# Patient Record
Sex: FEMALE | Race: WHITE | Employment: OTHER | URBAN - METROPOLITAN AREA
[De-identification: names, ages, dates, MRNs, and addresses within clinical notes are randomized per-mention and may not be internally consistent; named-entity substitution may affect disease eponyms.]

---

## 2017-02-13 RX ORDER — GABAPENTIN 600 MG/1
TABLET ORAL
Qty: 360 TABLET | Refills: 2 | Status: SHIPPED | OUTPATIENT
Start: 2017-02-13 | End: 2017-03-27 | Stop reason: SDUPTHER

## 2017-02-15 RX ORDER — CITALOPRAM 40 MG/1
TABLET ORAL
Qty: 90 TABLET | Refills: 0 | Status: SHIPPED | OUTPATIENT
Start: 2017-02-15 | End: 2019-02-20 | Stop reason: SDUPTHER

## 2017-04-05 ENCOUNTER — OFFICE VISIT (OUTPATIENT)
Dept: FAMILY MEDICINE CLINIC | Age: 67
End: 2017-04-05

## 2017-04-05 VITALS
TEMPERATURE: 98.4 F | WEIGHT: 178.4 LBS | BODY MASS INDEX: 28.67 KG/M2 | HEIGHT: 66 IN | DIASTOLIC BLOOD PRESSURE: 54 MMHG | SYSTOLIC BLOOD PRESSURE: 92 MMHG

## 2017-04-05 DIAGNOSIS — I47.1 SVT (SUPRAVENTRICULAR TACHYCARDIA) (HCC): ICD-10-CM

## 2017-04-05 DIAGNOSIS — E78.2 MIXED HYPERLIPIDEMIA: ICD-10-CM

## 2017-04-05 DIAGNOSIS — G89.29 CHRONIC BILATERAL LOW BACK PAIN WITH RIGHT-SIDED SCIATICA: ICD-10-CM

## 2017-04-05 DIAGNOSIS — G44.52 NEW DAILY PERSISTENT HEADACHE: ICD-10-CM

## 2017-04-05 DIAGNOSIS — R55 SYNCOPE AND COLLAPSE: Primary | ICD-10-CM

## 2017-04-05 DIAGNOSIS — F41.1 ANXIETY STATE: ICD-10-CM

## 2017-04-05 DIAGNOSIS — M54.41 CHRONIC BILATERAL LOW BACK PAIN WITH RIGHT-SIDED SCIATICA: ICD-10-CM

## 2017-04-05 DIAGNOSIS — R55 SYNCOPE AND COLLAPSE: ICD-10-CM

## 2017-04-05 PROBLEM — R51.9 HEADACHE, CHRONIC DAILY: Status: ACTIVE | Noted: 2017-04-05

## 2017-04-05 PROCEDURE — 93000 ELECTROCARDIOGRAM COMPLETE: CPT | Performed by: INTERNAL MEDICINE

## 2017-04-05 PROCEDURE — 99215 OFFICE O/P EST HI 40 MIN: CPT | Performed by: INTERNAL MEDICINE

## 2017-04-05 PROCEDURE — 93880 EXTRACRANIAL BILAT STUDY: CPT | Performed by: INTERNAL MEDICINE

## 2017-04-05 ASSESSMENT — ENCOUNTER SYMPTOMS
SINUS PRESSURE: 0
RHINORRHEA: 0
NAUSEA: 0
VOMITING: 0
ABDOMINAL PAIN: 0
CONSTIPATION: 0
COUGH: 0
DIARRHEA: 0
APNEA: 0

## 2017-04-06 LAB
ALT SERPL-CCNC: 14 U/L (ref 10–40)
ANION GAP SERPL CALCULATED.3IONS-SCNC: 14 MMOL/L (ref 3–16)
AST SERPL-CCNC: 12 U/L (ref 15–37)
BANDED NEUTROPHILS RELATIVE PERCENT: 8 % (ref 0–7)
BASOPHILS ABSOLUTE: 0 K/UL (ref 0–0.2)
BASOPHILS RELATIVE PERCENT: 0 %
BUN BLDV-MCNC: 26 MG/DL (ref 7–20)
CALCIUM SERPL-MCNC: 8.7 MG/DL (ref 8.3–10.6)
CHLORIDE BLD-SCNC: 99 MMOL/L (ref 99–110)
CHOLESTEROL, TOTAL: 182 MG/DL (ref 0–199)
CO2: 23 MMOL/L (ref 21–32)
CREAT SERPL-MCNC: 1.3 MG/DL (ref 0.6–1.2)
EOSINOPHILS ABSOLUTE: 0.2 K/UL (ref 0–0.6)
EOSINOPHILS RELATIVE PERCENT: 2 %
GFR AFRICAN AMERICAN: 50
GFR NON-AFRICAN AMERICAN: 41
GLUCOSE BLD-MCNC: 76 MG/DL (ref 70–99)
HCT VFR BLD CALC: 42.5 % (ref 36–48)
HDLC SERPL-MCNC: 51 MG/DL (ref 40–60)
HEMOGLOBIN: 13.8 G/DL (ref 12–16)
LDL CHOLESTEROL CALCULATED: 95 MG/DL
LYMPHOCYTES ABSOLUTE: 2.9 K/UL (ref 1–5.1)
LYMPHOCYTES RELATIVE PERCENT: 27 %
MCH RBC QN AUTO: 30.1 PG (ref 26–34)
MCHC RBC AUTO-ENTMCNC: 32.6 G/DL (ref 31–36)
MCV RBC AUTO: 92.3 FL (ref 80–100)
MONOCYTES ABSOLUTE: 1.2 K/UL (ref 0–1.3)
MONOCYTES RELATIVE PERCENT: 11 %
NEUTROPHILS ABSOLUTE: 6.4 K/UL (ref 1.7–7.7)
NEUTROPHILS RELATIVE PERCENT: 52 %
PDW BLD-RTO: 14.4 % (ref 12.4–15.4)
PLATELET # BLD: 264 K/UL (ref 135–450)
PLATELET SLIDE REVIEW: ADEQUATE
PMV BLD AUTO: 9.6 FL (ref 5–10.5)
POTASSIUM SERPL-SCNC: 4.3 MMOL/L (ref 3.5–5.1)
RBC # BLD: 4.6 M/UL (ref 4–5.2)
SODIUM BLD-SCNC: 136 MMOL/L (ref 136–145)
TRIGL SERPL-MCNC: 178 MG/DL (ref 0–150)
VLDLC SERPL CALC-MCNC: 36 MG/DL
WBC # BLD: 10.7 K/UL (ref 4–11)

## 2017-04-10 ENCOUNTER — TELEPHONE (OUTPATIENT)
Dept: PAIN MANAGEMENT | Age: 67
End: 2017-04-10

## 2017-04-12 ENCOUNTER — HOSPITAL ENCOUNTER (OUTPATIENT)
Dept: VASCULAR LAB | Age: 67
Discharge: HOME | End: 2017-04-12
Attending: INTERNAL MEDICINE | Admitting: INTERNAL MEDICINE

## 2017-04-12 DIAGNOSIS — G44.52 NEW DAILY PERSISTENT HEADACHE (NDPH): ICD-10-CM

## 2017-04-12 DIAGNOSIS — R55 SYNCOPE AND COLLAPSE: ICD-10-CM

## 2017-04-12 DIAGNOSIS — G44.52 NEW DAILY PERSISTENT HEADACHE: ICD-10-CM

## 2017-04-18 ENCOUNTER — TELEPHONE (OUTPATIENT)
Dept: FAMILY MEDICINE CLINIC | Age: 67
End: 2017-04-18

## 2017-04-18 DIAGNOSIS — R55 SYNCOPE AND COLLAPSE: Primary | ICD-10-CM

## 2017-04-28 PROCEDURE — 93228 REMOTE 30 DAY ECG REV/REPORT: CPT | Performed by: INTERNAL MEDICINE

## 2017-05-05 DIAGNOSIS — I47.1 PAROXYSMAL SUPRAVENTRICULAR TACHYCARDIA (HCC): Primary | ICD-10-CM

## 2017-05-10 ENCOUNTER — TELEPHONE (OUTPATIENT)
Dept: FAMILY MEDICINE CLINIC | Age: 67
End: 2017-05-10

## 2017-05-23 ENCOUNTER — OFFICE VISIT (OUTPATIENT)
Dept: CARDIOLOGY CLINIC | Age: 67
End: 2017-05-23

## 2017-05-23 VITALS
HEIGHT: 66 IN | WEIGHT: 176 LBS | HEART RATE: 106 BPM | BODY MASS INDEX: 28.28 KG/M2 | OXYGEN SATURATION: 98 % | SYSTOLIC BLOOD PRESSURE: 130 MMHG | DIASTOLIC BLOOD PRESSURE: 70 MMHG

## 2017-05-23 DIAGNOSIS — I47.1 SVT (SUPRAVENTRICULAR TACHYCARDIA) (HCC): ICD-10-CM

## 2017-05-23 DIAGNOSIS — R55 SYNCOPE AND COLLAPSE: Primary | ICD-10-CM

## 2017-05-23 PROCEDURE — 99205 OFFICE O/P NEW HI 60 MIN: CPT | Performed by: INTERNAL MEDICINE

## 2017-05-23 PROCEDURE — 93000 ELECTROCARDIOGRAM COMPLETE: CPT | Performed by: INTERNAL MEDICINE

## 2017-05-31 ENCOUNTER — TELEPHONE (OUTPATIENT)
Dept: CARDIOLOGY CLINIC | Age: 67
End: 2017-05-31

## 2017-06-20 ENCOUNTER — TELEPHONE (OUTPATIENT)
Dept: FAMILY MEDICINE CLINIC | Age: 67
End: 2017-06-20

## 2017-08-16 ENCOUNTER — TELEPHONE (OUTPATIENT)
Dept: PAIN MANAGEMENT | Age: 67
End: 2017-08-16

## 2017-12-13 ENCOUNTER — HOSPITAL ENCOUNTER (OUTPATIENT)
Dept: ULTRASOUND IMAGING | Age: 67
Discharge: HOME | End: 2017-12-13
Attending: INTERNAL MEDICINE | Admitting: INTERNAL MEDICINE

## 2017-12-13 DIAGNOSIS — R55 SYNCOPE AND COLLAPSE: ICD-10-CM

## 2017-12-13 DIAGNOSIS — N17.9 ACUTE RENAL FAILURE, UNSPECIFIED ACUTE RENAL FAILURE TYPE (HCC): ICD-10-CM

## 2018-07-24 ENCOUNTER — HOSPITAL ENCOUNTER (OUTPATIENT)
Dept: NON INVASIVE DIAGNOSTICS | Age: 68
Discharge: HOME | End: 2018-07-24
Attending: FAMILY MEDICINE | Admitting: FAMILY MEDICINE

## 2018-07-24 DIAGNOSIS — R20.2 PARESTHESIA: ICD-10-CM

## 2018-08-24 ENCOUNTER — HOSPITAL ENCOUNTER (OUTPATIENT)
Dept: NON INVASIVE DIAGNOSTICS | Age: 68
Discharge: HOME | End: 2018-08-24
Attending: NURSE PRACTITIONER | Admitting: NURSE PRACTITIONER

## 2018-08-24 DIAGNOSIS — M79.671 RIGHT FOOT PAIN: ICD-10-CM

## 2018-08-28 ENCOUNTER — HOSPITAL ENCOUNTER (OUTPATIENT)
Dept: NEUROLOGY | Age: 68
Discharge: HOME | End: 2018-08-28
Attending: FAMILY MEDICINE | Admitting: FAMILY MEDICINE

## 2018-08-28 DIAGNOSIS — R20.2 PARESTHESIA OF SKIN: ICD-10-CM

## 2018-08-28 NOTE — PROCEDURES
Electrodiagnostic Report  [facility] [facility], Sandra Lemos DO, Vernia Records Avila Leon MD  Phone: [phone]  Fax: [phone]    08/28/18    Sunni Love  80 y.o.  1950  [id number]  Referring Provider: Shay Lockwood MD    Clinical Problem:  81 y/o with history of left arm paresthesia from elbow distally, onset months ago.  PMH: + CKD, + neck and back pain, no neck or arm surgery PE: reflexes trace, + thumb opposition weakness, + hand intrinsic weakness LUE    EMG SUMMARY TABLE LEFT UPPER     Spontaneous     MUAP   Recruitment    Insertional Activity Fibrillation Potential Positive Sharp Waves   Fasiculation High Frequency Potentials   Amp Duration PPP Pattern   Deltoid C5.6 Ax normal none none none none normal normal normal normal   Biceps C5,6 musc cut normal none none none none normal normal normal normal   Triceps C6,7,8 Radial normal none none none none normal normal normal normal   Pronator Teres C6,7 Median normal none none none none normal normal normal normal   Brachio Rad C5,6 Radial normal none none none none normal normal normal normal   Ext Ind Prop C7,8 Radial normal none none none none normal normal normal normal   Oppones Poll C8-T1 Median normal none none none none normal normal normal normal   1st Dorsal Int C8-T1 Ulnar normal none none none none normal normal normal normal   Cerv Paraspinals C2-T1 PPR       normal none none none none normal normal normal normal     Nerve Conduction Studies     Nerve Sensory Distal Latency (msec) Sensory Distal Latency (msec) Amp uv Amp uv Motor Distal Latency (msec) Motor Distal Latency (msec) Amp kv Amp kv Motor NCV (m/sec) Motor NCV (m/sec)    Right Left Right Left Right Left Right Left Right Left   Median (n<3.6) 4.1 (n<3.6)  22  (n<4.3) 3.6 (n<4.3)  7.7  (n>50) 52 (n>50)   Ulnar (n<3.7) 3.4 (n<3.7)  15  (n<4.2) 3.4 (n<4.2)  6.2   5.6 b.e(n>50)   a.e(>45) 57 b.e(n>50)  40 a.e(>45)   Radial (n<3.5) (n<3.5)             Summary of EMG and Nerve Conduction Findings: The above EMG needle exam was within normal limits. Nerve conduction studies demonstrate mild prolongation of lef  median sensory distal latency. There is slowing of left ulnar motor conduction velocity across elbow Remainder within normal limits. Overall Impression: Left ulnar neuropathy at elbow with underlying left carpal tunnel syndrome, both mild severity. No evidence of an acute radiculopathy or other entrapment neuropathy. Thank you. Electronically signed by:  Prince Bozena DO,8/28/2018,11:31 AM

## 2019-02-20 ENCOUNTER — OFFICE VISIT (OUTPATIENT)
Dept: INTERNAL MEDICINE CLINIC | Age: 69
End: 2019-02-20
Payer: MEDICARE

## 2019-02-20 VITALS
WEIGHT: 183 LBS | HEIGHT: 64 IN | BODY MASS INDEX: 31.24 KG/M2 | DIASTOLIC BLOOD PRESSURE: 62 MMHG | TEMPERATURE: 98.1 F | RESPIRATION RATE: 16 BRPM | HEART RATE: 70 BPM | SYSTOLIC BLOOD PRESSURE: 106 MMHG | OXYGEN SATURATION: 98 %

## 2019-02-20 DIAGNOSIS — N18.30 CHRONIC RENAL FAILURE, STAGE 3 (MODERATE) (HCC): ICD-10-CM

## 2019-02-20 DIAGNOSIS — N18.30 CHRONIC KIDNEY DISEASE, STAGE III (MODERATE) (HCC): ICD-10-CM

## 2019-02-20 DIAGNOSIS — Z13.31 POSITIVE DEPRESSION SCREENING: ICD-10-CM

## 2019-02-20 DIAGNOSIS — G89.4 CHRONIC PAIN SYNDROME: ICD-10-CM

## 2019-02-20 DIAGNOSIS — M51.9 LUMBAR DISC DISEASE: ICD-10-CM

## 2019-02-20 DIAGNOSIS — Z12.39 BREAST CANCER SCREENING: ICD-10-CM

## 2019-02-20 DIAGNOSIS — J31.0 CHRONIC RHINITIS: ICD-10-CM

## 2019-02-20 DIAGNOSIS — E78.00 HYPERCHOLESTEREMIA: ICD-10-CM

## 2019-02-20 DIAGNOSIS — R53.82 CHRONIC FATIGUE: ICD-10-CM

## 2019-02-20 DIAGNOSIS — J06.9 UPPER RESPIRATORY TRACT INFECTION, UNSPECIFIED TYPE: ICD-10-CM

## 2019-02-20 DIAGNOSIS — R10.30 LOWER ABDOMINAL PAIN: Primary | ICD-10-CM

## 2019-02-20 DIAGNOSIS — Z91.81 AT HIGH RISK FOR FALLS: ICD-10-CM

## 2019-02-20 DIAGNOSIS — F33.0 MILD EPISODE OF RECURRENT MAJOR DEPRESSIVE DISORDER (HCC): ICD-10-CM

## 2019-02-20 LAB
A/G RATIO: 1.9 (ref 1.1–2.2)
ALBUMIN SERPL-MCNC: 4 G/DL (ref 3.4–5)
ALP BLD-CCNC: 107 U/L (ref 40–129)
ALT SERPL-CCNC: 12 U/L (ref 10–40)
ANION GAP SERPL CALCULATED.3IONS-SCNC: 13 MMOL/L (ref 3–16)
AST SERPL-CCNC: 15 U/L (ref 15–37)
BASOPHILS ABSOLUTE: 0 K/UL (ref 0–0.2)
BASOPHILS RELATIVE PERCENT: 0.7 %
BILIRUB SERPL-MCNC: <0.2 MG/DL (ref 0–1)
BILIRUBIN, POC: NORMAL
BLOOD URINE, POC: NORMAL
BUN BLDV-MCNC: 21 MG/DL (ref 7–20)
CALCIUM SERPL-MCNC: 9.2 MG/DL (ref 8.3–10.6)
CHLORIDE BLD-SCNC: 101 MMOL/L (ref 99–110)
CHOLESTEROL, TOTAL: 186 MG/DL (ref 0–199)
CLARITY, POC: NORMAL
CO2: 27 MMOL/L (ref 21–32)
COLOR, POC: NORMAL
CREAT SERPL-MCNC: 1.3 MG/DL (ref 0.6–1.2)
EOSINOPHILS ABSOLUTE: 0.3 K/UL (ref 0–0.6)
EOSINOPHILS RELATIVE PERCENT: 4.2 %
GFR AFRICAN AMERICAN: 49
GFR NON-AFRICAN AMERICAN: 41
GLOBULIN: 2.1 G/DL
GLUCOSE BLD-MCNC: 88 MG/DL (ref 70–99)
GLUCOSE URINE, POC: NORMAL
HCT VFR BLD CALC: 43.1 % (ref 36–48)
HDLC SERPL-MCNC: 51 MG/DL (ref 40–60)
HEMOGLOBIN: 13.8 G/DL (ref 12–16)
KETONES, POC: NORMAL
LDL CHOLESTEROL CALCULATED: 105 MG/DL
LEUKOCYTE EST, POC: NORMAL
LYMPHOCYTES ABSOLUTE: 2 K/UL (ref 1–5.1)
LYMPHOCYTES RELATIVE PERCENT: 31.3 %
MCH RBC QN AUTO: 31.3 PG (ref 26–34)
MCHC RBC AUTO-ENTMCNC: 31.9 G/DL (ref 31–36)
MCV RBC AUTO: 98.1 FL (ref 80–100)
MONOCYTES ABSOLUTE: 0.6 K/UL (ref 0–1.3)
MONOCYTES RELATIVE PERCENT: 8.6 %
NEUTROPHILS ABSOLUTE: 3.6 K/UL (ref 1.7–7.7)
NEUTROPHILS RELATIVE PERCENT: 55.2 %
NITRITE, POC: NORMAL
PDW BLD-RTO: 14.6 % (ref 12.4–15.4)
PH, POC: 6
PLATELET # BLD: 236 K/UL (ref 135–450)
PMV BLD AUTO: 8.9 FL (ref 5–10.5)
POTASSIUM SERPL-SCNC: 5.6 MMOL/L (ref 3.5–5.1)
PROTEIN, POC: NORMAL
RBC # BLD: 4.39 M/UL (ref 4–5.2)
SODIUM BLD-SCNC: 141 MMOL/L (ref 136–145)
SPECIFIC GRAVITY, POC: 1.02
TOTAL PROTEIN: 6.1 G/DL (ref 6.4–8.2)
TRIGL SERPL-MCNC: 150 MG/DL (ref 0–150)
TSH SERPL DL<=0.05 MIU/L-ACNC: 2.84 UIU/ML (ref 0.27–4.2)
UROBILINOGEN, POC: NORMAL
VLDLC SERPL CALC-MCNC: 30 MG/DL
WBC # BLD: 6.5 K/UL (ref 4–11)

## 2019-02-20 PROCEDURE — G8431 POS CLIN DEPRES SCRN F/U DOC: HCPCS | Performed by: INTERNAL MEDICINE

## 2019-02-20 PROCEDURE — 3017F COLORECTAL CA SCREEN DOC REV: CPT | Performed by: INTERNAL MEDICINE

## 2019-02-20 PROCEDURE — G8417 CALC BMI ABV UP PARAM F/U: HCPCS | Performed by: INTERNAL MEDICINE

## 2019-02-20 PROCEDURE — G8484 FLU IMMUNIZE NO ADMIN: HCPCS | Performed by: INTERNAL MEDICINE

## 2019-02-20 PROCEDURE — 36415 COLL VENOUS BLD VENIPUNCTURE: CPT | Performed by: INTERNAL MEDICINE

## 2019-02-20 PROCEDURE — G8427 DOCREV CUR MEDS BY ELIG CLIN: HCPCS | Performed by: INTERNAL MEDICINE

## 2019-02-20 PROCEDURE — G8400 PT W/DXA NO RESULTS DOC: HCPCS | Performed by: INTERNAL MEDICINE

## 2019-02-20 PROCEDURE — 4040F PNEUMOC VAC/ADMIN/RCVD: CPT | Performed by: INTERNAL MEDICINE

## 2019-02-20 PROCEDURE — 1101F PT FALLS ASSESS-DOCD LE1/YR: CPT | Performed by: INTERNAL MEDICINE

## 2019-02-20 PROCEDURE — 99204 OFFICE O/P NEW MOD 45 MIN: CPT | Performed by: INTERNAL MEDICINE

## 2019-02-20 PROCEDURE — 1123F ACP DISCUSS/DSCN MKR DOCD: CPT | Performed by: INTERNAL MEDICINE

## 2019-02-20 PROCEDURE — 1090F PRES/ABSN URINE INCON ASSESS: CPT | Performed by: INTERNAL MEDICINE

## 2019-02-20 PROCEDURE — 81002 URINALYSIS NONAUTO W/O SCOPE: CPT | Performed by: INTERNAL MEDICINE

## 2019-02-20 PROCEDURE — 4004F PT TOBACCO SCREEN RCVD TLK: CPT | Performed by: INTERNAL MEDICINE

## 2019-02-20 RX ORDER — TRAMADOL HYDROCHLORIDE 50 MG/1
50 TABLET ORAL EVERY 12 HOURS PRN
Qty: 30 TABLET | Refills: 0 | Status: SHIPPED | OUTPATIENT
Start: 2019-02-20 | End: 2019-03-07

## 2019-02-20 RX ORDER — FLUTICASONE PROPIONATE 50 MCG
1 SPRAY, SUSPENSION (ML) NASAL DAILY
Qty: 1 BOTTLE | Refills: 0 | Status: SHIPPED | OUTPATIENT
Start: 2019-02-20 | End: 2020-09-01 | Stop reason: SDUPTHER

## 2019-02-20 RX ORDER — CIPROFLOXACIN 500 MG/1
500 TABLET, FILM COATED ORAL 2 TIMES DAILY
Qty: 20 TABLET | Refills: 0 | Status: SHIPPED | OUTPATIENT
Start: 2019-02-20 | End: 2019-02-21 | Stop reason: SINTOL

## 2019-02-20 RX ORDER — CITALOPRAM 40 MG/1
TABLET ORAL
Qty: 90 TABLET | Refills: 1 | Status: SHIPPED | OUTPATIENT
Start: 2019-02-20 | End: 2019-12-18

## 2019-02-20 RX ORDER — TRAMADOL HYDROCHLORIDE 50 MG/1
50 TABLET ORAL EVERY 12 HOURS PRN
COMMUNITY
End: 2019-02-20 | Stop reason: SDUPTHER

## 2019-02-20 ASSESSMENT — ENCOUNTER SYMPTOMS
VOMITING: 0
CONSTIPATION: 0
BLOOD IN STOOL: 0
SINUS PAIN: 1
ABDOMINAL PAIN: 1
COUGH: 1
SINUS PRESSURE: 1
WHEEZING: 0
SORE THROAT: 0
RHINORRHEA: 1
BACK PAIN: 1
NAUSEA: 1
SHORTNESS OF BREATH: 1
DIARRHEA: 1

## 2019-02-20 ASSESSMENT — PATIENT HEALTH QUESTIONNAIRE - PHQ9
9. THOUGHTS THAT YOU WOULD BE BETTER OFF DEAD, OR OF HURTING YOURSELF: 0
8. MOVING OR SPEAKING SO SLOWLY THAT OTHER PEOPLE COULD HAVE NOTICED. OR THE OPPOSITE, BEING SO FIGETY OR RESTLESS THAT YOU HAVE BEEN MOVING AROUND A LOT MORE THAN USUAL: 1
3. TROUBLE FALLING OR STAYING ASLEEP: 3
SUM OF ALL RESPONSES TO PHQ QUESTIONS 1-9: 19
5. POOR APPETITE OR OVEREATING: 3
4. FEELING TIRED OR HAVING LITTLE ENERGY: 3
SUM OF ALL RESPONSES TO PHQ9 QUESTIONS 1 & 2: 6
6. FEELING BAD ABOUT YOURSELF - OR THAT YOU ARE A FAILURE OR HAVE LET YOURSELF OR YOUR FAMILY DOWN: 0
1. LITTLE INTEREST OR PLEASURE IN DOING THINGS: 3
SUM OF ALL RESPONSES TO PHQ QUESTIONS 1-9: 19
10. IF YOU CHECKED OFF ANY PROBLEMS, HOW DIFFICULT HAVE THESE PROBLEMS MADE IT FOR YOU TO DO YOUR WORK, TAKE CARE OF THINGS AT HOME, OR GET ALONG WITH OTHER PEOPLE: 3
2. FEELING DOWN, DEPRESSED OR HOPELESS: 3
7. TROUBLE CONCENTRATING ON THINGS, SUCH AS READING THE NEWSPAPER OR WATCHING TELEVISION: 3

## 2019-02-21 RX ORDER — SULFAMETHOXAZOLE AND TRIMETHOPRIM 800; 160 MG/1; MG/1
1 TABLET ORAL 2 TIMES DAILY
Qty: 14 TABLET | Refills: 0 | Status: SHIPPED | OUTPATIENT
Start: 2019-02-21 | End: 2019-02-28

## 2019-02-24 PROBLEM — N18.30 CHRONIC RENAL FAILURE, STAGE 3 (MODERATE) (HCC): Status: ACTIVE | Noted: 2019-02-24

## 2019-02-24 PROBLEM — G89.4 CHRONIC PAIN SYNDROME: Status: ACTIVE | Noted: 2019-02-24

## 2019-02-24 PROBLEM — F33.0 MILD EPISODE OF RECURRENT MAJOR DEPRESSIVE DISORDER (HCC): Status: ACTIVE | Noted: 2019-02-24

## 2019-03-06 ENCOUNTER — HOSPITAL ENCOUNTER (OUTPATIENT)
Dept: MRI IMAGING | Age: 69
Discharge: HOME | End: 2019-03-06
Payer: MEDICARE

## 2019-03-06 DIAGNOSIS — M51.9 LUMBAR DISC DISEASE: ICD-10-CM

## 2019-03-06 PROCEDURE — 72148 MRI LUMBAR SPINE W/O DYE: CPT

## 2019-10-17 RX ORDER — LOVASTATIN 40 MG/1
TABLET ORAL
Qty: 90 TABLET | Refills: 1 | Status: SHIPPED | OUTPATIENT
Start: 2019-10-17 | End: 2020-04-20

## 2019-11-08 RX ORDER — GABAPENTIN 600 MG/1
600 TABLET ORAL 4 TIMES DAILY
Qty: 360 TABLET | Refills: 0 | Status: SHIPPED | OUTPATIENT
Start: 2019-11-08 | End: 2020-02-06

## 2019-12-05 ENCOUNTER — OFFICE VISIT (OUTPATIENT)
Dept: INTERNAL MEDICINE CLINIC | Age: 69
End: 2019-12-05
Payer: MEDICARE

## 2019-12-05 VITALS
RESPIRATION RATE: 16 BRPM | BODY MASS INDEX: 31.64 KG/M2 | WEIGHT: 185.8 LBS | OXYGEN SATURATION: 97 % | HEART RATE: 71 BPM | DIASTOLIC BLOOD PRESSURE: 64 MMHG | SYSTOLIC BLOOD PRESSURE: 104 MMHG | TEMPERATURE: 98 F

## 2019-12-05 DIAGNOSIS — H53.2 DOUBLE VISION: ICD-10-CM

## 2019-12-05 DIAGNOSIS — G62.9 NEUROPATHY: ICD-10-CM

## 2019-12-05 DIAGNOSIS — M50.90 CERVICAL DISC DISEASE: ICD-10-CM

## 2019-12-05 DIAGNOSIS — R29.898 WEAKNESS OF BOTH LOWER EXTREMITIES: ICD-10-CM

## 2019-12-05 DIAGNOSIS — R53.82 CHRONIC FATIGUE: ICD-10-CM

## 2019-12-05 DIAGNOSIS — R27.0 ATAXIA: ICD-10-CM

## 2019-12-05 DIAGNOSIS — R51.9 HEADACHE, CHRONIC DAILY: ICD-10-CM

## 2019-12-05 DIAGNOSIS — F33.1 MODERATE EPISODE OF RECURRENT MAJOR DEPRESSIVE DISORDER (HCC): ICD-10-CM

## 2019-12-05 DIAGNOSIS — M51.9 LUMBAR DISC DISEASE: ICD-10-CM

## 2019-12-05 DIAGNOSIS — G89.4 CHRONIC PAIN SYNDROME: Primary | ICD-10-CM

## 2019-12-05 DIAGNOSIS — R19.7 DIARRHEA, UNSPECIFIED TYPE: ICD-10-CM

## 2019-12-05 DIAGNOSIS — N18.30 CHRONIC RENAL FAILURE, STAGE 3 (MODERATE) (HCC): ICD-10-CM

## 2019-12-05 DIAGNOSIS — R73.9 HYPERGLYCEMIA: ICD-10-CM

## 2019-12-05 LAB
A/G RATIO: 2 (ref 1.1–2.2)
ALBUMIN SERPL-MCNC: 4.1 G/DL (ref 3.4–5)
ALP BLD-CCNC: 97 U/L (ref 40–129)
ALT SERPL-CCNC: 9 U/L (ref 10–40)
ANION GAP SERPL CALCULATED.3IONS-SCNC: 14 MMOL/L (ref 3–16)
AST SERPL-CCNC: 15 U/L (ref 15–37)
BILIRUB SERPL-MCNC: <0.2 MG/DL (ref 0–1)
BUN BLDV-MCNC: 20 MG/DL (ref 7–20)
CALCIUM SERPL-MCNC: 9 MG/DL (ref 8.3–10.6)
CHLORIDE BLD-SCNC: 101 MMOL/L (ref 99–110)
CO2: 25 MMOL/L (ref 21–32)
CREAT SERPL-MCNC: 1.3 MG/DL (ref 0.6–1.2)
GFR AFRICAN AMERICAN: 49
GFR NON-AFRICAN AMERICAN: 41
GLOBULIN: 2.1 G/DL
GLUCOSE BLD-MCNC: 93 MG/DL (ref 70–99)
POTASSIUM SERPL-SCNC: 4.8 MMOL/L (ref 3.5–5.1)
SODIUM BLD-SCNC: 140 MMOL/L (ref 136–145)
TOTAL PROTEIN: 6.2 G/DL (ref 6.4–8.2)
TSH SERPL DL<=0.05 MIU/L-ACNC: 4.89 UIU/ML (ref 0.27–4.2)

## 2019-12-05 PROCEDURE — 1123F ACP DISCUSS/DSCN MKR DOCD: CPT | Performed by: INTERNAL MEDICINE

## 2019-12-05 PROCEDURE — 99214 OFFICE O/P EST MOD 30 MIN: CPT | Performed by: INTERNAL MEDICINE

## 2019-12-05 PROCEDURE — G8484 FLU IMMUNIZE NO ADMIN: HCPCS | Performed by: INTERNAL MEDICINE

## 2019-12-05 PROCEDURE — 3017F COLORECTAL CA SCREEN DOC REV: CPT | Performed by: INTERNAL MEDICINE

## 2019-12-05 PROCEDURE — 36415 COLL VENOUS BLD VENIPUNCTURE: CPT | Performed by: INTERNAL MEDICINE

## 2019-12-05 PROCEDURE — 4004F PT TOBACCO SCREEN RCVD TLK: CPT | Performed by: INTERNAL MEDICINE

## 2019-12-05 PROCEDURE — G8400 PT W/DXA NO RESULTS DOC: HCPCS | Performed by: INTERNAL MEDICINE

## 2019-12-05 PROCEDURE — 4040F PNEUMOC VAC/ADMIN/RCVD: CPT | Performed by: INTERNAL MEDICINE

## 2019-12-05 PROCEDURE — G8427 DOCREV CUR MEDS BY ELIG CLIN: HCPCS | Performed by: INTERNAL MEDICINE

## 2019-12-05 PROCEDURE — G8417 CALC BMI ABV UP PARAM F/U: HCPCS | Performed by: INTERNAL MEDICINE

## 2019-12-05 PROCEDURE — 1090F PRES/ABSN URINE INCON ASSESS: CPT | Performed by: INTERNAL MEDICINE

## 2019-12-05 RX ORDER — PROMETHAZINE HYDROCHLORIDE 12.5 MG/1
12.5 TABLET ORAL EVERY 8 HOURS PRN
Qty: 90 TABLET | Refills: 0 | Status: SHIPPED | OUTPATIENT
Start: 2019-12-05 | End: 2021-10-28 | Stop reason: SDUPTHER

## 2019-12-05 RX ORDER — CYCLOBENZAPRINE HCL 10 MG
TABLET ORAL
Qty: 90 TABLET | Refills: 2 | Status: SHIPPED | OUTPATIENT
Start: 2019-12-05 | End: 2020-04-13

## 2019-12-05 RX ORDER — CALCIUM POLYCARBOPHIL 625 MG
625 TABLET ORAL DAILY
Qty: 30 TABLET | Refills: 2 | Status: ON HOLD | OUTPATIENT
Start: 2019-12-05 | End: 2020-08-23

## 2019-12-05 RX ORDER — DICYCLOMINE HYDROCHLORIDE 10 MG/1
10 CAPSULE ORAL 4 TIMES DAILY PRN
Qty: 120 CAPSULE | Refills: 1 | Status: SHIPPED | OUTPATIENT
Start: 2019-12-05

## 2019-12-05 RX ORDER — TRAMADOL HYDROCHLORIDE 50 MG/1
50 TABLET ORAL EVERY 8 HOURS PRN
Qty: 30 TABLET | Refills: 0 | Status: SHIPPED | OUTPATIENT
Start: 2019-12-05 | End: 2020-01-20

## 2019-12-05 RX ORDER — TRAMADOL HYDROCHLORIDE 50 MG/1
50 TABLET ORAL EVERY 12 HOURS
COMMUNITY
End: 2019-12-05 | Stop reason: SDUPTHER

## 2019-12-06 LAB
ESTIMATED AVERAGE GLUCOSE: 114 MG/DL
HBA1C MFR BLD: 5.6 %

## 2019-12-08 RX ORDER — LEVOTHYROXINE SODIUM 0.03 MG/1
25 TABLET ORAL DAILY
Qty: 30 TABLET | Refills: 2 | Status: SHIPPED | OUTPATIENT
Start: 2019-12-08 | End: 2019-12-08 | Stop reason: SDUPTHER

## 2019-12-09 RX ORDER — LEVOTHYROXINE SODIUM 0.03 MG/1
25 TABLET ORAL DAILY
Qty: 90 TABLET | Refills: 1 | Status: SHIPPED | OUTPATIENT
Start: 2019-12-09 | End: 2020-03-02

## 2019-12-18 DIAGNOSIS — F33.0 MILD EPISODE OF RECURRENT MAJOR DEPRESSIVE DISORDER (HCC): ICD-10-CM

## 2019-12-18 RX ORDER — CITALOPRAM 40 MG/1
TABLET ORAL
Qty: 90 TABLET | Refills: 1 | Status: SHIPPED | OUTPATIENT
Start: 2019-12-18 | End: 2019-12-31 | Stop reason: SDUPTHER

## 2019-12-20 ENCOUNTER — HOSPITAL ENCOUNTER (OUTPATIENT)
Dept: MRI IMAGING | Age: 69
Discharge: HOME | End: 2019-12-20
Payer: MEDICARE

## 2019-12-20 DIAGNOSIS — R29.898 WEAKNESS OF BOTH LOWER EXTREMITIES: ICD-10-CM

## 2019-12-20 DIAGNOSIS — R27.0 ATAXIA: ICD-10-CM

## 2019-12-20 DIAGNOSIS — M50.90 CERVICAL DISC DISEASE: ICD-10-CM

## 2019-12-20 DIAGNOSIS — H53.2 DOUBLE VISION: ICD-10-CM

## 2019-12-20 DIAGNOSIS — R51.9 HEADACHE, CHRONIC DAILY: ICD-10-CM

## 2019-12-20 PROCEDURE — 6360000004 HC RX CONTRAST MEDICATION: Performed by: INTERNAL MEDICINE

## 2019-12-20 PROCEDURE — 70553 MRI BRAIN STEM W/O & W/DYE: CPT

## 2019-12-20 PROCEDURE — 72141 MRI NECK SPINE W/O DYE: CPT

## 2019-12-20 PROCEDURE — A9577 INJ MULTIHANCE: HCPCS | Performed by: INTERNAL MEDICINE

## 2019-12-20 RX ADMIN — GADOBENATE DIMEGLUMINE 15 ML: 529 INJECTION, SOLUTION INTRAVENOUS at 16:52

## 2019-12-23 DIAGNOSIS — E04.1 THYROID NODULE: Primary | ICD-10-CM

## 2019-12-25 ASSESSMENT — ENCOUNTER SYMPTOMS
DIARRHEA: 1
VOMITING: 0
NAUSEA: 1
SHORTNESS OF BREATH: 0
SINUS PRESSURE: 0
ABDOMINAL PAIN: 0
COUGH: 0

## 2019-12-31 ENCOUNTER — TELEPHONE (OUTPATIENT)
Dept: INTERNAL MEDICINE CLINIC | Age: 69
End: 2019-12-31

## 2019-12-31 RX ORDER — CITALOPRAM 40 MG/1
TABLET ORAL
Qty: 90 TABLET | Refills: 1 | Status: SHIPPED | OUTPATIENT
Start: 2019-12-31 | End: 2020-06-17

## 2020-01-20 RX ORDER — TRAMADOL HYDROCHLORIDE 50 MG/1
TABLET ORAL
Qty: 30 TABLET | Refills: 0 | Status: SHIPPED | OUTPATIENT
Start: 2020-01-20 | End: 2020-02-17

## 2020-02-06 RX ORDER — GABAPENTIN 600 MG/1
TABLET ORAL
Qty: 360 TABLET | Refills: 0 | Status: SHIPPED | OUTPATIENT
Start: 2020-02-06 | End: 2020-08-10

## 2020-02-17 RX ORDER — TRAMADOL HYDROCHLORIDE 50 MG/1
50 TABLET ORAL EVERY 8 HOURS PRN
Qty: 30 TABLET | Refills: 0 | Status: SHIPPED | OUTPATIENT
Start: 2020-02-17 | End: 2020-04-01

## 2020-03-02 RX ORDER — LEVOTHYROXINE SODIUM 0.03 MG/1
25 TABLET ORAL DAILY
Qty: 90 TABLET | Refills: 1 | Status: SHIPPED | OUTPATIENT
Start: 2020-03-02 | End: 2020-08-10

## 2020-04-01 RX ORDER — TRAMADOL HYDROCHLORIDE 50 MG/1
50 TABLET ORAL EVERY 8 HOURS PRN
Qty: 30 TABLET | Refills: 0 | Status: SHIPPED | OUTPATIENT
Start: 2020-04-01 | End: 2020-04-11

## 2020-04-13 ENCOUNTER — VIRTUAL VISIT (OUTPATIENT)
Dept: FAMILY MEDICINE CLINIC | Age: 70
End: 2020-04-13
Payer: MEDICARE

## 2020-04-13 PROCEDURE — 99213 OFFICE O/P EST LOW 20 MIN: CPT | Performed by: INTERNAL MEDICINE

## 2020-04-13 PROCEDURE — 4040F PNEUMOC VAC/ADMIN/RCVD: CPT | Performed by: INTERNAL MEDICINE

## 2020-04-13 PROCEDURE — 1090F PRES/ABSN URINE INCON ASSESS: CPT | Performed by: INTERNAL MEDICINE

## 2020-04-13 PROCEDURE — G8417 CALC BMI ABV UP PARAM F/U: HCPCS | Performed by: INTERNAL MEDICINE

## 2020-04-13 PROCEDURE — G8400 PT W/DXA NO RESULTS DOC: HCPCS | Performed by: INTERNAL MEDICINE

## 2020-04-13 PROCEDURE — 4004F PT TOBACCO SCREEN RCVD TLK: CPT | Performed by: INTERNAL MEDICINE

## 2020-04-13 PROCEDURE — 1123F ACP DISCUSS/DSCN MKR DOCD: CPT | Performed by: INTERNAL MEDICINE

## 2020-04-13 PROCEDURE — G8427 DOCREV CUR MEDS BY ELIG CLIN: HCPCS | Performed by: INTERNAL MEDICINE

## 2020-04-13 PROCEDURE — 3017F COLORECTAL CA SCREEN DOC REV: CPT | Performed by: INTERNAL MEDICINE

## 2020-04-13 RX ORDER — TRAMADOL HYDROCHLORIDE 50 MG/1
50 TABLET ORAL 2 TIMES DAILY PRN
Qty: 60 TABLET | Refills: 0 | Status: SHIPPED | OUTPATIENT
Start: 2020-04-13 | End: 2020-05-19

## 2020-04-13 ASSESSMENT — ENCOUNTER SYMPTOMS
VOMITING: 0
DIARRHEA: 0
BACK PAIN: 1
CONSTIPATION: 1
NAUSEA: 0
ABDOMINAL PAIN: 0
CHOKING: 0

## 2020-04-13 NOTE — PROGRESS NOTES
SUBJECTIVE:  Annamarie Peabody is a 72 y.o. female being evaluated for:    Chief Complaint   Patient presents with    3 Month Follow-Up     chronic pain-tramadol refill       HPI Patient with chronic pain  Hurting most between her shoulder and across low back  Worse on the left side  Pian down both the left leg  Uses tramadol and it does help  Using it three times a day  Headaches are about the same Years ago had epidureal injections and helped only for a couple of days Steroids have made her jittery and nervous  Unable to use NSAIDS due to her kidney disease       Constipation and Murelax only helping so so  Happening since taking thyroid medication and cyclobensaprine     Depression some days better than others does not feel trintellex is helping   Allergies   Allergen Reactions    Nsaids      Kidney disease    Abilify [Aripiprazole] Itching    Benadryl [Diphenhydramine] Other (See Comments)    Darvon [Propoxyphene]     Dilaudid [Hydromorphone Hcl] Itching    Hydrocodone-Acetaminophen Nausea And Vomiting    Percocet [Oxycodone-Acetaminophen] Other (See Comments)     Makes her loopy    Trintellix [Vortioxetine] Other (See Comments)     Hands/feet jittery, nausea     Current Outpatient Medications   Medication Sig Dispense Refill    traMADol (ULTRAM) 50 MG tablet Take 1 tablet by mouth 2 times daily as needed for Pain for up to 30 days.  60 tablet 0    levothyroxine (SYNTHROID) 25 MCG tablet TAKE 1 TABLET BY MOUTH DAILY 90 tablet 1    gabapentin (NEURONTIN) 600 MG tablet TAKE 1 TABLET BY MOUTH FOUR TIMES DAILY 360 tablet 0    citalopram (CELEXA) 40 MG tablet One tablet daily 90 tablet 1    Calcium Polycarbophil (FIBER) 625 MG TABS Take 1 tablet by mouth daily 30 tablet 2    dicyclomine (BENTYL) 10 MG capsule Take 1 capsule by mouth 4 times daily as needed (abdominal cramping and diarrhea) 120 capsule 1    promethazine (PHENERGAN) 12.5 MG tablet Take 1 tablet by mouth every 8 hours as needed for Nausea Physically abused: Not on file     Forced sexual activity: Not on file   Other Topics Concern    Not on file   Social History Narrative    Not on file      Past Medical History:   Diagnosis Date    [facility]) [facility] [address]     CRF (chronic renal failure), stage 3 (moderate) ([address]     Depression     Diverticulosis of colon     Fibromyalgia     Fibromyalgia     Hyperlipidemia     Joint ache     Kidney failure     states begining stage 4 since summer '17    Lumbar disc disease     Lumbar disc disease     Migraine     Muscle spasm of back     Pleural effusion     SVT (supraventricular tachycardia) ([address]     Varicose vein      Past Surgical History:   Procedure Laterality Date    HERNIA REPAIR      right inguinal and hernia     HYSTERECTOMY      HYSTERECTOMY, VAGINAL      VARICOSE VEIN SURGERY         Review of Systems   Constitutional: Negative for fatigue and fever. Respiratory: Negative for choking. Cardiovascular: Negative for chest pain, palpitations and leg swelling. Gastrointestinal: Positive for constipation. Negative for abdominal pain, diarrhea, nausea and vomiting. Endocrine: Positive for cold intolerance. Negative for heat intolerance. Genitourinary: Negative for dysuria. Musculoskeletal: Positive for arthralgias (knees and hips with walking a lot ), back pain, gait problem and neck pain. Neurological: Positive for headaches. Negative for dizziness and light-headedness. OBJECTIVE    There is no height or weight on file to calculate BMI. no vitals available     Physical Exam comfortable sitting still on the video screen     ASSESSMENT/PLAN:    Rufina Burton was seen today for 3 month follow-up. Diagnoses and all orders for this visit:    Chronic pain syndrome  -     [facility] - Yanci Ma MD, Spine Surgery DEPARTMENT OF [facility]), [facility]  -     traMADol (ULTRAM) 50 MG tablet;  Take 1 tablet by mouth 2 times daily as needed for Pain for up to 30 days.    Lumbar disc disease  -     [facility] - Elke Cohn MD, Spine Surgery DEPARTMENT OF [facility]), [facility]  -     traMADol (ULTRAM) 50 MG tablet; Take 1 tablet by mouth 2 times daily as needed for Pain for up to 30 days. Arthritis of neck  -     JOEL Cohn MD, Spine Surgery DEPARTMENT [facility]), [facility]  -     traMADol (ULTRAM) 50 MG tablet; Take 1 tablet by mouth 2 times daily as needed for Pain for up to 30 days. Acquired hypothyroidism  -     TSH without Reflex; Future    Thyroid nodule  -     TSH without Reflex; Future        Orders Placed This Encounter   Medications    traMADol (ULTRAM) 50 MG tablet     Sig: Take 1 tablet by mouth 2 times daily as needed for Pain for up to 30 days. Dispense:  60 tablet     Refill:  0     Reduce doses taken as pain becomes manageable        Return in about 3 months (around 7/13/2020), or if symptoms worsen or fail to improve. There are no Patient Instructions on file for this visit.

## 2020-04-20 RX ORDER — LOVASTATIN 40 MG/1
TABLET ORAL
Qty: 90 TABLET | Refills: 1 | Status: SHIPPED | OUTPATIENT
Start: 2020-04-20 | End: 2020-10-23

## 2020-05-19 RX ORDER — TRAMADOL HYDROCHLORIDE 50 MG/1
50 TABLET ORAL 2 TIMES DAILY PRN
Qty: 60 TABLET | Refills: 0 | Status: SHIPPED | OUTPATIENT
Start: 2020-05-19 | End: 2020-06-23

## 2020-06-12 ENCOUNTER — APPOINTMENT (OUTPATIENT)
Dept: INTERVENTIONAL RADIOLOGY/VASCULAR | Age: 70
End: 2020-06-12
Attending: ANESTHESIOLOGY
Payer: MEDICARE

## 2020-06-12 ENCOUNTER — HOSPITAL ENCOUNTER (OUTPATIENT)
Age: 70
Setting detail: OUTPATIENT SURGERY
Discharge: HOME | End: 2020-06-12
Attending: ANESTHESIOLOGY | Admitting: ANESTHESIOLOGY
Payer: MEDICARE

## 2020-06-12 VITALS
OXYGEN SATURATION: 98 % | TEMPERATURE: 97.9 F | HEART RATE: 57 BPM | RESPIRATION RATE: 14 BRPM | HEIGHT: 66 IN | DIASTOLIC BLOOD PRESSURE: 53 MMHG | WEIGHT: 180 LBS | BODY MASS INDEX: 28.93 KG/M2 | SYSTOLIC BLOOD PRESSURE: 110 MMHG

## 2020-06-12 PROCEDURE — 6360000004 HC RX CONTRAST MEDICATION: Performed by: ANESTHESIOLOGY

## 2020-06-12 PROCEDURE — 3610000054 HC PAIN LEVEL 3 BASE (NON-OR): Performed by: ANESTHESIOLOGY

## 2020-06-12 PROCEDURE — 2709999900 HC NON-CHARGEABLE SUPPLY: Performed by: ANESTHESIOLOGY

## 2020-06-12 PROCEDURE — 6360000002 HC RX W HCPCS: Performed by: ANESTHESIOLOGY

## 2020-06-12 RX ORDER — METHYLPREDNISOLONE ACETATE 80 MG/ML
INJECTION, SUSPENSION INTRA-ARTICULAR; INTRALESIONAL; INTRAMUSCULAR; SOFT TISSUE
Status: COMPLETED | OUTPATIENT
Start: 2020-06-12 | End: 2020-06-12

## 2020-06-12 ASSESSMENT — PAIN DESCRIPTION - DESCRIPTORS: DESCRIPTORS: CONSTANT;JABBING

## 2020-06-12 ASSESSMENT — PAIN SCALES - GENERAL
PAINLEVEL_OUTOF10: 0
PAINLEVEL_OUTOF10: 0

## 2020-06-12 ASSESSMENT — PAIN - FUNCTIONAL ASSESSMENT
PAIN_FUNCTIONAL_ASSESSMENT: 0-10
PAIN_FUNCTIONAL_ASSESSMENT: PREVENTS OR INTERFERES WITH MANY ACTIVE NOT PASSIVE ACTIVITIES
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
## 2020-06-12 NOTE — OP NOTE
difficulty, paresthesia or occurrence of pain was encountered. Careful aspiration was negative for CSF and blood. A total of 1 cc Isovue 300 was injected yielding an epidurogram.    FLUOROSCOPY:  A fluoroscopy unit was utilized to obtain fluoroscopic images for intra-procedural use and assistance. Fluoroscopy was utilized to identify anatomic and radiographic landmarks for the accompanying procedure guidance and not for diagnostic purposes. After negative aspiration 4 cc of therapeutic injectate containing 1 cc of Depo-Medrol 80 mg/cc and 3 ml of lidocaine 1% was injected slowly in aliquots while clinically observing and monitoring the patient with negative aspiration demonstrated between aliquots of injections. At this time no paresthesia or occurrence of pain was present. The needle was then removed, the area was cleansed and a Band-Aid was placed over the injection site. There were no complications. The patient tolerated the procedure well. The procedure was performed using local anesthesia. The patient was transferred by wheelchair with accompaniment to the  Recovery Area and was monitored per protocol. The vital signs remained stable. There were no sensory or motor blockades in the lower extremities and the patient was discharged in stable condition accompanied by an escort with written instructions after fulfilling the standard discharge criteria.   Written follow up instructions were given to the patient and are as follows:    Estimated Blood Loss: 0ml    Plan:  Follow up in 4-6 weeks      Office: [phone]

## 2020-06-17 RX ORDER — CITALOPRAM 40 MG/1
TABLET ORAL
Qty: 90 TABLET | Refills: 1 | Status: SHIPPED | OUTPATIENT
Start: 2020-06-17 | End: 2020-11-10 | Stop reason: SDUPTHER

## 2020-06-23 RX ORDER — TRAMADOL HYDROCHLORIDE 50 MG/1
50 TABLET ORAL 2 TIMES DAILY PRN
Qty: 60 TABLET | Refills: 2 | Status: SHIPPED | OUTPATIENT
Start: 2020-06-23 | End: 2020-09-28 | Stop reason: SDUPTHER

## 2020-08-10 RX ORDER — GABAPENTIN 600 MG/1
600 TABLET ORAL 4 TIMES DAILY
Qty: 360 TABLET | Refills: 0 | Status: SHIPPED | OUTPATIENT
Start: 2020-08-10 | End: 2020-11-09 | Stop reason: SDUPTHER

## 2020-08-10 RX ORDER — LEVOTHYROXINE SODIUM 0.03 MG/1
25 TABLET ORAL DAILY
Qty: 90 TABLET | Refills: 1 | Status: SHIPPED | OUTPATIENT
Start: 2020-08-10 | End: 2021-01-21

## 2020-08-21 ENCOUNTER — HOSPITAL ENCOUNTER (EMERGENCY)
Age: 70
Discharge: HOME | DRG: 392 | End: 2020-08-21
Payer: MEDICARE

## 2020-08-21 ENCOUNTER — APPOINTMENT (OUTPATIENT)
Dept: CT IMAGING | Age: 70
DRG: 392 | End: 2020-08-21
Payer: MEDICARE

## 2020-08-21 VITALS
HEART RATE: 70 BPM | RESPIRATION RATE: 16 BRPM | TEMPERATURE: 98.1 F | DIASTOLIC BLOOD PRESSURE: 57 MMHG | OXYGEN SATURATION: 100 % | WEIGHT: 175.93 LBS | SYSTOLIC BLOOD PRESSURE: 120 MMHG | BODY MASS INDEX: 28.4 KG/M2

## 2020-08-21 LAB
A/G RATIO: 1.5 (ref 1.1–2.2)
ALBUMIN SERPL-MCNC: 3.7 G/DL (ref 3.4–5)
ALP BLD-CCNC: 86 U/L (ref 40–129)
ALT SERPL-CCNC: 7 U/L (ref 10–40)
ANION GAP SERPL CALCULATED.3IONS-SCNC: 8 MMOL/L (ref 3–16)
AST SERPL-CCNC: 12 U/L (ref 15–37)
BASOPHILS ABSOLUTE: 0.1 K/UL (ref 0–0.2)
BASOPHILS RELATIVE PERCENT: 0.8 %
BILIRUB SERPL-MCNC: <0.2 MG/DL (ref 0–1)
BILIRUBIN URINE: NEGATIVE
BLOOD, URINE: NEGATIVE
BUN BLDV-MCNC: 20 MG/DL (ref 7–20)
CALCIUM SERPL-MCNC: 8.6 MG/DL (ref 8.3–10.6)
CHLORIDE BLD-SCNC: 102 MMOL/L (ref 99–110)
CLARITY: ABNORMAL
CO2: 26 MMOL/L (ref 21–32)
COLOR: YELLOW
CREAT SERPL-MCNC: 1.1 MG/DL (ref 0.6–1.2)
EOSINOPHILS ABSOLUTE: 0 K/UL (ref 0–0.6)
EOSINOPHILS RELATIVE PERCENT: 0.4 %
EPITHELIAL CELLS, UA: 8 /HPF (ref 0–5)
GFR AFRICAN AMERICAN: 59
GFR NON-AFRICAN AMERICAN: 49
GLOBULIN: 2.5 G/DL
GLUCOSE BLD-MCNC: 118 MG/DL (ref 70–99)
GLUCOSE URINE: NEGATIVE MG/DL
HCT VFR BLD CALC: 41.5 % (ref 36–48)
HEMOGLOBIN: 13.6 G/DL (ref 12–16)
KETONES, URINE: NEGATIVE MG/DL
LEUKOCYTE ESTERASE, URINE: NEGATIVE
LYMPHOCYTES ABSOLUTE: 0.9 K/UL (ref 1–5.1)
LYMPHOCYTES RELATIVE PERCENT: 10.1 %
MCH RBC QN AUTO: 31.4 PG (ref 26–34)
MCHC RBC AUTO-ENTMCNC: 32.7 G/DL (ref 31–36)
MCV RBC AUTO: 96 FL (ref 80–100)
MICROSCOPIC EXAMINATION: YES
MONOCYTES ABSOLUTE: 0.4 K/UL (ref 0–1.3)
MONOCYTES RELATIVE PERCENT: 4.4 %
NEUTROPHILS ABSOLUTE: 7.9 K/UL (ref 1.7–7.7)
NEUTROPHILS RELATIVE PERCENT: 84.3 %
NITRITE, URINE: NEGATIVE
PDW BLD-RTO: 14.3 % (ref 12.4–15.4)
PH UA: 5.5 (ref 5–8)
PLATELET # BLD: 194 K/UL (ref 135–450)
PMV BLD AUTO: 8.9 FL (ref 5–10.5)
POTASSIUM SERPL-SCNC: 4.6 MMOL/L (ref 3.5–5.1)
PROTEIN UA: NEGATIVE MG/DL
RBC # BLD: 4.32 M/UL (ref 4–5.2)
RBC UA: 1 /HPF (ref 0–4)
SODIUM BLD-SCNC: 136 MMOL/L (ref 136–145)
SPECIFIC GRAVITY UA: 1.03 (ref 1–1.03)
TOTAL PROTEIN: 6.2 G/DL (ref 6.4–8.2)
URINE REFLEX TO CULTURE: YES
URINE TYPE: ABNORMAL
UROBILINOGEN, URINE: 1 E.U./DL
WBC # BLD: 9.4 K/UL (ref 4–11)
WBC UA: 17 /HPF (ref 0–5)

## 2020-08-21 PROCEDURE — 6360000002 HC RX W HCPCS: Performed by: NURSE PRACTITIONER

## 2020-08-21 PROCEDURE — 6370000000 HC RX 637 (ALT 250 FOR IP): Performed by: NURSE PRACTITIONER

## 2020-08-21 PROCEDURE — 6360000004 HC RX CONTRAST MEDICATION: Performed by: NURSE PRACTITIONER

## 2020-08-21 PROCEDURE — 96375 TX/PRO/DX INJ NEW DRUG ADDON: CPT

## 2020-08-21 PROCEDURE — 85025 COMPLETE CBC W/AUTO DIFF WBC: CPT

## 2020-08-21 PROCEDURE — 80053 COMPREHEN METABOLIC PANEL: CPT

## 2020-08-21 PROCEDURE — 87086 URINE CULTURE/COLONY COUNT: CPT

## 2020-08-21 PROCEDURE — 99284 EMERGENCY DEPT VISIT MOD MDM: CPT

## 2020-08-21 PROCEDURE — 96361 HYDRATE IV INFUSION ADD-ON: CPT

## 2020-08-21 PROCEDURE — 2580000003 HC RX 258: Performed by: NURSE PRACTITIONER

## 2020-08-21 PROCEDURE — 74177 CT ABD & PELVIS W/CONTRAST: CPT

## 2020-08-21 PROCEDURE — 96374 THER/PROPH/DIAG INJ IV PUSH: CPT

## 2020-08-21 PROCEDURE — 81001 URINALYSIS AUTO W/SCOPE: CPT

## 2020-08-21 RX ORDER — CIPROFLOXACIN 500 MG/1
500 TABLET, FILM COATED ORAL ONCE
Status: COMPLETED | OUTPATIENT
Start: 2020-08-21 | End: 2020-08-21

## 2020-08-21 RX ORDER — SODIUM CHLORIDE 9 MG/ML
INJECTION, SOLUTION INTRAVENOUS CONTINUOUS
Status: DISCONTINUED | OUTPATIENT
Start: 2020-08-21 | End: 2020-08-21 | Stop reason: HOSPADM

## 2020-08-21 RX ORDER — MORPHINE SULFATE 4 MG/ML
4 INJECTION, SOLUTION INTRAMUSCULAR; INTRAVENOUS ONCE
Status: COMPLETED | OUTPATIENT
Start: 2020-08-21 | End: 2020-08-21

## 2020-08-21 RX ORDER — METRONIDAZOLE 500 MG/1
500 TABLET ORAL ONCE
Status: COMPLETED | OUTPATIENT
Start: 2020-08-21 | End: 2020-08-21

## 2020-08-21 RX ORDER — ONDANSETRON 2 MG/ML
4 INJECTION INTRAMUSCULAR; INTRAVENOUS ONCE
Status: COMPLETED | OUTPATIENT
Start: 2020-08-21 | End: 2020-08-21

## 2020-08-21 RX ORDER — CIPROFLOXACIN 500 MG/1
500 TABLET, FILM COATED ORAL 2 TIMES DAILY
Qty: 20 TABLET | Refills: 0 | Status: ON HOLD | OUTPATIENT
Start: 2020-08-21 | End: 2020-08-25 | Stop reason: SDUPTHER

## 2020-08-21 RX ORDER — METRONIDAZOLE 500 MG/1
500 TABLET ORAL 2 TIMES DAILY
Qty: 20 TABLET | Refills: 0 | Status: ON HOLD | OUTPATIENT
Start: 2020-08-21 | End: 2020-08-25 | Stop reason: SDUPTHER

## 2020-08-21 RX ADMIN — MORPHINE SULFATE 4 MG: 4 INJECTION, SOLUTION INTRAMUSCULAR; INTRAVENOUS at 19:13

## 2020-08-21 RX ADMIN — ONDANSETRON 4 MG: 2 INJECTION INTRAMUSCULAR; INTRAVENOUS at 19:13

## 2020-08-21 RX ADMIN — SODIUM CHLORIDE: 9 INJECTION, SOLUTION INTRAVENOUS at 19:19

## 2020-08-21 RX ADMIN — METRONIDAZOLE 500 MG: 500 TABLET ORAL at 21:50

## 2020-08-21 RX ADMIN — CIPROFLOXACIN 500 MG: 500 TABLET, FILM COATED ORAL at 21:50

## 2020-08-21 RX ADMIN — IOPAMIDOL 75 ML: 755 INJECTION, SOLUTION INTRAVENOUS at 20:18

## 2020-08-21 ASSESSMENT — ENCOUNTER SYMPTOMS
NAUSEA: 0
BACK PAIN: 1
RESPIRATORY NEGATIVE: 1
VOMITING: 0
ABDOMINAL PAIN: 1

## 2020-08-21 ASSESSMENT — PAIN DESCRIPTION - PAIN TYPE
TYPE: ACUTE PAIN;CHRONIC PAIN
TYPE: ACUTE PAIN

## 2020-08-21 ASSESSMENT — PAIN SCALES - GENERAL
PAINLEVEL_OUTOF10: 5
PAINLEVEL_OUTOF10: 7

## 2020-08-21 ASSESSMENT — PAIN DESCRIPTION - PROGRESSION: CLINICAL_PROGRESSION: GRADUALLY IMPROVING

## 2020-08-21 ASSESSMENT — PAIN DESCRIPTION - LOCATION
LOCATION: ABDOMEN
LOCATION: ABDOMEN

## 2020-08-21 ASSESSMENT — PAIN DESCRIPTION - FREQUENCY: FREQUENCY: INTERMITTENT

## 2020-08-21 ASSESSMENT — PAIN DESCRIPTION - DESCRIPTORS: DESCRIPTORS: BURNING;CRAMPING

## 2020-08-21 NOTE — ED PROVIDER NOTES
[address]  [address] [address]  Dept: [phone]  Loc: [phone]  eMERGENCYdEPARTMENT eNCOUnter      Pt Name: Nathaniel Harry  MRN: [id number]  Tabathagfjohnny 1950  Date of evaluation: 8/21/2020  Provider:BULMARO Zapata CNP     Evaluated by the advanced practice provider    [address] [place] [address]       Chief Complaint   Patient presents with    Flank Pain       CRITICAL CARE TIME         HISTORY OF PRESENT ILLNESS  (Location/Symptom, Timing/Onset, Context/Setting, Quality, Duration,Modifying Factors, Severity.)   Nathaniel Harry is a 72 y.o. female who presents to the emergency department complains of left lower quadrant abdominal pain radiating to the left flank. Onset around 9-930 this morning. Feels an increased sensation pressure to urinate but unable. Feels like she needs to have a bowel movement but cannot. Mild nausea. Negative for fever. Negative for chills. History of diverticulitis and chronic back pain. States that her tramadol that she takes for back pain is not helping this abdominal pain. Negative for nausea. Negative for vomiting. Pain is not made worse or better by anything. Nursing Notes were reviewedand agreed with or any disagreements were addressed in the HPI. REVIEW OF SYSTEMS    (2-9 systems for level 4, 10 or more for level 5)     Review of Systems   Constitutional: Positive for activity change and appetite change. Respiratory: Negative. Cardiovascular: Negative. Gastrointestinal: Positive for abdominal pain. Negative for nausea and vomiting. Genitourinary: Positive for difficulty urinating. Negative for dysuria. Musculoskeletal: Positive for back pain. Skin: Negative. Neurological: Negative. Except as noted above the remainder of the review of systems was reviewed and negative.        PAST MEDICAL HISTORY         Diagnosis Date    [facility]) [facility] [address]     CRF (chronic renal failure), stage 3 (moderate) ([facility])     Depression     Diverticulosis of colon     Fibromyalgia     Fibromyalgia     Hyperlipidemia     Joint ache     Kidney failure     states begining stage 4 since summer '17    Lumbar disc disease     Lumbar disc disease     Migraine     Muscle spasm of back     Pleural effusion     SVT (supraventricular tachycardia) ([facility] [address]     Varicose vein        SURGICAL HISTORY           Procedure Laterality Date    HERNIA REPAIR      right inguinal and hernia     HYSTERECTOMY      HYSTERECTOMY, VAGINAL      PAIN MANAGEMENT PROCEDURE Left 2020    LUMBAR EPIDURAL STEROID INJECTION LEFT L4-L5 performed by Aman Kirkpatrick MD at [address]      [unfilled]    ALLERGIES     Nsaids; Abilify [aripiprazole]; Benadryl [diphenhydramine]; Darvon [propoxyphene]; Dilaudid [hydromorphone hcl]; Hydrocodone-acetaminophen; Percocet [oxycodone-acetaminophen]; and Trintellix [vortioxetine]    FAMILY HISTORY           Problem Relation Age of Onset    Hypertension Mother     Heart Failure Mother     Cancer Mother         ovarian     Stroke Father     Asthma Father     Heart Failure Brother     Cancer Brother         lung    Diabetes Brother     Heart Disease Brother     Kidney Disease Brother     Other Brother         liver disease     Family Status   Relation Name Status    Mother      Father      Brother      Brother  Alive        SOCIAL HISTORY      reports that she has been smoking cigarettes. She started smoking about 6 years ago. She has a 11.25 pack-year smoking history. She has never used smokeless tobacco. She reports that she does not drink alcohol or use drugs.     PHYSICAL EXAM    (up to 7 for level 4, 8 or more for level 5)     ED Triage Vitals   Enc Vitals Group      BP       Pulse       Resp       Temp       Temp src SpO2       Weight       Height       Head Circumference       Peak Flow       Pain Score       Pain Loc       Pain Edu? Excl. in [address]? Physical Exam  Vitals signs and nursing note reviewed. Constitutional:       General: She is not in acute distress. Appearance: She is not ill-appearing. HENT:      Mouth/Throat:      Mouth: Mucous membranes are moist.      Pharynx: Oropharynx is clear. Cardiovascular:      Rate and Rhythm: Normal rate and regular rhythm. Pulses: Normal pulses. Heart sounds: Normal heart sounds. Pulmonary:      Effort: Pulmonary effort is normal.      Breath sounds: Normal breath sounds. Abdominal:      General: Bowel sounds are normal. There is no distension. Palpations: Abdomen is soft. There is no mass. Tenderness: There is abdominal tenderness in the suprapubic area and left lower quadrant. There is no right CVA tenderness, left CVA tenderness, guarding or rebound. Negative signs include Lopez's sign, Rovsing's sign, McBurney's sign, psoas sign and obturator sign. Hernia: No hernia is present. Comments: Abdomen mildly obese. Skin:     General: Skin is warm and dry. Capillary Refill: Capillary refill takes less than 2 seconds. Neurological:      General: No focal deficit present. Mental Status: She is alert and oriented to person, place, and time. DIAGNOSTIC RESULTS     EKG: All EKG's are interpreted by the Emergency Department Physician who either signs or Co-signs this chart in the absence of a cardiologist.    RADIOLOGY:   Non-plain film images such as CT, Ultrasound and MRI are read by the radiologist. Plain radiographic images are visualized and preliminarilyinterpreted by the emergency physician with the below findings:    Interpretation per the Radiologist below,if available at the time of this note:    CT ABDOMEN PELVIS W IV CONTRAST Additional Contrast? None   Final Result   Distal colonic diverticula.   Given minimal ill definition of the fat adjacent   sigmoid colon, acute uncomplicated sigmoid colonic diverticulitis could be   considered. The soft tissue 1.7 cm lobular density of the anterior wall of the   gallbladder, possibly redundant mucosa. An ultrasound is recommended further   evaluate as a polyp is a consideration. Polyps over 1 cm have increased risk   of malignancy with recommendation of cholecystectomy. LABS:  Labs Reviewed   CBC WITH AUTO DIFFERENTIAL - Abnormal; Notable for the following components:       Result Value    Neutrophils Absolute 7.9 (*)     Lymphocytes Absolute 0.9 (*)     All other components within normal limits    Narrative:     Performed at:  [facility]  [address], [employer]   Phone [phone]   COMPREHENSIVE METABOLIC PANEL - Abnormal; Notable for the following components:    Glucose 118 (*)     GFR Non- 49 (*)     GFR  59 (*)     Total Protein 6.2 (*)     ALT 7 (*)     AST 12 (*)     All other components within normal limits    Narrative:     Performed at:  [facility]  [facility] [employer]   Phone [phone]   URINE RT REFLEX TO CULTURE - Abnormal; Notable for the following components:    Clarity, UA CLOUDY (*)     All other components within normal limits    Narrative:     Performed at:  [facility]  [facility] [employer]   Phone [phone]   MICROSCOPIC URINALYSIS - Abnormal; Notable for the following components:    WBC, UA 17 (*)     Epithelial Cells, UA 8 (*)     All other components within normal limits    Narrative:     Performed at:  [facility]  [facility] [employer]   Phone [phone]   CULTURE, URINE       All other labs were within normal range or not returned as of this dictation.     EMERGENCY the emergency department for worsening of symptoms no improvement of symptoms or newly developing symptoms. Patient verbalized understanding and she is discharged home in good condition. This dictation was performed with a verbal recognition program (DRAGON) and it was checked for errors. It is possible that there are still dictated errors within this office note. If so, please bring any errors to my attention for an addendum. All efforts were made to ensure that this office note is accurate. CONSULTS:  None    PROCEDURES:  Procedures    FINAL IMPRESSION      1. Diverticulitis of colon    2. Abdominal pain, left lower quadrant    3. Gallbladder polyp          DISPOSITION/PLAN   [unfilled]    PATIENT REFERRED TO:  Isabel Hurley MD  [address] [place] [address]  [phone]    Schedule an appointment as soon as possible for a visit       Charles Childs MD  [address] [place] [address]  [phone]    Schedule an appointment as soon as possible for a visit   Gallbladder polyp, follow-up    Kate Gomez MD  [place] [address]  [phone]    Schedule an appointment as soon as possible for a visit   Gastroenterology referral for follow-up regarding diverticulitis    [facility] Emergency Department  [address] [facility]  [phone]    As needed, If symptoms worsen      DISCHARGE MEDICATIONS:  Discharge Medication List as of 8/21/2020  9:42 PM      START taking these medications    Details   ciprofloxacin (CIPRO) 500 MG tablet Take 1 tablet by mouth 2 times daily for 10 days, Disp-20 tablet,R-0Print      metroNIDAZOLE (FLAGYL) 500 MG tablet Take 1 tablet by mouth 2 times daily for 10 days, Disp-20 tablet,R-0Print             (Please note that portions of this note were completed with a voice recognition program.  Efforts were made to edit the dictations but occasionally words are mis-transcribed.)    Santana Lema, APRN - CNP          Benjamin Barrios, BULMARO - CNP  08/21/20 [id number]

## 2020-08-21 NOTE — ED NOTES
Bed: [facility]  Expected date:   Expected time:   Means of arrival: [facility]  Comments:     Faustina Liriano RN  08/21/20 [id number]

## 2020-08-23 ENCOUNTER — APPOINTMENT (OUTPATIENT)
Dept: CT IMAGING | Age: 70
DRG: 392 | End: 2020-08-23
Payer: MEDICARE

## 2020-08-23 ENCOUNTER — HOSPITAL ENCOUNTER (INPATIENT)
Age: 70
LOS: 2 days | Discharge: HOME | DRG: 392 | End: 2020-08-25
Attending: EMERGENCY MEDICINE | Admitting: INTERNAL MEDICINE
Payer: MEDICARE

## 2020-08-23 PROBLEM — K57.92 DIVERTICULITIS: Status: ACTIVE | Noted: 2020-08-23

## 2020-08-23 LAB
ABO/RH: NORMAL
ALBUMIN SERPL-MCNC: 2.9 G/DL (ref 3.4–5)
ALP BLD-CCNC: 70 U/L (ref 40–129)
ALT SERPL-CCNC: 6 U/L (ref 10–40)
ANION GAP SERPL CALCULATED.3IONS-SCNC: 9 MMOL/L (ref 3–16)
ANTIBODY SCREEN: NORMAL
AST SERPL-CCNC: 14 U/L (ref 15–37)
BASOPHILS ABSOLUTE: 0.1 K/UL (ref 0–0.2)
BASOPHILS RELATIVE PERCENT: 0.7 %
BILIRUB SERPL-MCNC: <0.2 MG/DL (ref 0–1)
BILIRUBIN DIRECT: <0.2 MG/DL (ref 0–0.3)
BILIRUBIN URINE: NEGATIVE
BILIRUBIN, INDIRECT: ABNORMAL MG/DL (ref 0–1)
BLOOD, URINE: NEGATIVE
BUN BLDV-MCNC: 17 MG/DL (ref 7–20)
CALCIUM SERPL-MCNC: 8.1 MG/DL (ref 8.3–10.6)
CHLORIDE BLD-SCNC: 102 MMOL/L (ref 99–110)
CLARITY: CLEAR
CO2: 24 MMOL/L (ref 21–32)
COLOR: YELLOW
CREAT SERPL-MCNC: 1.7 MG/DL (ref 0.6–1.2)
EOSINOPHILS ABSOLUTE: 0.2 K/UL (ref 0–0.6)
EOSINOPHILS RELATIVE PERCENT: 1.7 %
GFR AFRICAN AMERICAN: 36
GFR NON-AFRICAN AMERICAN: 30
GLUCOSE BLD-MCNC: 94 MG/DL (ref 70–99)
GLUCOSE URINE: NEGATIVE MG/DL
HCT VFR BLD CALC: 36.7 % (ref 36–48)
HEMOGLOBIN: 12.8 G/DL (ref 12–16)
KETONES, URINE: NEGATIVE MG/DL
LACTIC ACID: 0.9 MMOL/L (ref 0.4–2)
LACTIC ACID: 1.7 MMOL/L (ref 0.4–2)
LEUKOCYTE ESTERASE, URINE: NEGATIVE
LIPASE: 33 U/L (ref 13–60)
LYMPHOCYTES ABSOLUTE: 1.7 K/UL (ref 1–5.1)
LYMPHOCYTES RELATIVE PERCENT: 14.7 %
MCH RBC QN AUTO: 33.7 PG (ref 26–34)
MCHC RBC AUTO-ENTMCNC: 34.8 G/DL (ref 31–36)
MCV RBC AUTO: 96.8 FL (ref 80–100)
MICROSCOPIC EXAMINATION: NORMAL
MONOCYTES ABSOLUTE: 0.9 K/UL (ref 0–1.3)
MONOCYTES RELATIVE PERCENT: 8.3 %
NEUTROPHILS ABSOLUTE: 8.4 K/UL (ref 1.7–7.7)
NEUTROPHILS RELATIVE PERCENT: 74.6 %
NITRITE, URINE: NEGATIVE
PDW BLD-RTO: 14.4 % (ref 12.4–15.4)
PH UA: 5.5 (ref 5–8)
PLATELET # BLD: 191 K/UL (ref 135–450)
PMV BLD AUTO: 9.1 FL (ref 5–10.5)
POTASSIUM REFLEX MAGNESIUM: 4 MMOL/L (ref 3.5–5.1)
PROTEIN UA: NEGATIVE MG/DL
RBC # BLD: 3.79 M/UL (ref 4–5.2)
SODIUM BLD-SCNC: 135 MMOL/L (ref 136–145)
SPECIFIC GRAVITY UA: 1.02 (ref 1–1.03)
TOTAL PROTEIN: 5.5 G/DL (ref 6.4–8.2)
URINE CULTURE, ROUTINE: NORMAL
URINE REFLEX TO CULTURE: NORMAL
URINE TYPE: NORMAL
UROBILINOGEN, URINE: 0.2 E.U./DL
WBC # BLD: 11.3 K/UL (ref 4–11)

## 2020-08-23 PROCEDURE — 99222 1ST HOSP IP/OBS MODERATE 55: CPT | Performed by: SURGERY

## 2020-08-23 PROCEDURE — 6360000002 HC RX W HCPCS: Performed by: INTERNAL MEDICINE

## 2020-08-23 PROCEDURE — 96375 TX/PRO/DX INJ NEW DRUG ADDON: CPT

## 2020-08-23 PROCEDURE — 74176 CT ABD & PELVIS W/O CONTRAST: CPT

## 2020-08-23 PROCEDURE — 2580000003 HC RX 258: Performed by: EMERGENCY MEDICINE

## 2020-08-23 PROCEDURE — 2500000003 HC RX 250 WO HCPCS: Performed by: INTERNAL MEDICINE

## 2020-08-23 PROCEDURE — 86850 RBC ANTIBODY SCREEN: CPT

## 2020-08-23 PROCEDURE — 81003 URINALYSIS AUTO W/O SCOPE: CPT

## 2020-08-23 PROCEDURE — 96365 THER/PROPH/DIAG IV INF INIT: CPT

## 2020-08-23 PROCEDURE — 85025 COMPLETE CBC W/AUTO DIFF WBC: CPT

## 2020-08-23 PROCEDURE — 99284 EMERGENCY DEPT VISIT MOD MDM: CPT

## 2020-08-23 PROCEDURE — 80076 HEPATIC FUNCTION PANEL: CPT

## 2020-08-23 PROCEDURE — 6370000000 HC RX 637 (ALT 250 FOR IP): Performed by: INTERNAL MEDICINE

## 2020-08-23 PROCEDURE — 6360000002 HC RX W HCPCS: Performed by: EMERGENCY MEDICINE

## 2020-08-23 PROCEDURE — 86901 BLOOD TYPING SEROLOGIC RH(D): CPT

## 2020-08-23 PROCEDURE — 96361 HYDRATE IV INFUSION ADD-ON: CPT

## 2020-08-23 PROCEDURE — 96368 THER/DIAG CONCURRENT INF: CPT

## 2020-08-23 PROCEDURE — 96367 TX/PROPH/DG ADDL SEQ IV INF: CPT

## 2020-08-23 PROCEDURE — 80048 BASIC METABOLIC PNL TOTAL CA: CPT

## 2020-08-23 PROCEDURE — 86900 BLOOD TYPING SEROLOGIC ABO: CPT

## 2020-08-23 PROCEDURE — 83690 ASSAY OF LIPASE: CPT

## 2020-08-23 PROCEDURE — 36415 COLL VENOUS BLD VENIPUNCTURE: CPT

## 2020-08-23 PROCEDURE — 2580000003 HC RX 258: Performed by: INTERNAL MEDICINE

## 2020-08-23 PROCEDURE — 96366 THER/PROPH/DIAG IV INF ADDON: CPT

## 2020-08-23 PROCEDURE — 1200000000 HC SEMI PRIVATE

## 2020-08-23 PROCEDURE — 83605 ASSAY OF LACTIC ACID: CPT

## 2020-08-23 RX ORDER — FLUTICASONE PROPIONATE 50 MCG
1 SPRAY, SUSPENSION (ML) NASAL DAILY
Status: DISCONTINUED | OUTPATIENT
Start: 2020-08-23 | End: 2020-08-25 | Stop reason: HOSPADM

## 2020-08-23 RX ORDER — GABAPENTIN 300 MG/1
600 CAPSULE ORAL 4 TIMES DAILY
Status: DISCONTINUED | OUTPATIENT
Start: 2020-08-23 | End: 2020-08-23

## 2020-08-23 RX ORDER — DICYCLOMINE HYDROCHLORIDE 10 MG/1
10 CAPSULE ORAL 4 TIMES DAILY PRN
Status: DISCONTINUED | OUTPATIENT
Start: 2020-08-23 | End: 2020-08-25 | Stop reason: HOSPADM

## 2020-08-23 RX ORDER — ALBUTEROL SULFATE 2.5 MG/3ML
2.5 SOLUTION RESPIRATORY (INHALATION) EVERY 6 HOURS PRN
Status: DISCONTINUED | OUTPATIENT
Start: 2020-08-23 | End: 2020-08-25 | Stop reason: HOSPADM

## 2020-08-23 RX ORDER — 0.9 % SODIUM CHLORIDE 0.9 %
1000 INTRAVENOUS SOLUTION INTRAVENOUS ONCE
Status: COMPLETED | OUTPATIENT
Start: 2020-08-23 | End: 2020-08-23

## 2020-08-23 RX ORDER — ONDANSETRON 2 MG/ML
4 INJECTION INTRAMUSCULAR; INTRAVENOUS EVERY 6 HOURS PRN
Status: DISCONTINUED | OUTPATIENT
Start: 2020-08-23 | End: 2020-08-25 | Stop reason: HOSPADM

## 2020-08-23 RX ORDER — CITALOPRAM 20 MG/1
40 TABLET ORAL DAILY
Status: DISCONTINUED | OUTPATIENT
Start: 2020-08-23 | End: 2020-08-25 | Stop reason: HOSPADM

## 2020-08-23 RX ORDER — LEVOTHYROXINE SODIUM 0.03 MG/1
25 TABLET ORAL DAILY
Status: DISCONTINUED | OUTPATIENT
Start: 2020-08-23 | End: 2020-08-25 | Stop reason: HOSPADM

## 2020-08-23 RX ORDER — ATORVASTATIN CALCIUM 10 MG/1
10 TABLET, FILM COATED ORAL DAILY
Status: DISCONTINUED | OUTPATIENT
Start: 2020-08-23 | End: 2020-08-25 | Stop reason: HOSPADM

## 2020-08-23 RX ORDER — CIPROFLOXACIN 2 MG/ML
400 INJECTION, SOLUTION INTRAVENOUS EVERY 12 HOURS
Status: DISCONTINUED | OUTPATIENT
Start: 2020-08-23 | End: 2020-08-25 | Stop reason: HOSPADM

## 2020-08-23 RX ORDER — LANOLIN ALCOHOL/MO/W.PET/CERES
3 CREAM (GRAM) TOPICAL NIGHTLY PRN
Status: DISCONTINUED | OUTPATIENT
Start: 2020-08-23 | End: 2020-08-25 | Stop reason: HOSPADM

## 2020-08-23 RX ORDER — ACETAMINOPHEN 325 MG/1
650 TABLET ORAL EVERY 4 HOURS PRN
Status: DISCONTINUED | OUTPATIENT
Start: 2020-08-23 | End: 2020-08-25 | Stop reason: HOSPADM

## 2020-08-23 RX ORDER — TRAMADOL HYDROCHLORIDE 50 MG/1
50 TABLET ORAL 2 TIMES DAILY PRN
Status: DISCONTINUED | OUTPATIENT
Start: 2020-08-23 | End: 2020-08-24

## 2020-08-23 RX ORDER — GABAPENTIN 300 MG/1
600 CAPSULE ORAL 3 TIMES DAILY
Status: DISCONTINUED | OUTPATIENT
Start: 2020-08-23 | End: 2020-08-25 | Stop reason: HOSPADM

## 2020-08-23 RX ORDER — SODIUM CHLORIDE 9 MG/ML
INJECTION, SOLUTION INTRAVENOUS CONTINUOUS
Status: DISCONTINUED | OUTPATIENT
Start: 2020-08-23 | End: 2020-08-25 | Stop reason: HOSPADM

## 2020-08-23 RX ADMIN — GABAPENTIN 600 MG: 300 CAPSULE ORAL at 13:26

## 2020-08-23 RX ADMIN — METRONIDAZOLE 500 MG: 500 INJECTION, SOLUTION INTRAVENOUS at 05:54

## 2020-08-23 RX ADMIN — METRONIDAZOLE 500 MG: 500 INJECTION, SOLUTION INTRAVENOUS at 20:26

## 2020-08-23 RX ADMIN — CIPROFLOXACIN 400 MG: 2 INJECTION, SOLUTION INTRAVENOUS at 16:34

## 2020-08-23 RX ADMIN — SODIUM CHLORIDE: 9 INJECTION, SOLUTION INTRAVENOUS at 10:04

## 2020-08-23 RX ADMIN — ATORVASTATIN CALCIUM 10 MG: 10 TABLET, FILM COATED ORAL at 09:35

## 2020-08-23 RX ADMIN — ACETAMINOPHEN 650 MG: 325 TABLET ORAL at 11:52

## 2020-08-23 RX ADMIN — PIPERACILLIN AND TAZOBACTAM 3.38 G: 3; .375 INJECTION, POWDER, LYOPHILIZED, FOR SOLUTION INTRAVENOUS at 03:14

## 2020-08-23 RX ADMIN — METRONIDAZOLE 500 MG: 500 INJECTION, SOLUTION INTRAVENOUS at 13:26

## 2020-08-23 RX ADMIN — CIPROFLOXACIN 400 MG: 2 INJECTION, SOLUTION INTRAVENOUS at 05:53

## 2020-08-23 RX ADMIN — TRAMADOL HYDROCHLORIDE 50 MG: 50 TABLET, FILM COATED ORAL at 05:47

## 2020-08-23 RX ADMIN — TRAMADOL HYDROCHLORIDE 50 MG: 50 TABLET, FILM COATED ORAL at 20:26

## 2020-08-23 RX ADMIN — ONDANSETRON 4 MG: 2 INJECTION INTRAMUSCULAR; INTRAVENOUS at 20:26

## 2020-08-23 RX ADMIN — SODIUM CHLORIDE 1000 ML: 9 INJECTION, SOLUTION INTRAVENOUS at 02:07

## 2020-08-23 RX ADMIN — Medication 3 MG: at 20:26

## 2020-08-23 RX ADMIN — GABAPENTIN 600 MG: 300 CAPSULE ORAL at 09:35

## 2020-08-23 RX ADMIN — GABAPENTIN 600 MG: 300 CAPSULE ORAL at 20:26

## 2020-08-23 RX ADMIN — LEVOTHYROXINE SODIUM 25 MCG: 0.03 TABLET ORAL at 05:47

## 2020-08-23 RX ADMIN — CITALOPRAM HYDROBROMIDE 40 MG: 20 TABLET ORAL at 21:37

## 2020-08-23 RX ADMIN — SODIUM CHLORIDE 1000 ML: 9 INJECTION, SOLUTION INTRAVENOUS at 03:15

## 2020-08-23 ASSESSMENT — PAIN DESCRIPTION - FREQUENCY
FREQUENCY: CONTINUOUS

## 2020-08-23 ASSESSMENT — PAIN SCALES - GENERAL
PAINLEVEL_OUTOF10: 0
PAINLEVEL_OUTOF10: 7
PAINLEVEL_OUTOF10: 2
PAINLEVEL_OUTOF10: 5
PAINLEVEL_OUTOF10: 3
PAINLEVEL_OUTOF10: 4
PAINLEVEL_OUTOF10: 0
PAINLEVEL_OUTOF10: 6
PAINLEVEL_OUTOF10: 0

## 2020-08-23 ASSESSMENT — PAIN DESCRIPTION - DESCRIPTORS
DESCRIPTORS: ACHING;DISCOMFORT
DESCRIPTORS: ACHING;PRESSURE;SHARP
DESCRIPTORS: ACHING
DESCRIPTORS: ACHING

## 2020-08-23 ASSESSMENT — PAIN DESCRIPTION - PAIN TYPE
TYPE: ACUTE PAIN

## 2020-08-23 ASSESSMENT — PAIN DESCRIPTION - PROGRESSION
CLINICAL_PROGRESSION: GRADUALLY IMPROVING
CLINICAL_PROGRESSION: GRADUALLY WORSENING

## 2020-08-23 ASSESSMENT — ENCOUNTER SYMPTOMS
SHORTNESS OF BREATH: 0
ABDOMINAL DISTENTION: 0
CHEST TIGHTNESS: 0
WHEEZING: 0
COUGH: 0
ABDOMINAL PAIN: 1
PHOTOPHOBIA: 0
STRIDOR: 0
EYE ITCHING: 0
APNEA: 0
EYE DISCHARGE: 0
EYE PAIN: 0
VOMITING: 1
NAUSEA: 1
EYE REDNESS: 0
CHOKING: 0

## 2020-08-23 ASSESSMENT — PAIN DESCRIPTION - LOCATION
LOCATION: ABDOMEN

## 2020-08-23 ASSESSMENT — PAIN DESCRIPTION - ONSET
ONSET: GRADUAL

## 2020-08-23 ASSESSMENT — PAIN SCALES - WONG BAKER
WONGBAKER_NUMERICALRESPONSE: 0
WONGBAKER_NUMERICALRESPONSE: 0

## 2020-08-23 ASSESSMENT — PAIN - FUNCTIONAL ASSESSMENT
PAIN_FUNCTIONAL_ASSESSMENT: ACTIVITIES ARE NOT PREVENTED

## 2020-08-23 ASSESSMENT — PAIN DESCRIPTION - ORIENTATION
ORIENTATION: MID;LOWER
ORIENTATION: LOWER;MID
ORIENTATION: LOWER;MID

## 2020-08-23 NOTE — PROGRESS NOTES
D:  Dr. Min Caceres at bedside, updating this RN and pt about the plan of care A: this RN reported to physician that pt only takes gabapenin TID R: order changed on [facility]

## 2020-08-23 NOTE — CONSULTS
mouth 3 times daily. )  citalopram (CELEXA) 40 MG tablet, TAKE 1 TABLET BY MOUTH DAILY  lovastatin (MEVACOR) 40 MG tablet, TAKE 1 TABLET BY MOUTH EVERY DAY. traMADol (ULTRAM) 50 MG tablet, Take 1 tablet by mouth 2 times daily as needed for Pain for up to 90 days. dicyclomine (BENTYL) 10 MG capsule, Take 1 capsule by mouth 4 times daily as needed (abdominal cramping and diarrhea)  promethazine (PHENERGAN) 12.5 MG tablet, Take 1 tablet by mouth every 8 hours as needed for Nausea TAKE 1 TABLET BY MOUTH EVERY DAY AS NEEDED  [DISCONTINUED] Calcium Polycarbophil (FIBER) 625 MG TABS, Take 1 tablet by mouth daily  fluticasone (FLONASE) 50 MCG/ACT nasal spray, 1 spray by Each Nare route daily 1 Spray in each nostril  albuterol (PROVENTIL) (2.5 MG/3ML) 0.083% nebulizer solution, Take 3 mLs by nebulization every 6 hours as needed for Wheezing  PROVENTIL  (90 BASE) MCG/ACT inhaler, INHALE 2 PUFFS BY MOUTH EVERY 6 HOURS AS NEEDED FOR WHEEZING    Allergies:  Nsaids; Abilify [aripiprazole]; Benadryl [diphenhydramine]; Darvon [propoxyphene]; Dilaudid [hydromorphone hcl]; Hydrocodone-acetaminophen; Percocet [oxycodone-acetaminophen]; and Trintellix [vortioxetine]    Social History:   TOBACCO:   reports that she has been smoking cigarettes. She started smoking about 6 years ago. She has a 11.25 pack-year smoking history. She has never used smokeless tobacco.  ETOH:   reports no history of alcohol use. DRUGS:   reports no history of drug use.       Family History:       Problem Relation Age of Onset    Hypertension Mother     Heart Failure Mother     Cancer Mother         ovarian     Stroke Father     Asthma Father     Heart Failure Brother     Cancer Brother         lung    Diabetes Brother     Heart Disease Brother     Kidney Disease Brother     Other Brother         liver disease       REVIEW OF SYSTEMS:    CONSTITUTIONAL:  negative  HEENT:  negative  CARDIOVASCULAR:  negative  GASTROINTESTINAL:  positive for change in bowel habits and abdominal pain  GENITOURINARY:  negative  HEMATOLOGIC/LYMPHATIC:  negative  ENDOCRINE:  negative  All other systems negative    PHYSICAL EXAM:    VITALS:  /65   Pulse 63   Temp 98.7 °F (37.1 °C) (Oral)   Resp 16   Ht 5' 6\" (1.676 m)   Wt 178 lb 9.2 oz (81 kg)   LMP  (LMP Unknown)   SpO2 95%   BMI 28.82 kg/m²   INTAKE/OUTPUT:   No intake/output data recorded.   I/O this shift:  In: 360 [P.O.:360]  Out: 250 [Urine:250]  CONSTITUTIONAL:  awake, alert, no apparent distress and normal weight  ENT:  normocepalic, without obvious abnormality  NECK:  supple, symmetrical, trachea midline   LUNGS:  clear to auscultation, no crackles or wheezing  CARDIOVASCULAR:  regular rate and rhythm and no murmur noted  ABDOMEN: soft, non distended, tender LLQ, no masses or hernias  MUSCULOSKELETAL:  0+ pitting edema lower extremities  NEUROLOGIC:  Mental Status Exam:  Level of Alertness:   awake  Orientation:   person, place, time      ASSESSMENT AND PLAN:    Sigmoid diverticulitis   On IV antibiotics   Given recurring nature will need to consider elective colectomy at some point   Will follow along    Electronically signed by Evangelina Knowles MD on 8/23/2020 at 1:38 PM      Evangelina Knowles

## 2020-08-23 NOTE — H&P
Brisa Jaimes MD   ciprofloxacin (CIPRO) 500 MG tablet Take 1 tablet by mouth 2 times daily for 10 days 8/21/20 8/31/20  BULMARO Toth CNP   metroNIDAZOLE (FLAGYL) 500 MG tablet Take 1 tablet by mouth 2 times daily for 10 days 8/21/20 8/31/20  BULMARO Toth CNP   traMADol (ULTRAM) 50 MG tablet Take 1 tablet by mouth 2 times daily as needed for Pain for up to 90 days. 6/23/20 9/21/20  Brisa Jaimes MD   Calcium Polycarbophil (FIBER) 625 MG TABS Take 1 tablet by mouth daily 12/5/19   Brisa Jaimes MD   dicyclomine (BENTYL) 10 MG capsule Take 1 capsule by mouth 4 times daily as needed (abdominal cramping and diarrhea) 12/5/19   Brisa Jaimes MD   promethazine (PHENERGAN) 12.5 MG tablet Take 1 tablet by mouth every 8 hours as needed for Nausea TAKE 1 TABLET BY MOUTH EVERY DAY AS NEEDED 12/5/19   Brisa Jaimes MD   fluticasone (FLONASE) 50 MCG/ACT nasal spray 1 spray by Each Nare route daily 1 Spray in each nostril 2/20/19   Brisa Jaimes MD   albuterol (PROVENTIL) (2.5 MG/3ML) 0.083% nebulizer solution Take 3 mLs by nebulization every 6 hours as needed for Wheezing 3/27/17   Traci Moss MD   PROVENTIL  (90 BASE) MCG/ACT inhaler INHALE 2 PUFFS BY MOUTH EVERY 6 HOURS AS NEEDED FOR WHEEZING 12/5/16   Christi Dey MD       Allergies:  Nsaids; Abilify [aripiprazole]; Benadryl [diphenhydramine]; Darvon [propoxyphene]; Dilaudid [hydromorphone hcl]; Hydrocodone-acetaminophen; Percocet [oxycodone-acetaminophen]; and Trintellix [vortioxetine]    Social History:   TOBACCO:   reports that she has been smoking cigarettes. She started smoking about 6 years ago. She has a 11.25 pack-year smoking history. She has never used smokeless tobacco.      ETOH:   reports no history of alcohol use.     Family History:       Problem Relation Age of Onset    Hypertension Mother     Heart Failure Mother     Cancer Mother         ovarian     Stroke Father     Asthma Father     Heart Failure Brother     Cancer Brother         lung    Diabetes Brother     Heart Disease Brother     Kidney Disease Brother     Other Brother         liver disease       Early CAD no  Diabetes Mellitus no  Cancer no      REVIEW OF SYSTEMS:  CONSTITUTIONAL:  Neg   Recent weight changes,fatigue,fever,chills or night sweats  EYES:  Neg  blurriness,tearing,itching or acute change in vision  EARS:  Neg  hearing loss,tinnitus,vertigo,discharge or earache. NOSE:  Neg  rhinorrhea,sneezing,itching,allergy or epistaxis  MOUTH/THROAT:  Neg  bleeding gums,hoarseness or sore throat. RESPIRATORY:   Neg  SOB,wheeze,cough,sputum,hemoptysis or brnochitis  CARDIOVASCULAR   Neg  chest pain,palpitations,dyspnea on exertion,orthopnea,paroxysmal nocturnal dyspnea or edema  GASTROINTESTINAL:  Neg   Appetite changes,nausea,vomiting,or diarrhea,indigestion,dysphagia,change in bowel movements, or abdominal pain. GENITOURINARY:  Neg  Urinary frequency,hesitancy,urgency,polyuria,dysuria,hematuria,nocturia,or incontinence. HEMATOLOGIC/LYMPHATIC:  Neg  Anemia,bleeding tendency  MUSCULOSKELETAL:  Neg   New myalgias,bone pain,joint pain,swelling or stiffness and has had no change in gait. NEUROLOGICAL:  Neg  Loss of Consciousness,memeory loss,forgetfulness,periods of confusion,difficulty concentrating,seizures,decline in intellect,nervousness,insomina,aphasia or dysarthria. SKIN :  Neg  skin or hair changes,and has no itching,rashes,sores. Denies breast lumps,masses,pain or discharge. PSYCHIATRIC:  Neg Depression,personality changes,anxiety. ENDOCRINE:  Neg  polydipsia,polyuria,abnormal weight changes,heat /cold intolerance. ALL/IMM :  Neg reactions to drugs other than listed,food,insects,skin rash,trouble breathing,local or general lymph node enlargement or tenderness.         PHYSICAL EXAM:  /65   Pulse 63   Temp 98.7 °F (37.1 °C) (Oral)   Resp 16   Ht 5' 6\" (1.676 m)   Wt 178 lb 9.2 oz (81 kg)   LMP  (LMP Unknown)   SpO2 results found for: PHART, CDN7RRA, PO2ART  No results for input(s): INR in the last 72 hours. Recent Labs     08/21/20  1926 08/23/20  0430   COLORU YELLOW YELLOW   PHUR 5.5 5.5   WBCUA 17*  --    RBCUA 1  --    CLARITYU CLOUDY* Clear   SPECGRAV 1.026 1.017   LEUKOCYTESUR Negative Negative   UROBILINOGEN 1.0 0.2   BILIRUBINUR Negative Negative   BLOODU Negative Negative   GLUCOSEU Negative Negative        EKG Independently reviewed: normal EKG, normal sinus rhythm, unchanged from previous tracings. Previous medical records personally reviewed and analyzed     PHYSICIANS CERTIFICATION:    I certify that Taylor Calderon is expected to be hospitalized for more than 2 midnights based on the following assessment and plan:        Assessment & Plan:    Patient Active Problem List:     Pleural effusion     SVT (supraventricular tachycardia) ([facility])     Varicose vein     Fibromyalgia     Lumbar disc disease     Migraine     Hypercholesteremia     Abdominal pain     Peripheral edema     Anxiety state     Herniated lumbar intervertebral disc     Leg pain, right     Leg pain, left     Chronic low back pain     Migraine without status migrainosus, not intractable     Spider veins of both lower extremities     Vasovagal syncope     Headache, chronic daily     Syncope and collapse     Chronic renal failure, stage 3 (moderate) ([facility])     Chronic pain syndrome     Mild episode of recurrent major depressive disorder ([facility])     Diverticulitis          - abdominal pain   - diverticulitis   - cayden - as per patient she also has CKD3  Prior creat was 1.1 now 1.7  - hypovolemia   - hypothyroidism     Plan   atbx cipro and flagyl  Bowel rest   Pain control   Started IVF  Surgery following     Mandy Hood MD    The patient and / or the family were informed of the results of any tests, a time was given to answer questions, a plan was proposed and they agreed with plan.     Thank you Dr. Christopher Doyle MD for the opportunity to be involved

## 2020-08-23 NOTE — ED PROVIDER NOTES
[address]      Pt Name: Frank Saravia  MRN: [id number]  [place] 1950  Date of evaluation: 8/23/2020  Provider: Og Jones MD    CHIEF COMPLAINT       Chief Complaint   Patient presents with    Abdominal Pain     pt brought in by EMS. pt states she was seen here yesterday has increased abd pain. HISTORY OF PRESENT ILLNESS    Frank Saravia is a 72 y.o. female who presents to the emergency department with abdominal pain. LLQ pain for last few days. Here one day prior and diagnosed with diverticulitis. Pain is acute 9/10 sharp and constant in nature. Has been taking OTC medications with minimal relief. Nothing makes symptoms better but movement makes symptoms worse. This has never happened before. No other associated symptoms other than previously mentioned. Cannot tolerate po. Nursing Notes were reviewed. Including nursing noted for FM, Surgical History, Past Medical History, Social History, vitals, and allergies; agree with all. REVIEW OF SYSTEMS       Review of Systems   Constitutional: Negative for activity change, appetite change, chills, diaphoresis, fatigue, fever and unexpected weight change. HENT: Negative for congestion, dental problem, drooling and ear discharge. Eyes: Negative for photophobia, pain, discharge, redness and itching. Respiratory: Negative for apnea, cough, choking, chest tightness, shortness of breath, wheezing and stridor. Cardiovascular: Negative for chest pain and leg swelling. Gastrointestinal: Positive for abdominal pain, nausea and vomiting. Negative for abdominal distention. Endocrine: Negative for polyphagia and polyuria. Genitourinary: Negative for vaginal bleeding, vaginal discharge and vaginal pain. Musculoskeletal: Negative for arthralgias. Neurological: Negative for dizziness, facial asymmetry and headaches. Hematological: Negative for adenopathy.  Does not bruise/bleed easily. Psychiatric/Behavioral: Negative for agitation, behavioral problems, confusion, decreased concentration, dysphoric mood, hallucinations, self-injury, sleep disturbance and suicidal ideas. The patient is not nervous/anxious and is not hyperactive. Except as noted above the remainder of the review of systems was reviewed and negative.      PAST MEDICAL HISTORY     Past Medical History:   Diagnosis Date    COPD [facility]chronic obstructive pulmonary disease) ([facility] [address]     CRF (chronic renal failure), stage 3 (moderate) ([facility]     Depression     Diverticulosis of colon     Fibromyalgia     Fibromyalgia     Hyperlipidemia     Joint ache     Kidney failure     states begining stage 4 since summer '17    Lumbar disc disease     Lumbar disc disease     Migraine     Muscle spasm of back     Pleural effusion     SVT [facility]supraventricular tachycardia) ([facility]     Varicose vein        SURGICAL HISTORY       Past Surgical History:   Procedure Laterality Date    HERNIA REPAIR      right inguinal and hernia     HYSTERECTOMY      HYSTERECTOMY, VAGINAL      PAIN MANAGEMENT PROCEDURE Left 6/12/2020    LUMBAR EPIDURAL STEROID INJECTION LEFT L4-L5 performed by Rima Banda MD at [address]       Previous Medications    ALBUTEROL (PROVENTIL) (2.5 MG/3ML) 0.083% NEBULIZER SOLUTION    Take 3 mLs by nebulization every 6 hours as needed for Wheezing    CALCIUM POLYCARBOPHIL (FIBER) 625 MG TABS    Take 1 tablet by mouth daily    CIPROFLOXACIN (CIPRO) 500 MG TABLET    Take 1 tablet by mouth 2 times daily for 10 days    CITALOPRAM (CELEXA) 40 MG TABLET    TAKE 1 TABLET BY MOUTH DAILY    DICYCLOMINE (BENTYL) 10 MG CAPSULE    Take 1 capsule by mouth 4 times daily as needed (abdominal cramping and diarrhea)    FLUTICASONE (FLONASE) 50 MCG/ACT NASAL SPRAY    1 spray by Each Nare route daily 1 Spray in each nostril    GABAPENTIN (NEURONTIN) 600 MG TABLET    Take 1 tablet by mouth 4 times daily for 360 doses. LEVOTHYROXINE (SYNTHROID) 25 MCG TABLET    TAKE 1 TABLET BY MOUTH DAILY    LOVASTATIN (MEVACOR) 40 MG TABLET    TAKE 1 TABLET BY MOUTH EVERY DAY. METRONIDAZOLE (FLAGYL) 500 MG TABLET    Take 1 tablet by mouth 2 times daily for 10 days    PROMETHAZINE (PHENERGAN) 12.5 MG TABLET    Take 1 tablet by mouth every 8 hours as needed for Nausea TAKE 1 TABLET BY MOUTH EVERY DAY AS NEEDED    PROVENTIL  (90 BASE) MCG/ACT INHALER    INHALE 2 PUFFS BY MOUTH EVERY 6 HOURS AS NEEDED FOR WHEEZING    TRAMADOL (ULTRAM) 50 MG TABLET    Take 1 tablet by mouth 2 times daily as needed for Pain for up to 90 days. ALLERGIES     Nsaids; Abilify [aripiprazole]; Benadryl [diphenhydramine]; Darvon [propoxyphene]; Dilaudid [hydromorphone hcl]; Hydrocodone-acetaminophen; Percocet [oxycodone-acetaminophen]; and Trintellix [vortioxetine]    FAMILY HISTORY        Family History   Problem Relation Age of Onset    Hypertension Mother     Heart Failure Mother     Cancer Mother         ovarian     Stroke Father     Asthma Father     Heart Failure Brother     Cancer Brother         lung    Diabetes Brother     Heart Disease Brother     Kidney Disease Brother     Other Brother         liver disease       SOCIAL HISTORY       Social History     Socioeconomic History    Marital status:       Spouse name: None    Number of children: 2    Years of education: None    Highest education level: None   Occupational History    Occupation: NA   Social Needs    Financial resource strain: None    Food insecurity     Worry: None     Inability: None    Transportation needs     Medical: None     Non-medical: None   Tobacco Use    Smoking status: Current Every Day Smoker     Packs/day: 0.25     Years: 45.00     Pack years: 11.25     Types: Cigarettes     Start date: 8/16/2014    Smokeless tobacco: Never Used    Tobacco comment: would like to quit   Substance and Sexual Activity    Alcohol use: No     Alcohol/week: 0.0 standard drinks    Drug use: No    Sexual activity: Never   Lifestyle    Physical activity     Days per week: None     Minutes per session: None    Stress: None   Relationships    Social connections     Talks on phone: None     Gets together: None     Attends Sikhism service: None     Active member of club or organization: None     Attends meetings of clubs or organizations: None     Relationship status: None    Intimate partner violence     Fear of current or ex partner: None     Emotionally abused: None     Physically abused: None     Forced sexual activity: None   Other Topics Concern    None   Social History Narrative    None       PHYSICAL EXAM       ED Triage Vitals [08/23/20 0113]   BP Temp Temp Source Pulse Resp SpO2 Height Weight   101/85 98.2 °F (36.8 °C) Oral 55 14 100 % 5' 6\" (1.676 m) 178 lb 9.2 oz (81 kg)       Physical Exam  Vitals signs and nursing note reviewed. Constitutional:       Appearance: She is well-developed. She is ill-appearing. She is not toxic-appearing or diaphoretic. HENT:      Head: Normocephalic and atraumatic. Right Ear: External ear normal.      Left Ear: External ear normal.   Eyes:      General:         Right eye: No discharge. Left eye: No discharge. Conjunctiva/sclera: Conjunctivae normal.      Pupils: Pupils are equal, round, and reactive to light. Neck:      Musculoskeletal: Normal range of motion and neck supple. Cardiovascular:      Rate and Rhythm: Normal rate and regular rhythm. Heart sounds: No murmur. Pulmonary:      Effort: Pulmonary effort is normal. No respiratory distress. Breath sounds: Normal breath sounds. No wheezing or rales. Abdominal:      General: Bowel sounds are normal. There is no distension. Palpations: Abdomen is soft. There is no mass. Tenderness: There is abdominal tenderness in the right lower quadrant.  There is no guarding or rebound. Genitourinary:     Comments: Deferred  Musculoskeletal: Normal range of motion. General: No deformity. Skin:     General: Skin is warm. Findings: No erythema or rash. Neurological:      Mental Status: She is alert and oriented to person, place, and time. She is not disoriented. Cranial Nerves: No cranial nerve deficit. Motor: No atrophy or abnormal muscle tone. Coordination: Coordination normal.   Psychiatric:         Behavior: Behavior normal.         Thought Content: Thought content normal.       DIAGNOSTIC RESULTS     EKG: All EKG's are interpreted by the Emergency Department Physician who either signs or Co-signs this chart in the absence of acardiologist.    None    RADIOLOGY:   Non-plain film images such as CT, Ultrasoundand MRI are read by the radiologist. Plain radiographic images are visualized and preliminarily interpreted by the emergency physician with the below findings:    Impression    Mild inflammatory changes associated the sigmoid colon. Radha Smiles are slightly    increased but remain mild, findings consistent with uncomplicated    diverticulitis.  No perforation.  No free air or abscess.       ED BEDSIDE ULTRASOUND:   Performed by ED Physician - none    LABS:  Labs Reviewed   CBC WITH AUTO DIFFERENTIAL - Abnormal; Notable for the following components:       Result Value    WBC 11.3 (*)     RBC 3.79 (*)     Neutrophils Absolute 8.4 (*)     All other components within normal limits    Narrative:     Performed at:  [address] [address]   Phone [phone]   BASIC METABOLIC PANEL W/ REFLEX TO MG FOR LOW K - Abnormal; Notable for the following components:    Sodium 135 (*)     CREATININE 1.7 (*)     GFR Non- 30 (*)     GFR  36 (*)     Calcium 8.1 (*)     All other components within normal limits    Narrative:     Performed at:  [facility]  [address] [place] [address]   Phone [phone]   HEPATIC FUNCTION PANEL - Abnormal; Notable for the following components: Total Protein 5.5 (*)     Alb 2.9 (*)     ALT 6 (*)     AST 14 (*)     All other components within normal limits    Narrative:     Performed at:  [facility]  [address] [place] [address]   Phone [phone]   LACTIC ACID, PLASMA    Narrative:     Performed at:  [facility]  [address] [place] [address]   Phone [phone]   LIPASE    Narrative:     Performed at:  [facility]  [address] [place] [address]   Phone [phone]   LACTIC ACID, PLASMA    Narrative:     Performed at:  [facility]  [address] [place] [address]   Phone [phone]   URINE RT REFLEX TO CULTURE   TYPE AND SCREEN    Narrative:     Performed at:  [facility]  [address] [place] [address]   Phone [phone]       All other labs were withinnormal range or not returned as of this dictation. EMERGENCY DEPARTMENT COURSE and DIFFERENTIAL DIAGNOSIS/MDM:     PMH, Surgical Hx, FH, Social Hx reviewed by myself (ETOH usage, Tobacco usage, Drug usage reviewed by myself, no pertinent Hx)- No Pertinent Hx     Old records were reviewed by me    72 yr old with worsening diverticulitis (Was here 1 day prior and treated outpatient). CT shows diverticulitis but now has AL because cannot tolerate po. Fluids and Zosyn given. Admission for diverticulitis and AL. CRITICAL CARE TIME   Total Critical Caretime was 39 minutes, excluding separately reportable procedures. There was a high probability of clinically significant/life threatening deterioration in the patient's condition which required my urgent intervention.         PROCEDURES:  Unlessotherwise noted below, none    FINAL IMPRESSION      1. Acute diverticulitis    2.  AL (acute kidney injury) [facility])          Michelle Mcdonald Admitted 08/23/2020 03:10:26 AM    PATIENT REFERRED TO:  John Lujan MD  [address] [address] [phone]            DISCHARGE MEDICATIONS:  New Prescriptions    No medications on file          (Please note that portions ofthis note were completed with a voice recognition program.  Efforts were made to edit the dictations but occasionally words are mis-transcribed.)    Noelle Laams MD(electronically signed)  Attending Emergency Physician        Noelle Lamas MD  08/23/20 [id number]

## 2020-08-23 NOTE — PROGRESS NOTES
Dr. Tonya Saldivar with General Surgery called this RN and discussed plan of care with this RN, Dr. Tonya Saldivar to review pt's chart and place orders, pt notified concerning this

## 2020-08-24 LAB
HCT VFR BLD CALC: 38 % (ref 36–48)
HEMOGLOBIN: 12.4 G/DL (ref 12–16)
MCH RBC QN AUTO: 31.6 PG (ref 26–34)
MCHC RBC AUTO-ENTMCNC: 32.6 G/DL (ref 31–36)
MCV RBC AUTO: 97.1 FL (ref 80–100)
PDW BLD-RTO: 14.8 % (ref 12.4–15.4)
PLATELET # BLD: 157 K/UL (ref 135–450)
PMV BLD AUTO: 9.1 FL (ref 5–10.5)
RBC # BLD: 3.91 M/UL (ref 4–5.2)
WBC # BLD: 7 K/UL (ref 4–11)

## 2020-08-24 PROCEDURE — 96376 TX/PRO/DX INJ SAME DRUG ADON: CPT

## 2020-08-24 PROCEDURE — 2580000003 HC RX 258: Performed by: INTERNAL MEDICINE

## 2020-08-24 PROCEDURE — 1200000000 HC SEMI PRIVATE

## 2020-08-24 PROCEDURE — 6370000000 HC RX 637 (ALT 250 FOR IP): Performed by: FAMILY MEDICINE

## 2020-08-24 PROCEDURE — 6360000002 HC RX W HCPCS: Performed by: INTERNAL MEDICINE

## 2020-08-24 PROCEDURE — 6370000000 HC RX 637 (ALT 250 FOR IP): Performed by: INTERNAL MEDICINE

## 2020-08-24 PROCEDURE — 99232 SBSQ HOSP IP/OBS MODERATE 35: CPT | Performed by: SURGERY

## 2020-08-24 PROCEDURE — 36415 COLL VENOUS BLD VENIPUNCTURE: CPT

## 2020-08-24 PROCEDURE — 96366 THER/PROPH/DIAG IV INF ADDON: CPT

## 2020-08-24 PROCEDURE — 85027 COMPLETE CBC AUTOMATED: CPT

## 2020-08-24 PROCEDURE — APPNB30 APP NON BILLABLE TIME 0-30 MINS: Performed by: PHYSICIAN ASSISTANT

## 2020-08-24 PROCEDURE — APPSS15 APP SPLIT SHARED TIME 0-15 MINUTES: Performed by: PHYSICIAN ASSISTANT

## 2020-08-24 PROCEDURE — 2500000003 HC RX 250 WO HCPCS: Performed by: INTERNAL MEDICINE

## 2020-08-24 RX ORDER — MORPHINE SULFATE 2 MG/ML
2 INJECTION, SOLUTION INTRAMUSCULAR; INTRAVENOUS EVERY 4 HOURS PRN
Status: DISCONTINUED | OUTPATIENT
Start: 2020-08-24 | End: 2020-08-25 | Stop reason: HOSPADM

## 2020-08-24 RX ORDER — TRAMADOL HYDROCHLORIDE 50 MG/1
100 TABLET ORAL 2 TIMES DAILY PRN
Status: DISCONTINUED | OUTPATIENT
Start: 2020-08-24 | End: 2020-08-25 | Stop reason: HOSPADM

## 2020-08-24 RX ORDER — PROCHLORPERAZINE EDISYLATE 5 MG/ML
10 INJECTION INTRAMUSCULAR; INTRAVENOUS EVERY 6 HOURS PRN
Status: DISCONTINUED | OUTPATIENT
Start: 2020-08-24 | End: 2020-08-25 | Stop reason: HOSPADM

## 2020-08-24 RX ADMIN — ATORVASTATIN CALCIUM 10 MG: 10 TABLET, FILM COATED ORAL at 08:03

## 2020-08-24 RX ADMIN — GABAPENTIN 600 MG: 300 CAPSULE ORAL at 13:02

## 2020-08-24 RX ADMIN — GABAPENTIN 600 MG: 300 CAPSULE ORAL at 20:23

## 2020-08-24 RX ADMIN — CIPROFLOXACIN 400 MG: 2 INJECTION, SOLUTION INTRAVENOUS at 04:55

## 2020-08-24 RX ADMIN — Medication 3 MG: at 20:23

## 2020-08-24 RX ADMIN — SODIUM CHLORIDE: 9 INJECTION, SOLUTION INTRAVENOUS at 20:23

## 2020-08-24 RX ADMIN — ONDANSETRON 4 MG: 2 INJECTION INTRAMUSCULAR; INTRAVENOUS at 20:23

## 2020-08-24 RX ADMIN — METRONIDAZOLE 500 MG: 500 INJECTION, SOLUTION INTRAVENOUS at 04:55

## 2020-08-24 RX ADMIN — TRAMADOL HYDROCHLORIDE 100 MG: 50 TABLET, FILM COATED ORAL at 20:23

## 2020-08-24 RX ADMIN — TRAMADOL HYDROCHLORIDE 50 MG: 50 TABLET, FILM COATED ORAL at 08:32

## 2020-08-24 RX ADMIN — CIPROFLOXACIN 400 MG: 2 INJECTION, SOLUTION INTRAVENOUS at 17:12

## 2020-08-24 RX ADMIN — LEVOTHYROXINE SODIUM 25 MCG: 0.03 TABLET ORAL at 05:00

## 2020-08-24 RX ADMIN — METRONIDAZOLE 500 MG: 500 INJECTION, SOLUTION INTRAVENOUS at 20:23

## 2020-08-24 RX ADMIN — ONDANSETRON 4 MG: 2 INJECTION INTRAMUSCULAR; INTRAVENOUS at 14:13

## 2020-08-24 RX ADMIN — ONDANSETRON 4 MG: 2 INJECTION INTRAMUSCULAR; INTRAVENOUS at 08:04

## 2020-08-24 RX ADMIN — GABAPENTIN 600 MG: 300 CAPSULE ORAL at 08:03

## 2020-08-24 RX ADMIN — CITALOPRAM HYDROBROMIDE 40 MG: 20 TABLET ORAL at 20:23

## 2020-08-24 RX ADMIN — METRONIDAZOLE 500 MG: 500 INJECTION, SOLUTION INTRAVENOUS at 13:02

## 2020-08-24 ASSESSMENT — PAIN DESCRIPTION - PROGRESSION
CLINICAL_PROGRESSION: GRADUALLY IMPROVING
CLINICAL_PROGRESSION: NOT CHANGED
CLINICAL_PROGRESSION: GRADUALLY IMPROVING

## 2020-08-24 ASSESSMENT — PAIN SCALES - WONG BAKER
WONGBAKER_NUMERICALRESPONSE: 0

## 2020-08-24 ASSESSMENT — PAIN DESCRIPTION - PAIN TYPE
TYPE: ACUTE PAIN

## 2020-08-24 ASSESSMENT — PAIN DESCRIPTION - FREQUENCY
FREQUENCY: CONTINUOUS

## 2020-08-24 ASSESSMENT — PAIN DESCRIPTION - DESCRIPTORS
DESCRIPTORS: ACHING

## 2020-08-24 ASSESSMENT — PAIN DESCRIPTION - LOCATION
LOCATION: ABDOMEN
LOCATION: ABDOMEN
LOCATION: ABDOMEN;BACK

## 2020-08-24 ASSESSMENT — PAIN DESCRIPTION - ORIENTATION
ORIENTATION: LOWER
ORIENTATION: LOWER;MID
ORIENTATION: LOWER;MID

## 2020-08-24 ASSESSMENT — PAIN - FUNCTIONAL ASSESSMENT
PAIN_FUNCTIONAL_ASSESSMENT: ACTIVITIES ARE NOT PREVENTED
PAIN_FUNCTIONAL_ASSESSMENT: ACTIVITIES ARE NOT PREVENTED
PAIN_FUNCTIONAL_ASSESSMENT: PREVENTS OR INTERFERES SOME ACTIVE ACTIVITIES AND ADLS

## 2020-08-24 ASSESSMENT — PAIN SCALES - GENERAL
PAINLEVEL_OUTOF10: 8
PAINLEVEL_OUTOF10: 7
PAINLEVEL_OUTOF10: 5

## 2020-08-24 ASSESSMENT — PAIN DESCRIPTION - ONSET
ONSET: ON-GOING

## 2020-08-24 NOTE — PROGRESS NOTES
This RN assumed care. Patient resting in bed with eyes closed but messing with CPAP mask. Adjusted mask for patient and educated on importance of keeping mask on. Patient denies any further needs at this time. Will continue to monitor and assess.

## 2020-08-24 NOTE — PROGRESS NOTES
This RN assumed care. Patient is resting in bed with eyes closed at this time. No signs of distress. Call light within reach, room free from obstacles. Will continue to monitor and assess for comfort and any other needs.

## 2020-08-24 NOTE — PROGRESS NOTES
Pt AOx4, very pleasant, states her pain has been relieved by medication but still has discomfort,  Pt ambulated to restroom and urinated. Pt has no wants or needs other than a blanket and blanket was given,  VSS. Will continue to monitor.

## 2020-08-24 NOTE — PLAN OF CARE
Problem: Pain:  Goal: Pain level will decrease  Description: Pain level will decrease  Outcome: Ongoing   Pain level will decrease  Problem: Pain:  Goal: Control of acute pain  Description: Control of acute pain  Outcome: Ongoing   Patient educated on acute pain. Taught patient to use call light to ask for pain medication. PRN pain medication given for acute pain. Will continue to monitor pain per unit protocol. Problem: Pain:  Goal: Control of chronic pain  Description: Control of chronic pain  Outcome: Ongoing     Problem: Falls - Risk of:  Goal: Will remain free from falls  Description: Will remain free from falls  Outcome: Ongoing   Will remain free from falls. Fall prevention measures are in place. Call light, telephone and bedside table are within reach. Hourly rounding per unit protocols.    Problem: Falls - Risk of:  Goal: Absence of physical injury  Description: Absence of physical injury  Outcome: Ongoing

## 2020-08-24 NOTE — PROGRESS NOTES
General and Vascular Surgery                                                           Daily Progress Note                                                             Caitlyn Moody PA-C     Pt Name: Glenn Martin  Medical Record Number: [id number]  Date of Birth 1950   Today's Date: 8/24/2020    Chief Complaint: abdominal pain    ASSESSMENT/PLAN  1. Sigmoid Diverticulitis  2. No significant change with pain  3. Continue IV antibiotics  4. May need to consider colectomy in the future since this has been reoccurring   5. Leukocytosis: 11.3 yesterday  6. Monitor and control pain  7. OOB  8. Will obtain AM labs    EDUCATION  Patient educated about their illness/diagnosis, stated above, and all questions answered. We discussed the importance of nutrition, medications they are taking, and healthy lifestyle. Sunita Mosher is unchanged from yesterday, per patient. Pain is well controlled. OBJECTIVE  VITALS:  height is 5' 6\" (1.676 m) and weight is 172 lb 9.9 oz (78.3 kg). Her oral temperature is 99.1 °F (37.3 °C). Her blood pressure is 146/76 (abnormal) and her pulse is 72. Her respiration is 18 and oxygen saturation is 91%. VITALS:  BP (!) 146/76   Pulse 72   Temp 99.1 °F (37.3 °C) (Oral)   Resp 18   Ht 5' 6\" (1.676 m)   Wt 172 lb 9.9 oz (78.3 kg)   LMP  (LMP Unknown)   SpO2 91%   BMI 27.86 kg/m²   GENERAL: alert, cooperative, no distress  ABDOMEN: tenderness present- mild,  without rebound and guarding and distention present- mild  I/O last 3 completed shifts:   In: 720 [P.O.:720]  Out: 250 [Urine:250]  I/O this shift:  In: 120 [P.O.:120]  Out: -     LABS  Recent Labs     08/23/20  0132 08/23/20  0430   WBC 11.3*  --    HGB 12.8  --    HCT 36.7  --      --    *  --    K 4.0  --      --    CO2 24  --    BUN 17  --    CREATININE 1.7*  --    CALCIUM 8.1*  --    AST 14*  --    ALT 6*  --    BILITOT <0.2  --    BILIDIR <0.2  -- NITRU  --  Negative   COLORU  --  YELLOW     CBC:   Lab Results   Component Value Date    WBC 11.3 08/23/2020    RBC 3.79 08/23/2020    HGB 12.8 08/23/2020    HCT 36.7 08/23/2020    MCV 96.8 08/23/2020    MCH 33.7 08/23/2020    MCHC 34.8 08/23/2020    RDW 14.4 08/23/2020     08/23/2020    MPV 9.1 08/23/2020     CMP:    Lab Results   Component Value Date     08/23/2020    K 4.0 08/23/2020     08/23/2020    CO2 24 08/23/2020    BUN 17 08/23/2020    CREATININE 1.7 08/23/2020    GFRAA 36 08/23/2020    GFRAA 59 06/14/2013    AGRATIO 1.5 08/21/2020    LABGLOM 30 08/23/2020    GLUCOSE 94 08/23/2020    PROT 5.5 08/23/2020    PROT 6.5 02/10/2012    LABALBU 2.9 08/23/2020    CALCIUM 8.1 08/23/2020    BILITOT <0.2 08/23/2020    ALKPHOS 70 08/23/2020    AST 14 08/23/2020    ALT 6 08/23/2020         Sean Sheth PA-C  Electronically signed 8/24/2020 at 12:40 PM    Attending    As per note above by Rafaela Rainey  Patient was personally seen and examined by me today  Chart, labs and imaging reviewed    Sigmoid diverticulitis   Improving on IV antibiotics   If she continues to improve will start PO antibiotics in AM and discharge    Electronically signed by Wenceslao Goff MD on 8/24/2020 at 3:41 PM

## 2020-08-24 NOTE — PROGRESS NOTES
Patient is A&O. Patient is resting in bed, awake and quiet. Room air. Side rails are up x2. Fall precautions are in place. Bed alarm on. Bed is in lowest position. Call light, telephone and bedside table are within reach. VSS taken. AM meds given. Shift assessment completed. Will continue to monitor patient per unit protocols.  Electronically signed by Sharon Chao RN on 8/24/2020 at 10:53 AM

## 2020-08-24 NOTE — PLAN OF CARE
Problem: Nutrition  Goal: Optimal nutrition therapy  Outcome: Ongoing     Nutrition Problem #1: Limited adherence to nutrition-related recommendations  Intervention: Food and/or Nutrient Delivery: Continue Current Diet, Start Oral Nutrition Supplement  Nutritional Goals: Consume >50% of meals and supplements

## 2020-08-24 NOTE — PROGRESS NOTES
Patient is resting in bed, awake and quiet. Room air. No signs of distress or episodes of n/v at present. Call light, telephone and bedside table are within reach. Patient denies needs at present. Will continue to monitor patient per unit protocols.  Electronically signed by Ny Patel RN on 8/24/2020 at 5:46 PM

## 2020-08-24 NOTE — PROGRESS NOTES
Comprehensive Nutrition Assessment    Type and Reason for Visit:  Initial, Positive Nutrition Screen    Nutrition Recommendations/Plan:     Continue clear liquid diet with advancement per MD  Trial Ensure Clear ONS    Nutrition Assessment:  Pt is a positive nutrition screen for 2-13 lb wt loss; poor appetite/intake. Per EMR, pt had 2 lb wt loss PTA x 2 mo. Noted in EMR that pt was not able to tolerate PO intake when first came in. Currently eating 51-75% of clear liquid diet. Will trial Ensure Clear to help with nutritional status. Pt here with acute diverticulitis and AL. Pt known to have had several episodes of diverticulitis in the past. Will add low fiber diet ed to discharge instructions along with high fiber diet ed for once inflammation has resolved and MD clears pt to advance diet. PMHx also includes PE, SVT, fibromyalgia, anxiety, CKD stage 3, syncope and collapse. Malnutrition Assessment:  Malnutrition Status: At risk for malnutrition (Comment)      Estimated Daily Nutrient Needs:  Energy (kcal):  5818-4812 kcal (20-25 kcal/kg); Weight Used for Energy Requirements:  Admission     Protein (g):  65-81g (0.8-1.0 g/kg) - adjusted for CKD stage 3; Weight Used for Protein Requirements:  Admission        Fluid (ml/day):  per MD    Nutrition Related Findings:  BM 8/24; no edema; labs from 8/23: Na low at 135, creatinine elevated at 1.7      Wounds:  None       Current Nutrition Therapies:    DIET CLEAR LIQUID; Anthropometric Measures:  · Height: 5' 6\" (167.6 cm)  · Current Body Weight: 172 lb (78 kg)   · Admission Body Weight: 178 lb (80.7 kg)(actual)    · Usual Body Weight: 180 lb (81.6 kg)(per EMR)     · Ideal Body Weight: 130 lbs; % Ideal Body Weight 132.3 %   · BMI: 27.8  · Adjusted Body Weight:  ; No Adjustment   · BMI Categories: Overweight (BMI 25.0-29. 9)       Nutrition Diagnosis:   · Limited adherence to nutrition-related recommendations related to altered GI structure as evidenced by poor intake prior to admission, weight loss, GI abnormality      Nutrition Interventions:   Food and/or Nutrient Delivery:  Continue Current Diet, Start Oral Nutrition Supplement  Nutrition Education/Counseling:  Education initiated   Coordination of Nutrition Care:  Continued Inpatient Monitoring    Goals:  Consume >50% of meals and supplements       Nutrition Monitoring and Evaluation:   Food/Nutrient Intake Outcomes:  Diet Advancement/Tolerance, Food and Nutrient Intake, Supplement Intake  Physical Signs/Symptoms Outcomes:  Biochemical Data, Weight, GI Status, Constipation, Diarrhea, Nausea or Vomiting, Meal Time Behavior     Discharge Planning:     Too soon to determine     Electronically signed by Mesha Oconnor RD, LD on 8/24/20 at 9:58 AM EDT    Contact: [phone]

## 2020-08-25 VITALS
DIASTOLIC BLOOD PRESSURE: 66 MMHG | HEIGHT: 66 IN | RESPIRATION RATE: 14 BRPM | BODY MASS INDEX: 28.38 KG/M2 | TEMPERATURE: 97.9 F | WEIGHT: 176.59 LBS | HEART RATE: 68 BPM | SYSTOLIC BLOOD PRESSURE: 148 MMHG | OXYGEN SATURATION: 96 %

## 2020-08-25 LAB
ANION GAP SERPL CALCULATED.3IONS-SCNC: 7 MMOL/L (ref 3–16)
BUN BLDV-MCNC: 8 MG/DL (ref 7–20)
CALCIUM SERPL-MCNC: 8.1 MG/DL (ref 8.3–10.6)
CHLORIDE BLD-SCNC: 108 MMOL/L (ref 99–110)
CO2: 23 MMOL/L (ref 21–32)
CREAT SERPL-MCNC: 1 MG/DL (ref 0.6–1.2)
GFR AFRICAN AMERICAN: >60
GFR NON-AFRICAN AMERICAN: 55
GLUCOSE BLD-MCNC: 87 MG/DL (ref 70–99)
HCT VFR BLD CALC: 34.7 % (ref 36–48)
HEMOGLOBIN: 11.4 G/DL (ref 12–16)
MCH RBC QN AUTO: 31.5 PG (ref 26–34)
MCHC RBC AUTO-ENTMCNC: 33 G/DL (ref 31–36)
MCV RBC AUTO: 95.5 FL (ref 80–100)
PDW BLD-RTO: 14.4 % (ref 12.4–15.4)
PLATELET # BLD: 176 K/UL (ref 135–450)
PMV BLD AUTO: 8.2 FL (ref 5–10.5)
POTASSIUM REFLEX MAGNESIUM: 4.4 MMOL/L (ref 3.5–5.1)
RBC # BLD: 3.63 M/UL (ref 4–5.2)
SODIUM BLD-SCNC: 138 MMOL/L (ref 136–145)
WBC # BLD: 6.3 K/UL (ref 4–11)

## 2020-08-25 PROCEDURE — 2500000003 HC RX 250 WO HCPCS: Performed by: INTERNAL MEDICINE

## 2020-08-25 PROCEDURE — 85027 COMPLETE CBC AUTOMATED: CPT

## 2020-08-25 PROCEDURE — 96376 TX/PRO/DX INJ SAME DRUG ADON: CPT

## 2020-08-25 PROCEDURE — 96366 THER/PROPH/DIAG IV INF ADDON: CPT

## 2020-08-25 PROCEDURE — 6360000002 HC RX W HCPCS: Performed by: FAMILY MEDICINE

## 2020-08-25 PROCEDURE — 96375 TX/PRO/DX INJ NEW DRUG ADDON: CPT

## 2020-08-25 PROCEDURE — 80048 BASIC METABOLIC PNL TOTAL CA: CPT

## 2020-08-25 PROCEDURE — 6370000000 HC RX 637 (ALT 250 FOR IP): Performed by: INTERNAL MEDICINE

## 2020-08-25 PROCEDURE — 36415 COLL VENOUS BLD VENIPUNCTURE: CPT

## 2020-08-25 PROCEDURE — 6360000002 HC RX W HCPCS: Performed by: INTERNAL MEDICINE

## 2020-08-25 RX ORDER — AMOXICILLIN AND CLAVULANATE POTASSIUM 875; 125 MG/1; MG/1
1 TABLET, FILM COATED ORAL 2 TIMES DAILY
Qty: 14 TABLET | Refills: 0 | Status: SHIPPED | OUTPATIENT
Start: 2020-08-25 | End: 2020-09-01

## 2020-08-25 RX ORDER — METRONIDAZOLE 500 MG/1
500 TABLET ORAL 2 TIMES DAILY
Qty: 20 TABLET | Refills: 0 | Status: SHIPPED | OUTPATIENT
Start: 2020-08-25 | End: 2020-08-25 | Stop reason: HOSPADM

## 2020-08-25 RX ORDER — CIPROFLOXACIN 500 MG/1
500 TABLET, FILM COATED ORAL 2 TIMES DAILY
Qty: 20 TABLET | Refills: 0 | Status: SHIPPED | OUTPATIENT
Start: 2020-08-25 | End: 2020-08-25 | Stop reason: HOSPADM

## 2020-08-25 RX ADMIN — METRONIDAZOLE 500 MG: 500 INJECTION, SOLUTION INTRAVENOUS at 05:45

## 2020-08-25 RX ADMIN — LEVOTHYROXINE SODIUM 25 MCG: 0.03 TABLET ORAL at 05:44

## 2020-08-25 RX ADMIN — ATORVASTATIN CALCIUM 10 MG: 10 TABLET, FILM COATED ORAL at 08:29

## 2020-08-25 RX ADMIN — ONDANSETRON 4 MG: 2 INJECTION INTRAMUSCULAR; INTRAVENOUS at 10:47

## 2020-08-25 RX ADMIN — GABAPENTIN 600 MG: 300 CAPSULE ORAL at 08:29

## 2020-08-25 RX ADMIN — MORPHINE SULFATE 2 MG: 2 INJECTION, SOLUTION INTRAMUSCULAR; INTRAVENOUS at 05:44

## 2020-08-25 RX ADMIN — PROCHLORPERAZINE EDISYLATE 10 MG: 5 INJECTION INTRAMUSCULAR; INTRAVENOUS at 05:44

## 2020-08-25 ASSESSMENT — PAIN SCALES - GENERAL: PAINLEVEL_OUTOF10: 7

## 2020-08-25 NOTE — PROGRESS NOTES
Checking on patient Q2H for nutrition needs, hygiene needs, comfort measures, mobility, fall risk interventions, and safe environment. All precautions and interventions in place. Educated patient on use of call light and telephone. Patient verbalizes understanding. Call light/telephone in reach.   Electronically signed by Jen Solis RN on 8/25/2020 at 7:25 AM

## 2020-08-25 NOTE — PLAN OF CARE
Problem: Pain:  Goal: Pain level will decrease  Description: Pain level will decrease  8/25/2020 0724 by Kush Ma RN  Outcome: Ongoing  Note: Pain /discomfort being managed with PRN analgesics per MD orders. Patient able to express presence and absence of pain and rate pain appropriately using numerical scale. 8/25/2020 0112 by Lenin Garza RN  Outcome: Ongoing  Note: Educated patient on pain management. Will assess patients pain level per unit protocol, and provide pain management measures as needed. Goal: Control of acute pain  Description: Control of acute pain  8/25/2020 0724 by Kush Ma RN  Outcome: Ongoing  8/25/2020 0112 by Lenin Garza RN  Outcome: Ongoing  Note: Patient educated on acute pain. Taught patient to use call light to ask for pain medication. PRN pain medication given for acute pain. Will continue to monitor pain per unit protocol. Goal: Control of chronic pain  Description: Control of chronic pain  8/25/2020 0724 by Kush Ma RN  Outcome: Ongoing  8/25/2020 0112 by Lenin Garza RN  Outcome: Ongoing  Note: Patient educated on chronic pain. Taught patient to use call light to ask for pain medication. Will continue to monitor pain per unit protocol. Problem: Falls - Risk of:  Goal: Will remain free from falls  Description: Will remain free from falls  8/25/2020 0724 by Kush Ma RN  Outcome: Ongoing  Note: Fall risk assessment completed . Fall precautions in place, bed alarm on, side rails 2/4 up, call light in reach, educated pt on calling for assistance when needed, room clear of clutter. Pt verbalized understanding. 8/25/2020 0112 by Lenin Garza RN  Outcome: Ongoing  Note: Patient educated on fall prevention. Call light is within reach, bed locked in lowest position, personal items within reach, and bed alarm is on. Will round on patient per unit guidelines.     Goal: Absence of physical injury  Description: Absence of physical injury  8/25/2020 3657 by Luca Roa RN  Outcome: Ongoing  8/25/2020 0112 by Loc Quinonez RN  Outcome: Ongoing  Note: Pt assessed for fall risk and fall precautions put into place. Bed in lowest position and wheels locked, call light within reach. Nonskid footwear in place. Patient educated on appropriate method of transfer and to call for assistance. Problem: Nutrition  Goal: Optimal nutrition therapy  8/25/2020 0724 by Luca oRa RN  Outcome: Ongoing  8/25/2020 0112 by Loc Quinonez RN  Outcome: Ongoing  Note: Educated patient on importance of proper nutrition. Intake is being recorded to ensure optimal nutrition. Will consult dietitian as needed.

## 2020-08-25 NOTE — PROGRESS NOTES
Hospitalist Progress Note      PCP: Mauricio Maher MD    Date of Admission: 8/23/2020    Chief Complaint: Recurrent diverticulitis    Hospital Course:     Subjective: Pain improving. Wants to try regular diet      Medications:  Reviewed    Infusion Medications    sodium chloride 75 mL/hr at 08/24/20 2023     Scheduled Medications    atorvastatin  10 mg Oral Daily    levothyroxine  25 mcg Oral Daily    fluticasone  1 spray Each Nostril Daily    citalopram  40 mg Oral Daily    ciprofloxacin  400 mg Intravenous Q12H    metroNIDAZOLE  500 mg Intravenous Q8H    gabapentin  600 mg Oral TID     PRN Meds: prochlorperazine, morphine, traMADol, iopamidol, albuterol, dicyclomine, acetaminophen, ondansetron, melatonin      Intake/Output Summary (Last 24 hours) at 8/24/2020 2135  Last data filed at 8/24/2020 1821  Gross per 24 hour   Intake 720 ml   Output --   Net 720 ml       Exam:    /65   Pulse 83   Temp 98.2 °F (36.8 °C) (Axillary)   Resp 18   Ht 5' 6\" (1.676 m)   Wt 172 lb 9.9 oz (78.3 kg)   LMP  (LMP Unknown)   SpO2 97%   BMI 27.86 kg/m²     General appearance: No apparent distress, appears stated age and cooperative. HEENT: Pupils equal, round, and reactive to light. Conjunctivae/corneas clear. Neck: Supple, with full range of motion. No jugular venous distention. Trachea midline. Respiratory:  Normal respiratory effort. Clear to auscultation, bilaterally without Rales/Wheezes/Rhonchi. Cardiovascular: Regular rate and rhythm with normal S1/S2 without murmurs, rubs or gallops. Abdomen: Soft, mild tenderness without rebound or guarding, non-distended with normal bowel sounds. Musculoskeletal: No clubbing, cyanosis or edema bilaterally. Full range of motion without deformity. Skin: Skin color, texture, turgor normal.  No rashes or lesions. Neurologic:  Neurovascularly intact without any focal sensory/motor deficits.  Cranial nerves: II-XII intact, grossly non-focal.  Psychiatric: Alert and oriented, thought content appropriate, normal insight  Capillary Refill: Brisk,< 3 seconds   Peripheral Pulses: +2 palpable, equal bilaterally       Labs:   Recent Labs     08/23/20 0132 08/24/20  1528   WBC 11.3* 7.0   HGB 12.8 12.4   HCT 36.7 38.0    157     Recent Labs     08/23/20 0132   *   K 4.0      CO2 24   BUN 17   CREATININE 1.7*   CALCIUM 8.1*     Recent Labs     08/23/20 0132   AST 14*   ALT 6*   BILIDIR <0.2   BILITOT <0.2   ALKPHOS 70     No results for input(s): INR in the last 72 hours. No results for input(s): James Luray in the last 72 hours. Urinalysis:      Lab Results   Component Value Date    NITRU Negative 08/23/2020    WBCUA 17 08/21/2020    RBCUA 1 08/21/2020    BLOODU Negative 08/23/2020    SPECGRAV 1.017 08/23/2020    GLUCOSEU Negative 08/23/2020    GLUCOSEU NEGATIVE 02/10/2012       Radiology:  CT ABDOMEN PELVIS WO CONTRAST Additional Contrast? None   Final Result   Mild inflammatory changes associated the sigmoid colon. These are slightly   increased but remain mild, findings consistent with uncomplicated   diverticulitis. No perforation. No free air or abscess.                  Assessment/Plan:    Active Hospital Problems    Diagnosis Date Noted    Acute diverticulitis [K57.92]     Diverticulitis [K57.92] 08/23/2020       - abdominal pain   - diverticulitis   - cayden - as per patient she also has CKD3  Prior creat was 1.1 now 1.7  - hypovolemia   - hypothyroidism      Plan   atbx cipro and flagyl  Advancing diet per surgery  Pain control   Started IVF  Surgery following     DVT Prophylaxis: Lovenox  Diet: Dietary Nutrition Supplements: Clear Liquid Oral Supplement  DIET FULL LIQUID;  Code Status: Prior    PT/OT Eval Status: N/A    Dispo -Mich Reed MD

## 2020-08-25 NOTE — PLAN OF CARE
Problem: Pain:  Goal: Pain level will decrease  Description: Pain level will decrease  Outcome: Ongoing  Note: Educated patient on pain management. Will assess patients pain level per unit protocol, and provide pain management measures as needed. Goal: Control of acute pain  Description: Control of acute pain  Outcome: Ongoing  Note: Patient educated on acute pain. Taught patient to use call light to ask for pain medication. PRN pain medication given for acute pain. Will continue to monitor pain per unit protocol. Goal: Control of chronic pain  Description: Control of chronic pain  Outcome: Ongoing  Note: Patient educated on chronic pain. Taught patient to use call light to ask for pain medication. Will continue to monitor pain per unit protocol. Problem: Falls - Risk of:  Goal: Will remain free from falls  Description: Will remain free from falls  Outcome: Ongoing  Note: Patient educated on fall prevention. Call light is within reach, bed locked in lowest position, personal items within reach, and bed alarm is on. Will round on patient per unit guidelines. Goal: Absence of physical injury  Description: Absence of physical injury  Outcome: Ongoing  Note: Pt assessed for fall risk and fall precautions put into place. Bed in lowest position and wheels locked, call light within reach. Nonskid footwear in place. Patient educated on appropriate method of transfer and to call for assistance. Problem: Nutrition  Goal: Optimal nutrition therapy  Outcome: Ongoing  Note: Educated patient on importance of proper nutrition. Intake is being recorded to ensure optimal nutrition. Will consult dietitian as needed.

## 2020-08-26 ENCOUNTER — TELEPHONE (OUTPATIENT)
Dept: FAMILY MEDICINE CLINIC | Age: 70
End: 2020-08-26

## 2020-08-26 NOTE — TELEPHONE ENCOUNTER
[facility] Transitions Initial Follow Up Call    Outreach made within 2 business days of discharge: Yes    Patient: Fortino Rainey Patient : 1950   MRN: <[id number]>  Reason for Admission: There are no discharge diagnoses documented for the most recent discharge. Discharge Date: 20       Spoke with:  Tip    Discharge department/facility: [facility] Interactive Patient Contact:  Was patient able to fill all prescriptions: Yes  Was patient instructed to bring all medications to the follow-up visit: Yes  Is patient taking all medications as directed in the discharge summary? Yes  Does patient understand their discharge instructions: Yes  Does patient have questions or concerns that need addressed prior to 7-14 day follow up office visit: yes -     Scheduled appointment with PCP within 7-14 days  20 @ 8:15 am    Follow Up  No future appointments.     Yessy Kahn MA

## 2020-08-30 NOTE — DISCHARGE SUMMARY
Hospital Medicine Discharge Summary    Patient ID: Roscoe Spencer      Patient's PCP: Sheldon Washington MD    Admit Date: 8/23/2020     Discharge Date: 8/25/2020      Admitting Physician: Christeen Hodgkins, MD     Discharge Physician: Aieme Anaya MD     Discharge Diagnoses: Active Hospital Problems    Diagnosis Date Noted    Acute diverticulitis [K57.92]     Diverticulitis [K57.92] 08/23/2020       The patient was seen and examined on day of discharge and this discharge summary is in conjunction with any daily progress note from day of discharge. Hospital Course:     Abdominal Pain:  - due to acute diverticulitis  - surgery consulted, would possibly need elective surgery outpatient  - cipro flagyl  - diet advanced prior to D/C  - IVF    AL on CKD Stage 3:  - 1.7 on admission  - resolved back to baseline with IVF at time of discharge           Exam:     BP (!) 148/66   Pulse 68   Temp 97.9 °F (36.6 °C) (Oral)   Resp 14   Ht 5' 6\" (1.676 m)   Wt 176 lb 9.4 oz (80.1 kg)   LMP  (LMP Unknown)   SpO2 96%   BMI 28.50 kg/m²     General appearance: No apparent distress, appears stated age and cooperative. HEENT: Pupils equal, round, and reactive to light. Conjunctivae/corneas clear. Neck: Supple, with full range of motion. No jugular venous distention. Trachea midline. Respiratory:  Normal respiratory effort. Clear to auscultation, bilaterally without Rales/Wheezes/Rhonchi. Cardiovascular: Regular rate and rhythm with normal S1/S2 without murmurs, rubs or gallops. Abdomen: Soft, non-tender, non-distended with normal bowel sounds. Musculoskelatal: No clubbing, cyanosis or edema bilaterally. Full range of motion without deformity. Skin: Skin color, texture, turgor normal.  No rashes or lesions. Neurologic:  Neurovascularly intact without any focal sensory/motor deficits.  Cranial nerves: II-XII intact, grossly non-focal.  Psychiatric: Alert and oriented, thought content appropriate, normal insight      Consults:     IP CONSULT TO GENERAL SURGERY    Significant Diagnostic Studies:       Radiology:  CT ABDOMEN PELVIS WO CONTRAST Additional Contrast? None   Final Result   Mild inflammatory changes associated the sigmoid colon. These are slightly   increased but remain mild, findings consistent with uncomplicated   diverticulitis. No perforation. No free air or abscess.           PCP/SNF to follow up: Diverticulitis, chronic management    Disposition:  Home    Condition on D/C:  Stable     Discharge Instructions/Follow-up:  F/u with PCP within 1 week. May need elective colectomy for diverticulitis prevention. F/u with Surgery    Code Status:  Prior     Activity: activity as tolerated    Diet: regular diet    Labs: For convenience and continuity at follow-up the following most recent labs are provided:      CBC:    Lab Results   Component Value Date    WBC 6.3 08/25/2020    HGB 11.4 08/25/2020    HCT 34.7 08/25/2020     08/25/2020       Renal:    Lab Results   Component Value Date     08/25/2020    K 4.4 08/25/2020     08/25/2020    CO2 23 08/25/2020    BUN 8 08/25/2020    CREATININE 1.0 08/25/2020    CALCIUM 8.1 08/25/2020    PHOS 3.5 06/11/2019       Discharge Medications:     Discharge Medication List as of 8/25/2020  1:57 PM           Details   amoxicillin-clavulanate (AUGMENTIN) 875-125 MG per tablet Take 1 tablet by mouth 2 times daily for 7 days, Disp-14 tablet,R-0Normal              Details   levothyroxine (SYNTHROID) 25 MCG tablet TAKE 1 TABLET BY MOUTH DAILY, Disp-90 tablet,R-1Normal      gabapentin (NEURONTIN) 600 MG tablet Take 1 tablet by mouth 4 times daily for 360 doses. , Disp-360 tablet,R-0Normal      traMADol (ULTRAM) 50 MG tablet Take 1 tablet by mouth 2 times daily as needed for Pain for up to 90 days. , Disp-60 tablet,R-2Normal      citalopram (CELEXA) 40 MG tablet TAKE 1 TABLET BY MOUTH DAILY, Disp-90 tablet,R-1Normal      lovastatin (MEVACOR) 40 MG tablet TAKE 1 TABLET BY MOUTH EVERY DAY., Disp-90 tablet, R-1Normal      dicyclomine (BENTYL) 10 MG capsule Take 1 capsule by mouth 4 times daily as needed (abdominal cramping and diarrhea), Disp-120 capsule, R-1Normal      promethazine (PHENERGAN) 12.5 MG tablet Take 1 tablet by mouth every 8 hours as needed for Nausea TAKE 1 TABLET BY MOUTH EVERY DAY AS NEEDED, Disp-90 tablet, R-0Normal      fluticasone (FLONASE) 50 MCG/ACT nasal spray 1 spray by Each Nare route daily 1 Spray in each nostril, Disp-1 Bottle, R-0Normal      albuterol (PROVENTIL) (2.5 MG/3ML) 0.083% nebulizer solution Take 3 mLs by nebulization every 6 hours as needed for Wheezing, Disp-120 each, R-0Print      PROVENTIL  (90 BASE) MCG/ACT inhaler INHALE 2 PUFFS BY MOUTH EVERY 6 HOURS AS NEEDED FOR WHEEZING, Disp-6.7 g, R-1Normal             Time Spent on discharge is more than 20 minutes in the examination, evaluation, counseling and review of medications and discharge plan. Signed: Max Rankin MD   8/29/2020      Thank you Sindy Dang MD for the opportunity to be involved in this patient's care. If you have any questions or concerns please feel free to contact me at [phone].

## 2020-09-01 ENCOUNTER — OFFICE VISIT (OUTPATIENT)
Dept: FAMILY MEDICINE CLINIC | Age: 70
End: 2020-09-01
Payer: MEDICARE

## 2020-09-01 VITALS
SYSTOLIC BLOOD PRESSURE: 124 MMHG | HEIGHT: 66 IN | OXYGEN SATURATION: 95 % | BODY MASS INDEX: 26.97 KG/M2 | DIASTOLIC BLOOD PRESSURE: 76 MMHG | HEART RATE: 77 BPM | WEIGHT: 167.8 LBS | TEMPERATURE: 98.4 F

## 2020-09-01 PROCEDURE — G8400 PT W/DXA NO RESULTS DOC: HCPCS | Performed by: INTERNAL MEDICINE

## 2020-09-01 PROCEDURE — 1111F DSCHRG MED/CURRENT MED MERGE: CPT | Performed by: INTERNAL MEDICINE

## 2020-09-01 PROCEDURE — 4004F PT TOBACCO SCREEN RCVD TLK: CPT | Performed by: INTERNAL MEDICINE

## 2020-09-01 PROCEDURE — G8417 CALC BMI ABV UP PARAM F/U: HCPCS | Performed by: INTERNAL MEDICINE

## 2020-09-01 PROCEDURE — 1123F ACP DISCUSS/DSCN MKR DOCD: CPT | Performed by: INTERNAL MEDICINE

## 2020-09-01 PROCEDURE — 4040F PNEUMOC VAC/ADMIN/RCVD: CPT | Performed by: INTERNAL MEDICINE

## 2020-09-01 PROCEDURE — 99214 OFFICE O/P EST MOD 30 MIN: CPT | Performed by: INTERNAL MEDICINE

## 2020-09-01 PROCEDURE — 3023F SPIROM DOC REV: CPT | Performed by: INTERNAL MEDICINE

## 2020-09-01 PROCEDURE — G8427 DOCREV CUR MEDS BY ELIG CLIN: HCPCS | Performed by: INTERNAL MEDICINE

## 2020-09-01 PROCEDURE — G8926 SPIRO NO PERF OR DOC: HCPCS | Performed by: INTERNAL MEDICINE

## 2020-09-01 PROCEDURE — 3017F COLORECTAL CA SCREEN DOC REV: CPT | Performed by: INTERNAL MEDICINE

## 2020-09-01 PROCEDURE — 1090F PRES/ABSN URINE INCON ASSESS: CPT | Performed by: INTERNAL MEDICINE

## 2020-09-01 RX ORDER — ALBUTEROL SULFATE 2.5 MG/3ML
2.5 SOLUTION RESPIRATORY (INHALATION) EVERY 6 HOURS PRN
Qty: 120 EACH | Refills: 0 | Status: SHIPPED | OUTPATIENT
Start: 2020-09-01

## 2020-09-01 RX ORDER — TIZANIDINE 4 MG/1
TABLET ORAL PRN
COMMUNITY
End: 2021-03-04 | Stop reason: SINTOL

## 2020-09-01 NOTE — PROGRESS NOTES
SUBJECTIVE:  Frank Saravia is a 72 y.o. female being evaluated for:    Chief Complaint   Patient presents with    Follow-Up from [facility] 8/21 & 8/23-8/25 Acute diverticulitis       HPI   Hospitalized for diverticulitis Presented with diarrhea and lower abdominal pain Treated with piperacillin  and flagyl. cipro made her throw up    Stomach  is better  Still no appetite on a low fiber diet No dysuria  No fevers or chills     Pain in low back every now and then down her leg  Pain between her shoulder blades     Anxious and depressed  Getting better     Allergies   Allergen Reactions    Ciprofloxacin Nausea And Vomiting    Nsaids      Kidney disease    Abilify [Aripiprazole] Itching    Benadryl [Diphenhydramine] Other (See Comments)    Darvon [Propoxyphene]     Dilaudid [Hydromorphone Hcl] Itching    Hydrocodone-Acetaminophen Nausea And Vomiting     8/23/2020--pt states that only the hydrodocone-acetaminophen combination causes a reaction, but pt doesn't react to acetaminophen by Jimmy Magana, RN    Percocet [Oxycodone-Acetaminophen] Other (See Comments)     Makes her loopy    Trintellix [Vortioxetine] Other (See Comments)     Hands/feet jittery, nausea     Current Outpatient Medications   Medication Sig Dispense Refill    tiZANidine (ZANAFLEX) 4 MG tablet as needed for Pain Back pain      albuterol (PROVENTIL) (2.5 MG/3ML) 0.083% nebulizer solution Take 3 mLs by nebulization every 6 hours as needed for Wheezing 120 each 0    umeclidinium-vilanterol (ANORO ELLIPTA) 62.5-25 MCG/INH AEPB inhaler Inhale 1 puff into the lungs daily 1 each 3    levothyroxine (SYNTHROID) 25 MCG tablet TAKE 1 TABLET BY MOUTH DAILY 90 tablet 1    gabapentin (NEURONTIN) 600 MG tablet Take 1 tablet by mouth 4 times daily for 360 doses.  (Patient taking differently: Take 600 mg by mouth 3 times daily. ) 360 tablet 0    traMADol (ULTRAM) 50 MG tablet Take 1 tablet by mouth 2 times daily as needed for Pain for up to 90 days. 60 tablet 2    citalopram (CELEXA) 40 MG tablet TAKE 1 TABLET BY MOUTH DAILY 90 tablet 1    lovastatin (MEVACOR) 40 MG tablet TAKE 1 TABLET BY MOUTH EVERY DAY. 90 tablet 1    dicyclomine (BENTYL) 10 MG capsule Take 1 capsule by mouth 4 times daily as needed (abdominal cramping and diarrhea) 120 capsule 1    promethazine (PHENERGAN) 12.5 MG tablet Take 1 tablet by mouth every 8 hours as needed for Nausea TAKE 1 TABLET BY MOUTH EVERY DAY AS NEEDED 90 tablet 0    PROVENTIL  (90 BASE) MCG/ACT inhaler INHALE 2 PUFFS BY MOUTH EVERY 6 HOURS AS NEEDED FOR WHEEZING 6.7 g 1     No current facility-administered medications for this visit. Social History     Socioeconomic History    Marital status:       Spouse name: Not on file    Number of children: 2    Years of education: Not on file    Highest education level: Not on file   Occupational History    Occupation: NA   Social Needs    Financial resource strain: Not on file    Food insecurity     Worry: Not on file     Inability: Not on file   [employer] needs     Medical: Not on file     Non-medical: Not on file   Tobacco Use    Smoking status: Current Every Day Smoker     Packs/day: 0.25     Years: 45.00     Pack years: 11.25     Types: Cigarettes     Start date: 8/16/2014    Smokeless tobacco: Never Used    Tobacco comment: would like to quit   Substance and Sexual Activity    Alcohol use: No     Alcohol/week: 0.0 standard drinks    Drug use: No    Sexual activity: Never   Lifestyle    Physical activity     Days per week: Not on file     Minutes per session: Not on file    Stress: Not on file   Relationships    Social connections     Talks on phone: Not on file     Gets together: Not on file     Attends Anabaptist service: Not on file     Active member of club or organization: Not on file     Attends meetings of clubs or organizations: Not on file     Relationship status: Not on file    Intimate partner violence     Fear of current or ex partner: Not on file     Emotionally abused: Not on file     Physically abused: Not on file     Forced sexual activity: Not on file   Other Topics Concern    Not on file   Social History Narrative    Not on file      Past Medical History:   Diagnosis Date    [facility]     CRF (chronic renal failure), stage 3 (moderate) [facility]     Depression     Diverticulosis of colon     Fibromyalgia     Fibromyalgia     Hyperlipidemia     Joint ache     Kidney failure     states begining stage 4 since summer '17    Lumbar disc disease     Lumbar disc disease     Migraine     Muscle spasm of back     Pleural effusion     SVT (supraventricular tachycardia) ([facility]     Varicose vein      Past Surgical History:   Procedure Laterality Date    HERNIA REPAIR      right inguinal and hernia     HYSTERECTOMY      HYSTERECTOMY, VAGINAL      PAIN MANAGEMENT PROCEDURE Left 6/12/2020    LUMBAR EPIDURAL STEROID INJECTION LEFT L4-L5 performed by Sunshine Ledezma MD at [place] [address]         Review of Systems   Constitutional: Negative for chills and fever. Respiratory: Negative for cough and shortness of breath. Cardiovascular: Negative for chest pain, palpitations and leg swelling. Gastrointestinal: Positive for abdominal pain and diarrhea. Negative for blood in stool, nausea and vomiting. Genitourinary: Negative for dysuria. Musculoskeletal: Positive for back pain. Neurological: Negative for dizziness, light-headedness and headaches. Psychiatric/Behavioral: Positive for dysphoric mood. The patient is nervous/anxious. OBJECTIVE:  /76   Pulse 77   Temp 98.4 °F (36.9 °C) (Temporal)   Ht 5' 6\" (1.676 m)   Wt 167 lb 12.8 oz (76.1 kg)   LMP  (LMP Unknown)   SpO2 95%   Breastfeeding No   BMI 27.08 kg/m²      Body mass index is 27.08 kg/m².      Physical Exam  Vitals signs and nursing note reviewed. Constitutional:       Appearance: Normal appearance. She is well-developed. HENT:      Head: Normocephalic and atraumatic. Nose: Nose normal.      Mouth/Throat:      Pharynx: Oropharynx is clear. Eyes:      Conjunctiva/sclera: Conjunctivae normal.   Neck:      Musculoskeletal: Neck supple. Thyroid: No thyromegaly. Vascular: No carotid bruit. Cardiovascular:      Rate and Rhythm: Normal rate and regular rhythm. Heart sounds: Normal heart sounds. No murmur. No friction rub. No gallop. Pulmonary:      Effort: Pulmonary effort is normal.      Breath sounds: Normal breath sounds. Chest:      Chest wall: No tenderness. Abdominal:      General: Bowel sounds are normal. There is no distension. Palpations: Abdomen is soft. Tenderness: There is no abdominal tenderness. Comments: No hepatosplenomegaly   Musculoskeletal:         General: Tenderness (Low back and upper back ) present. Comments: Decreased extension. Fairly good flexion   Lymphadenopathy:      Cervical: No cervical adenopathy. Skin:     General: Skin is warm and dry. Neurological:      General: No focal deficit present. Mental Status: She is alert. Motor: No abnormal muscle tone. Gait: Gait normal.   Psychiatric:         Behavior: Behavior normal.      Comments: Depressed         ASSESSMENT/PLAN:    Isidro Leyva was seen today for follow-up from hospital.    Diagnoses and all orders for this visit:    Sigmoid diverticulitis  -     CBC Auto Differential; Future  -     CA DISCHARGE MEDS RECONCILED W/ CURRENT OUTPATIENT MED LIST    Karen Perera bladder polyp  -     [facility] Rolando Stanley MD, Vascular Surgery, [facility]  -     CA DISCHARGE MEDS RECONCILED W/ CURRENT OUTPATIENT MED LIST    Renal insufficiency  -     Comprehensive Metabolic Panel;  Future  -     CA DISCHARGE MEDS RECONCILED W/ CURRENT OUTPATIENT MED LIST    Chronic obstructive pulmonary disease, unspecified [facility] type [facility]  - albuterol (PROVENTIL) (2.5 MG/3ML) 0.083% nebulizer solution; Take 3 mLs by nebulization every 6 hours as needed for Wheezing  -     umeclidinium-vilanterol (ANORO ELLIPTA) 62.5-25 MCG/INH AEPB inhaler; Inhale 1 puff into the lungs daily    Acquired hypothyroidism  -     TSH without Reflex; Future    Thyroid cyst  -     TSH without Reflex; Future  -     US THYROID; Future    Hypercholesteremia  -     Lipid Panel; Future    Chronic midline low back pain without sciatica    Moderate episode of recurrent major depressive disorder (HCC)    SVT (supraventricular tachycardia) (HCC)  -     Comprehensive Metabolic Panel; Future    Family history of MTHFR deficiency  -     HOMOCYSTEINE, SERUM; Future    Other specified metabolic disorders (HCC)   -     HOMOCYSTEINE, SERUM; Future        Orders Placed This Encounter   Medications    albuterol (PROVENTIL) (2.5 MG/3ML) 0.083% nebulizer solution     Sig: Take 3 mLs by nebulization every 6 hours as needed for Wheezing     Dispense:  120 each     Refill:  0    umeclidinium-vilanterol (ANORO ELLIPTA) 62.5-25 MCG/INH AEPB inhaler     Sig: Inhale 1 puff into the lungs daily     Dispense:  1 each     Refill:  3        Return in about 2 months (around 11/1/2020), or if symptoms worsen or fail to improve. There are no Patient Instructions on file for this visit.

## 2020-09-07 ASSESSMENT — ENCOUNTER SYMPTOMS
BACK PAIN: 1
DIARRHEA: 1
NAUSEA: 0
COUGH: 0
SHORTNESS OF BREATH: 0
ABDOMINAL PAIN: 1
VOMITING: 0
BLOOD IN STOOL: 0

## 2020-09-28 ENCOUNTER — TELEPHONE (OUTPATIENT)
Dept: FAMILY MEDICINE CLINIC | Age: 70
End: 2020-09-28

## 2020-09-28 RX ORDER — TRAMADOL HYDROCHLORIDE 50 MG/1
50 TABLET ORAL 2 TIMES DAILY PRN
Qty: 60 TABLET | Refills: 2 | Status: SHIPPED | OUTPATIENT
Start: 2020-09-28 | End: 2020-12-21

## 2020-10-06 ENCOUNTER — OFFICE VISIT (OUTPATIENT)
Dept: SURGERY | Age: 70
End: 2020-10-06
Payer: MEDICARE

## 2020-10-06 PROCEDURE — 99213 OFFICE O/P EST LOW 20 MIN: CPT | Performed by: SURGERY

## 2020-10-06 ASSESSMENT — ENCOUNTER SYMPTOMS: ABDOMINAL PAIN: 1

## 2020-10-06 NOTE — PROGRESS NOTES
Subjective:      Patient ID: Jayy Tolentino is a 72 y.o. female. HPI  Chief Complaint: abdominal pain    Patient was recently in the hospital for evaluation of recurring diverticulitis. Patient reports symptoms of LLQ pain on at least ten occasions. Symptoms were first noted more than ten years ago. Previous evaluation includes CT with diverticulitis and possible gallbladder polyp. Patient has a history of extensive ventral hernia repair with mesh which covers the entire midline and RLQ. Will plan following treatment: colonoscopy. Will plan sigmoid resection and cholecystectomy. Will be complicated by large mesh prosthesis. Past Medical History:   Diagnosis Date    [facility]) [facility]     CRF (chronic renal failure), stage 3 (moderate)     Depression     Diverticulosis of colon     Fibromyalgia     Fibromyalgia     Hyperlipidemia     Joint ache     Kidney failure     states begining stage 4 since summer '17    Lumbar disc disease     Lumbar disc disease     Migraine     Muscle spasm of back     Pleural effusion     SVT [facility]supraventricular tachycardia) ([facility]     Varicose vein        Past Surgical History:   Procedure Laterality Date    HERNIA REPAIR      right inguinal and hernia     HYSTERECTOMY      HYSTERECTOMY, VAGINAL      PAIN MANAGEMENT PROCEDURE Left 6/12/2020    LUMBAR EPIDURAL STEROID INJECTION LEFT L4-L5 performed by Saba Gray MD at [place] [address]         Current Outpatient Medications   Medication Sig Dispense Refill    traMADol (ULTRAM) 50 MG tablet Take 1 tablet by mouth 2 times daily as needed for Pain for up to 90 days.  60 tablet 2    tiZANidine (ZANAFLEX) 4 MG tablet as needed for Pain Back pain      albuterol (PROVENTIL) (2.5 MG/3ML) 0.083% nebulizer solution Take 3 mLs by nebulization every 6 hours as needed for Wheezing 120 each 0    umeclidinium-vilanterol (ANORO ELLIPTA) 62.5-25 MCG/INH AEPB inhaler Inhale 1 puff into the lungs daily 1 each 3    levothyroxine (SYNTHROID) 25 MCG tablet TAKE 1 TABLET BY MOUTH DAILY 90 tablet 1    gabapentin (NEURONTIN) 600 MG tablet Take 1 tablet by mouth 4 times daily for 360 doses. (Patient taking differently: Take 600 mg by mouth 3 times daily. ) 360 tablet 0    citalopram (CELEXA) 40 MG tablet TAKE 1 TABLET BY MOUTH DAILY 90 tablet 1    lovastatin (MEVACOR) 40 MG tablet TAKE 1 TABLET BY MOUTH EVERY DAY. 90 tablet 1    dicyclomine (BENTYL) 10 MG capsule Take 1 capsule by mouth 4 times daily as needed (abdominal cramping and diarrhea) 120 capsule 1    promethazine (PHENERGAN) 12.5 MG tablet Take 1 tablet by mouth every 8 hours as needed for Nausea TAKE 1 TABLET BY MOUTH EVERY DAY AS NEEDED 90 tablet 0    PROVENTIL  (90 BASE) MCG/ACT inhaler INHALE 2 PUFFS BY MOUTH EVERY 6 HOURS AS NEEDED FOR WHEEZING 6.7 g 1     No current facility-administered medications for this visit. Prior to Admission medications    Medication Sig Start Date End Date Taking? Authorizing Provider   traMADol (ULTRAM) 50 MG tablet Take 1 tablet by mouth 2 times daily as needed for Pain for up to 90 days. 9/28/20 12/27/20 Yes John Shelton MD   tiZANidine (ZANAFLEX) 4 MG tablet as needed for Pain Back pain   Yes Historical Provider, MD   albuterol (PROVENTIL) (2.5 MG/3ML) 0.083% nebulizer solution Take 3 mLs by nebulization every 6 hours as needed for Wheezing 9/1/20  Yes John Shelton MD   umeclidinium-vilanterol [facility] ELLIPTA) 62.5-25 MCG/INH AEPB inhaler Inhale 1 puff into the lungs daily 9/1/20  Yes John Shelton MD   levothyroxine (SYNTHROID) 25 MCG tablet TAKE 1 TABLET BY MOUTH DAILY 8/10/20  Yes John Shelton MD   gabapentin (NEURONTIN) 600 MG tablet Take 1 tablet by mouth 4 times daily for 360 doses. Patient taking differently: Take 600 mg by mouth 3 times daily.   8/10/20 11/8/20 Yes John Shelton MD   citalopram (CELEXA) 40 MG tablet TAKE 1 TABLET BY MOUTH DAILY 6/17/20  Yes Justo Harmon MD   lovastatin (MEVACOR) 40 MG tablet TAKE 1 TABLET BY MOUTH EVERY DAY. 4/20/20  Yes Justo Harmon MD   dicyclomine (BENTYL) 10 MG capsule Take 1 capsule by mouth 4 times daily as needed (abdominal cramping and diarrhea) 12/5/19  Yes Justo Harmon MD   promethazine (PHENERGAN) 12.5 MG tablet Take 1 tablet by mouth every 8 hours as needed for Nausea TAKE 1 TABLET BY MOUTH EVERY DAY AS NEEDED 12/5/19  Yes Justo Harmon MD   PROVENTIL  (90 BASE) MCG/ACT inhaler INHALE 2 PUFFS BY MOUTH EVERY 6 HOURS AS NEEDED FOR WHEEZING 12/5/16  Yes Choco Swain MD         Allergies   Allergen Reactions    Ciprofloxacin Nausea And Vomiting    Nsaids      Kidney disease    Abilify [Aripiprazole] Itching    Benadryl [Diphenhydramine] Other (See Comments)    Darvon [Propoxyphene]     Dilaudid [Hydromorphone Hcl] Itching    Hydrocodone-Acetaminophen Nausea And Vomiting     8/23/2020--pt states that only the hydrodocone-acetaminophen combination causes a reaction, but pt doesn't react to acetaminophen by Greta Barber RN    Percocet [Oxycodone-Acetaminophen] Other (See Comments)     Makes her loopy    Trintellix [Vortioxetine] Other (See Comments)     Hands/feet jittery, nausea       Social History     Socioeconomic History    Marital status:       Spouse name: Not on file    Number of children: 2    Years of education: Not on file    Highest education level: Not on file   Occupational History    Occupation: NA   Social Needs    Financial resource strain: Not on file    Food insecurity     Worry: Not on file     Inability: Not on file   [employer] needs     Medical: Not on file     Non-medical: Not on file   Tobacco Use    Smoking status: Current Every Day Smoker     Packs/day: 0.25     Years: 45.00     Pack years: 11.25     Types: Cigarettes     Start date: 8/16/2014    Smokeless tobacco: Never Used    Tobacco comment: would like to quit Substance and Sexual Activity    Alcohol use: No     Alcohol/week: 0.0 standard drinks    Drug use: No    Sexual activity: Never   Lifestyle    Physical activity     Days per week: Not on file     Minutes per session: Not on file    Stress: Not on file   Relationships    Social connections     Talks on phone: Not on file     Gets together: Not on file     Attends [employer] service: Not on file     Active member of club or organization: Not on file     Attends meetings of clubs or organizations: Not on file     Relationship status: Not on file    Intimate partner violence     Fear of current or ex partner: Not on file     Emotionally abused: Not on file     Physically abused: Not on file     Forced sexual activity: Not on file   Other Topics Concern    Not on file   Social History Narrative    Not on file       Family History   Problem Relation Age of Onset    Hypertension Mother     Heart Failure Mother     Cancer Mother         ovarian     Stroke Father     Asthma Father     Heart Failure Brother     Cancer Brother         lung    Diabetes Brother     Heart Disease Brother     Kidney Disease Brother     Other Brother         liver disease       Review of Systems   Gastrointestinal: Positive for abdominal pain. All other systems reviewed and are negative. Objective:   Physical Exam  Constitutional:       Appearance: Normal appearance. Abdominal:      General: There is no distension. Tenderness: There is no abdominal tenderness. Neurological:      Mental Status: She is alert. Psychiatric:         Mood and Affect: Mood normal.         Thought Content: Thought content normal.         Assessment:       Diagnosis Orders   1. Diverticulitis  JOEL - Murali Campos MD, Gastroenterology, [address]:      Sigmoid colectomy  Cholecystectomy    The risks, benefits and alternatives to the planned procedure were discussed.  Patient expressed an understanding and

## 2020-10-08 ENCOUNTER — TELEPHONE (OUTPATIENT)
Dept: SURGERY | Age: 70
End: 2020-10-08

## 2020-10-08 NOTE — TELEPHONE ENCOUNTER
Patient's Daughter called stating that her mother tried to contact Dr Gerardo Barillas office to schedule her Colonoscopy but was having a difficult time dealing with their staff. I let her know that there is a referral entered into the computer system from Dr Jaja Adrian to Dr Macario Martin for her. I also faxed a copy of this referral to Dr Connee Mortimer office for them.

## 2020-10-13 ENCOUNTER — TELEPHONE (OUTPATIENT)
Dept: SURGERY | Age: 70
End: 2020-10-13

## 2020-10-13 NOTE — TELEPHONE ENCOUNTER
PT has her colonoscopy scheduled for Mon 11/9 @ 9:00 a.m.   She would like to have her surgery scheduled

## 2020-10-13 NOTE — LETTER
Surgery Scheduling Form:      DEMOGRAPHICS:                                                                                                         .    Patient Name:  Jessica Barber  Patient :  1950   Patient SS#:      Patient Phone:  [phone] (home)  Alt. Patient Phone:                     Patient Address:  [address] [place] [address]    PCP:  Radha Mcmanus MD  Insurance:  Payor: Shant Favor / Plan: [place] / Product Type: *No Product type* / Insurance ID Number:    Payor/Plan Subscr  Sex Relation Sub. Ins. ID Effective Group Num   1.  Castro JENNINGS 1950 Female  [id number]                                   [address] [address]       DIAGNOSIS & PROCEDURE:                                                                                       .    Diagnosis:   K57.92 - Diverticulitis  Operation:  Cholecystectomy and Sigmoid Colectomy   Location:  [facility]  Surgeon: Fe Meredith MD    [facility] INFORMATION:                                                                                    .    Surgeon's Scheduling Instruction:  elective  Requested Date: 20   OR Time: 12:00          Patient Arrival Time: 10:30  OR Time Required:  120  Minutes  Anesthesia:  General  Equipment:                                                           SA Required:  Yes  Status:  Same day admit         Standard C-Arm:  Yes  PAT Required:  Rapid Covid day of                           Best Time to Call:  any  Patient Requested to see PCP for Pre-op H & P:  Dr Micky Gongora will do H & P  Special Comments:   PT NOTIFIED Marine Nichole MD     46:79  PRE-CERTIFICATION INFORMATION:                                                                           .    Procedure:       CPT Code Modifier          33818          [id number]

## 2020-10-16 ENCOUNTER — TELEPHONE (OUTPATIENT)
Dept: SURGERY | Age: 70
End: 2020-10-16

## 2020-10-16 NOTE — TELEPHONE ENCOUNTER
Patient has been in the bathroom all day. She reports eating a reccee cup today and has been sick ever since. She took Nausea RXand tramadol, but is still hurting. she reports she almost passed out. Would like to know plan since surgery is not until 11/11. Will send page to Dr. Hussein Wright.      [address]

## 2020-10-19 ENCOUNTER — TELEPHONE (OUTPATIENT)
Dept: SURGERY | Age: 70
End: 2020-10-19

## 2020-10-23 RX ORDER — LOVASTATIN 40 MG/1
TABLET ORAL
Qty: 90 TABLET | Refills: 0 | Status: SHIPPED | OUTPATIENT
Start: 2020-10-23 | End: 2021-01-13

## 2020-11-03 PROBLEM — R55 SYNCOPE AND COLLAPSE: Status: RESOLVED | Noted: 2017-04-05 | Resolved: 2020-11-03

## 2020-11-09 ENCOUNTER — TELEPHONE (OUTPATIENT)
Dept: SURGERY | Age: 70
End: 2020-11-09

## 2020-11-09 NOTE — PROGRESS NOTES
ATTEMPTED TO CALL PATIENT FOR PHONE INTERVIEW VOICE MAIL NOT SET UP UNABLE TO LEAVE MESSAGE ON 11/9/2020 @ [phone]

## 2020-11-10 ENCOUNTER — ANESTHESIA EVENT (OUTPATIENT)
Dept: OPERATING ROOM | Age: 70
DRG: 415 | End: 2020-11-10
Payer: MEDICARE

## 2020-11-10 RX ORDER — GABAPENTIN 600 MG/1
600 TABLET ORAL 3 TIMES DAILY
Qty: 270 TABLET | Refills: 0 | Status: SHIPPED | OUTPATIENT
Start: 2020-11-10 | End: 2021-01-21

## 2020-11-10 RX ORDER — CITALOPRAM 40 MG/1
TABLET ORAL
Qty: 90 TABLET | Refills: 1 | Status: SHIPPED | OUTPATIENT
Start: 2020-11-10 | End: 2021-04-21

## 2020-11-10 RX ORDER — ACETAMINOPHEN 500 MG
500 TABLET ORAL EVERY 6 HOURS PRN
COMMUNITY

## 2020-11-10 NOTE — PROGRESS NOTES
Preoperative Screening for Elective Surgery/Invasive Procedures While COVID-19 present in the community     Have you tested positive or have been told to self-isolate for COVID-19 like symptoms within the past 28 days? NO   Do you currently have any of the following symptoms? NO  o Fever >100.0 F or 99.9 F in immunocompromised patients? NO  o New onset cough, shortness of breath or difficulty breathing? NO  o New onset sore throat, myalgia (muscle aches and pains), headache, loss of taste/smell or diarrhea? NO   Have you had a potential exposure to COVID-19 within the past 14 days by:  o Close contact with a confirmed case? NO  o Close contact with a healthcare worker,  or essential infrastructure worker (grocery store, [employer], gas station, public utilities or transportation)? YES-COLONOSCOPY  o Do you reside in a congregate setting such as; skilled nursing facility, adult home, correctional facility, homeless shelter or other institutional setting? NO  o Have you had recent travel to a known COVID-19 hotspot? NO    Indicate if the patient has a positive screen by answering yes to one or more of the above questions. Patients who test positive or screen positive prior to surgery or on the day of surgery should be evaluated in conjunction with the surgeon/proceduralist/anesthesiologist to determine the urgency of the procedure.

## 2020-11-10 NOTE — PROGRESS NOTES
INTERVIEW DONE WITH DAUGHTER KINGA ON HIPPA -STATES PATIENT NOT FEELING WELL AFTER HAVING COLONOSCOPY YESTERDAY

## 2020-11-10 NOTE — PROGRESS NOTES
your fingers or toes      For your safety, please do not wear any jewelry or body piercing's on the day of surgery. All jewelry must be removed. If you have dentures, they will be removed before going to operating room. For your convenience, we will provide you with a container. If you wear contact lenses or glasses, they will be removed, please bring a case for them. If you have a living will and a durable power of  for healthcare, please bring in a copy. As part of our patient safety program to minimize surgical site infections, we ask you to do the following:    · Please notify your surgeon if you develop any illness between         now and the  day of your surgery. · This includes a cough, cold, fever, sore throat, nausea,         or vomiting, and diarrhea, etc.  ·  Please notify your surgeon if you experience dizziness, shortness         of breath or blurred vision between now and the time of your surgery. Do not shave your operative site 96 hours prior to surgery. For face and neck surgery, men may use an electric razor 48 hours   prior to surgery. You may shower the night before surgery or the morning of   your surgery with an antibacterial soap. You will need to bring a photo ID and insurance card    [facility] has an onsite pharmacy, would you like to utilize our pharmacy     If you will be staying overnight and use a C-pap machine, please bring   your C-pap to hospital     Our goal is to provide you with excellent care, therefore, visitors will be limited to two(2) in the room at a time so that we may focus on providing this care for you. Please contact pre-admission testing if you have any further questions. [facility] phone number:  [phone] PAT fax number:  [phone]  Please note these are generalized instructions for all surgical cases, you may be provided with more specific instructions according to your surgery.

## 2020-11-11 ENCOUNTER — ANESTHESIA (OUTPATIENT)
Dept: OPERATING ROOM | Age: 70
DRG: 415 | End: 2020-11-11
Payer: MEDICARE

## 2020-11-11 ENCOUNTER — HOSPITAL ENCOUNTER (INPATIENT)
Age: 70
LOS: 3 days | Discharge: HOME | DRG: 415 | End: 2020-11-14
Attending: SURGERY | Admitting: SURGERY
Payer: MEDICARE

## 2020-11-11 VITALS
OXYGEN SATURATION: 86 % | RESPIRATION RATE: 1 BRPM | TEMPERATURE: 97.7 F | SYSTOLIC BLOOD PRESSURE: 126 MMHG | DIASTOLIC BLOOD PRESSURE: 60 MMHG

## 2020-11-11 PROBLEM — K57.32 SIGMOID DIVERTICULITIS: Status: ACTIVE | Noted: 2020-11-11

## 2020-11-11 LAB
ABO/RH: NORMAL
ANTIBODY SCREEN: NORMAL
SARS-COV-2, NAAT: NOT DETECTED

## 2020-11-11 PROCEDURE — 6370000000 HC RX 637 (ALT 250 FOR IP): Performed by: SURGERY

## 2020-11-11 PROCEDURE — 2580000003 HC RX 258: Performed by: SURGERY

## 2020-11-11 PROCEDURE — 2580000003 HC RX 258: Performed by: ANESTHESIOLOGY

## 2020-11-11 PROCEDURE — 1200000000 HC SEMI PRIVATE

## 2020-11-11 PROCEDURE — 2500000003 HC RX 250 WO HCPCS: Performed by: NURSE ANESTHETIST, CERTIFIED REGISTERED

## 2020-11-11 PROCEDURE — 3700000000 HC ANESTHESIA ATTENDED CARE: Performed by: SURGERY

## 2020-11-11 PROCEDURE — 0DTN0ZZ RESECTION OF SIGMOID COLON, OPEN APPROACH: ICD-10-PCS | Performed by: SURGERY

## 2020-11-11 PROCEDURE — 0FT40ZZ RESECTION OF GALLBLADDER, OPEN APPROACH: ICD-10-PCS | Performed by: SURGERY

## 2020-11-11 PROCEDURE — 6360000002 HC RX W HCPCS: Performed by: SURGERY

## 2020-11-11 PROCEDURE — 88307 TISSUE EXAM BY PATHOLOGIST: CPT

## 2020-11-11 PROCEDURE — P9045 ALBUMIN (HUMAN), 5%, 250 ML: HCPCS | Performed by: NURSE ANESTHETIST, CERTIFIED REGISTERED

## 2020-11-11 PROCEDURE — 6360000002 HC RX W HCPCS: Performed by: NURSE ANESTHETIST, CERTIFIED REGISTERED

## 2020-11-11 PROCEDURE — U0002 COVID-19 LAB TEST NON-CDC: HCPCS

## 2020-11-11 PROCEDURE — 2500000003 HC RX 250 WO HCPCS: Performed by: SURGERY

## 2020-11-11 PROCEDURE — 6360000002 HC RX W HCPCS: Performed by: ANESTHESIOLOGY

## 2020-11-11 PROCEDURE — 6370000000 HC RX 637 (ALT 250 FOR IP): Performed by: ANESTHESIOLOGY

## 2020-11-11 PROCEDURE — 86850 RBC ANTIBODY SCREEN: CPT

## 2020-11-11 PROCEDURE — 44145 PARTIAL REMOVAL OF COLON: CPT | Performed by: SURGERY

## 2020-11-11 PROCEDURE — 86900 BLOOD TYPING SEROLOGIC ABO: CPT

## 2020-11-11 PROCEDURE — 3600000004 HC SURGERY LEVEL 4 BASE: Performed by: SURGERY

## 2020-11-11 PROCEDURE — 3600000014 HC SURGERY LEVEL 4 ADDTL 15MIN: Performed by: SURGERY

## 2020-11-11 PROCEDURE — 7100000001 HC PACU RECOVERY - ADDTL 15 MIN: Performed by: SURGERY

## 2020-11-11 PROCEDURE — 2709999900 HC NON-CHARGEABLE SUPPLY: Performed by: SURGERY

## 2020-11-11 PROCEDURE — 47600 CHOLECYSTECTOMY: CPT | Performed by: SURGERY

## 2020-11-11 PROCEDURE — 3700000001 HC ADD 15 MINUTES (ANESTHESIA): Performed by: SURGERY

## 2020-11-11 PROCEDURE — 2720000010 HC SURG SUPPLY STERILE: Performed by: SURGERY

## 2020-11-11 PROCEDURE — 88304 TISSUE EXAM BY PATHOLOGIST: CPT

## 2020-11-11 PROCEDURE — 86901 BLOOD TYPING SEROLOGIC RH(D): CPT

## 2020-11-11 PROCEDURE — 7100000000 HC PACU RECOVERY - FIRST 15 MIN: Performed by: SURGERY

## 2020-11-11 RX ORDER — POLYETHYLENE GLYCOL 3350 17 G/17G
17 POWDER, FOR SOLUTION ORAL DAILY PRN
Status: DISCONTINUED | OUTPATIENT
Start: 2020-11-11 | End: 2020-11-14 | Stop reason: HOSPADM

## 2020-11-11 RX ORDER — BUPIVACAINE HYDROCHLORIDE 5 MG/ML
INJECTION, SOLUTION EPIDURAL; INTRACAUDAL
Status: COMPLETED | OUTPATIENT
Start: 2020-11-11 | End: 2020-11-11

## 2020-11-11 RX ORDER — MAGNESIUM HYDROXIDE 1200 MG/15ML
LIQUID ORAL CONTINUOUS PRN
Status: COMPLETED | OUTPATIENT
Start: 2020-11-11 | End: 2020-11-11

## 2020-11-11 RX ORDER — ACETAMINOPHEN 325 MG/1
650 TABLET ORAL EVERY 6 HOURS PRN
Status: DISCONTINUED | OUTPATIENT
Start: 2020-11-11 | End: 2020-11-14 | Stop reason: HOSPADM

## 2020-11-11 RX ORDER — SODIUM CHLORIDE 9 MG/ML
INJECTION, SOLUTION INTRAVENOUS CONTINUOUS
Status: DISCONTINUED | OUTPATIENT
Start: 2020-11-11 | End: 2020-11-11

## 2020-11-11 RX ORDER — ONDANSETRON 2 MG/ML
4 INJECTION INTRAMUSCULAR; INTRAVENOUS EVERY 6 HOURS PRN
Status: DISCONTINUED | OUTPATIENT
Start: 2020-11-11 | End: 2020-11-14 | Stop reason: HOSPADM

## 2020-11-11 RX ORDER — SODIUM CHLORIDE 0.9 % (FLUSH) 0.9 %
10 SYRINGE (ML) INJECTION PRN
Status: DISCONTINUED | OUTPATIENT
Start: 2020-11-11 | End: 2020-11-11 | Stop reason: HOSPADM

## 2020-11-11 RX ORDER — PROMETHAZINE HYDROCHLORIDE 25 MG/ML
6.25 INJECTION, SOLUTION INTRAMUSCULAR; INTRAVENOUS
Status: DISCONTINUED | OUTPATIENT
Start: 2020-11-11 | End: 2020-11-11 | Stop reason: HOSPADM

## 2020-11-11 RX ORDER — PROPOFOL 10 MG/ML
INJECTION, EMULSION INTRAVENOUS PRN
Status: DISCONTINUED | OUTPATIENT
Start: 2020-11-11 | End: 2020-11-11 | Stop reason: SDUPTHER

## 2020-11-11 RX ORDER — LORAZEPAM 2 MG/ML
1 INJECTION INTRAMUSCULAR ONCE
Status: COMPLETED | OUTPATIENT
Start: 2020-11-11 | End: 2020-11-11

## 2020-11-11 RX ORDER — LABETALOL HYDROCHLORIDE 5 MG/ML
5 INJECTION, SOLUTION INTRAVENOUS EVERY 10 MIN PRN
Status: DISCONTINUED | OUTPATIENT
Start: 2020-11-11 | End: 2020-11-11 | Stop reason: HOSPADM

## 2020-11-11 RX ORDER — APREPITANT 40 MG/1
40 CAPSULE ORAL ONCE
Status: COMPLETED | OUTPATIENT
Start: 2020-11-11 | End: 2020-11-11

## 2020-11-11 RX ORDER — SODIUM CHLORIDE 0.9 % (FLUSH) 0.9 %
10 SYRINGE (ML) INJECTION EVERY 12 HOURS SCHEDULED
Status: DISCONTINUED | OUTPATIENT
Start: 2020-11-11 | End: 2020-11-11 | Stop reason: HOSPADM

## 2020-11-11 RX ORDER — PROMETHAZINE HYDROCHLORIDE 25 MG/1
12.5 TABLET ORAL EVERY 6 HOURS PRN
Status: DISCONTINUED | OUTPATIENT
Start: 2020-11-11 | End: 2020-11-14 | Stop reason: HOSPADM

## 2020-11-11 RX ORDER — FENTANYL CITRATE 50 UG/ML
25 INJECTION, SOLUTION INTRAMUSCULAR; INTRAVENOUS EVERY 5 MIN PRN
Status: COMPLETED | OUTPATIENT
Start: 2020-11-11 | End: 2020-11-11

## 2020-11-11 RX ORDER — TRAMADOL HYDROCHLORIDE 50 MG/1
50 TABLET ORAL EVERY 6 HOURS PRN
Status: DISCONTINUED | OUTPATIENT
Start: 2020-11-11 | End: 2020-11-14 | Stop reason: HOSPADM

## 2020-11-11 RX ORDER — POTASSIUM CHLORIDE 20 MEQ/1
40 TABLET, EXTENDED RELEASE ORAL PRN
Status: DISCONTINUED | OUTPATIENT
Start: 2020-11-11 | End: 2020-11-14 | Stop reason: HOSPADM

## 2020-11-11 RX ORDER — PHENYLEPHRINE HCL IN 0.9% NACL 1 MG/10 ML
SYRINGE (ML) INTRAVENOUS PRN
Status: DISCONTINUED | OUTPATIENT
Start: 2020-11-11 | End: 2020-11-11 | Stop reason: SDUPTHER

## 2020-11-11 RX ORDER — GABAPENTIN 300 MG/1
600 CAPSULE ORAL 3 TIMES DAILY
Status: DISCONTINUED | OUTPATIENT
Start: 2020-11-11 | End: 2020-11-11

## 2020-11-11 RX ORDER — FENTANYL CITRATE 50 UG/ML
25 INJECTION, SOLUTION INTRAMUSCULAR; INTRAVENOUS
Status: DISCONTINUED | OUTPATIENT
Start: 2020-11-11 | End: 2020-11-14 | Stop reason: HOSPADM

## 2020-11-11 RX ORDER — ALBUTEROL SULFATE 2.5 MG/3ML
2.5 SOLUTION RESPIRATORY (INHALATION) EVERY 6 HOURS PRN
Status: DISCONTINUED | OUTPATIENT
Start: 2020-11-11 | End: 2020-11-14 | Stop reason: HOSPADM

## 2020-11-11 RX ORDER — DEXAMETHASONE SODIUM PHOSPHATE 4 MG/ML
INJECTION, SOLUTION INTRA-ARTICULAR; INTRALESIONAL; INTRAMUSCULAR; INTRAVENOUS; SOFT TISSUE PRN
Status: DISCONTINUED | OUTPATIENT
Start: 2020-11-11 | End: 2020-11-11 | Stop reason: SDUPTHER

## 2020-11-11 RX ORDER — LIDOCAINE HYDROCHLORIDE 20 MG/ML
INJECTION, SOLUTION EPIDURAL; INFILTRATION; INTRACAUDAL; PERINEURAL PRN
Status: DISCONTINUED | OUTPATIENT
Start: 2020-11-11 | End: 2020-11-11 | Stop reason: SDUPTHER

## 2020-11-11 RX ORDER — ONDANSETRON 2 MG/ML
INJECTION INTRAMUSCULAR; INTRAVENOUS PRN
Status: DISCONTINUED | OUTPATIENT
Start: 2020-11-11 | End: 2020-11-11 | Stop reason: SDUPTHER

## 2020-11-11 RX ORDER — SODIUM CHLORIDE 0.9 % (FLUSH) 0.9 %
10 SYRINGE (ML) INJECTION PRN
Status: DISCONTINUED | OUTPATIENT
Start: 2020-11-11 | End: 2020-11-14 | Stop reason: HOSPADM

## 2020-11-11 RX ORDER — SODIUM CHLORIDE 9 MG/ML
INJECTION, SOLUTION INTRAVENOUS CONTINUOUS
Status: DISCONTINUED | OUTPATIENT
Start: 2020-11-11 | End: 2020-11-13

## 2020-11-11 RX ORDER — MIDAZOLAM HYDROCHLORIDE 1 MG/ML
INJECTION INTRAMUSCULAR; INTRAVENOUS PRN
Status: DISCONTINUED | OUTPATIENT
Start: 2020-11-11 | End: 2020-11-11 | Stop reason: SDUPTHER

## 2020-11-11 RX ORDER — FENTANYL CITRATE 50 UG/ML
INJECTION, SOLUTION INTRAMUSCULAR; INTRAVENOUS PRN
Status: DISCONTINUED | OUTPATIENT
Start: 2020-11-11 | End: 2020-11-11 | Stop reason: SDUPTHER

## 2020-11-11 RX ORDER — ACETAMINOPHEN 650 MG/1
650 SUPPOSITORY RECTAL EVERY 6 HOURS PRN
Status: DISCONTINUED | OUTPATIENT
Start: 2020-11-11 | End: 2020-11-14 | Stop reason: HOSPADM

## 2020-11-11 RX ORDER — ALBUTEROL SULFATE 2.5 MG/3ML
2.5 SOLUTION RESPIRATORY (INHALATION) EVERY 6 HOURS PRN
Status: DISCONTINUED | OUTPATIENT
Start: 2020-11-11 | End: 2020-11-13

## 2020-11-11 RX ORDER — ROCURONIUM BROMIDE 10 MG/ML
INJECTION, SOLUTION INTRAVENOUS PRN
Status: DISCONTINUED | OUTPATIENT
Start: 2020-11-11 | End: 2020-11-11 | Stop reason: SDUPTHER

## 2020-11-11 RX ORDER — SCOLOPAMINE TRANSDERMAL SYSTEM 1 MG/1
1 PATCH, EXTENDED RELEASE TRANSDERMAL ONCE
Status: COMPLETED | OUTPATIENT
Start: 2020-11-11 | End: 2020-11-14

## 2020-11-11 RX ORDER — SODIUM CHLORIDE 0.9 % (FLUSH) 0.9 %
10 SYRINGE (ML) INJECTION EVERY 12 HOURS SCHEDULED
Status: DISCONTINUED | OUTPATIENT
Start: 2020-11-11 | End: 2020-11-14 | Stop reason: HOSPADM

## 2020-11-11 RX ORDER — DICYCLOMINE HYDROCHLORIDE 10 MG/1
10 CAPSULE ORAL 4 TIMES DAILY PRN
Status: DISCONTINUED | OUTPATIENT
Start: 2020-11-11 | End: 2020-11-14 | Stop reason: HOSPADM

## 2020-11-11 RX ORDER — ALBUMIN, HUMAN INJ 5% 5 %
SOLUTION INTRAVENOUS PRN
Status: DISCONTINUED | OUTPATIENT
Start: 2020-11-11 | End: 2020-11-11 | Stop reason: SDUPTHER

## 2020-11-11 RX ORDER — POTASSIUM CHLORIDE 7.45 MG/ML
10 INJECTION INTRAVENOUS PRN
Status: DISCONTINUED | OUTPATIENT
Start: 2020-11-11 | End: 2020-11-14 | Stop reason: HOSPADM

## 2020-11-11 RX ORDER — LEVOTHYROXINE SODIUM 0.03 MG/1
25 TABLET ORAL DAILY
Status: DISCONTINUED | OUTPATIENT
Start: 2020-11-12 | End: 2020-11-14 | Stop reason: HOSPADM

## 2020-11-11 RX ORDER — GABAPENTIN 300 MG/1
600 CAPSULE ORAL 3 TIMES DAILY
Status: DISCONTINUED | OUTPATIENT
Start: 2020-11-11 | End: 2020-11-14 | Stop reason: HOSPADM

## 2020-11-11 RX ORDER — HEPARIN SODIUM 5000 [USP'U]/ML
5000 INJECTION, SOLUTION INTRAVENOUS; SUBCUTANEOUS ONCE
Status: COMPLETED | OUTPATIENT
Start: 2020-11-11 | End: 2020-11-11

## 2020-11-11 RX ADMIN — Medication 100 MCG: at 10:13

## 2020-11-11 RX ADMIN — FENTANYL CITRATE 25 MCG: 50 INJECTION, SOLUTION INTRAMUSCULAR; INTRAVENOUS at 13:00

## 2020-11-11 RX ADMIN — Medication 100 MCG: at 11:12

## 2020-11-11 RX ADMIN — FENTANYL CITRATE 25 MCG: 50 INJECTION, SOLUTION INTRAMUSCULAR; INTRAVENOUS at 12:50

## 2020-11-11 RX ADMIN — CEFOXITIN SODIUM 2 G: 2 POWDER, FOR SOLUTION INTRAVENOUS at 10:01

## 2020-11-11 RX ADMIN — Medication 100 MCG: at 10:53

## 2020-11-11 RX ADMIN — FENTANYL CITRATE 25 MCG: 50 INJECTION, SOLUTION INTRAMUSCULAR; INTRAVENOUS at 12:55

## 2020-11-11 RX ADMIN — ROCURONIUM BROMIDE 10 MG: 10 INJECTION INTRAVENOUS at 10:30

## 2020-11-11 RX ADMIN — ROCURONIUM BROMIDE 10 MG: 10 INJECTION INTRAVENOUS at 11:00

## 2020-11-11 RX ADMIN — LIDOCAINE HYDROCHLORIDE 60 MG: 20 INJECTION, SOLUTION EPIDURAL; INFILTRATION; INTRACAUDAL; PERINEURAL at 10:02

## 2020-11-11 RX ADMIN — DEXAMETHASONE SODIUM PHOSPHATE 8 MG: 4 INJECTION, SOLUTION INTRAMUSCULAR; INTRAVENOUS at 10:17

## 2020-11-11 RX ADMIN — PROPOFOL 120 MG: 10 INJECTION, EMULSION INTRAVENOUS at 10:02

## 2020-11-11 RX ADMIN — Medication 100 MCG: at 10:50

## 2020-11-11 RX ADMIN — Medication 10 ML: at 20:34

## 2020-11-11 RX ADMIN — ROCURONIUM BROMIDE 10 MG: 10 INJECTION INTRAVENOUS at 11:31

## 2020-11-11 RX ADMIN — Medication 100 MCG: at 10:23

## 2020-11-11 RX ADMIN — SODIUM CHLORIDE: 9 INJECTION, SOLUTION INTRAVENOUS at 18:07

## 2020-11-11 RX ADMIN — Medication 100 MCG: at 11:21

## 2020-11-11 RX ADMIN — Medication 100 MCG: at 11:17

## 2020-11-11 RX ADMIN — LORAZEPAM 1 MG: 2 INJECTION INTRAMUSCULAR; INTRAVENOUS at 13:37

## 2020-11-11 RX ADMIN — APREPITANT 40 MG: 40 CAPSULE ORAL at 09:19

## 2020-11-11 RX ADMIN — SODIUM CHLORIDE: 9 INJECTION, SOLUTION INTRAVENOUS at 09:27

## 2020-11-11 RX ADMIN — ROCURONIUM BROMIDE 30 MG: 10 INJECTION INTRAVENOUS at 10:02

## 2020-11-11 RX ADMIN — MIDAZOLAM 1 MG: 1 INJECTION INTRAMUSCULAR; INTRAVENOUS at 10:00

## 2020-11-11 RX ADMIN — HEPARIN SODIUM 5000 UNITS: 5000 INJECTION INTRAVENOUS; SUBCUTANEOUS at 09:21

## 2020-11-11 RX ADMIN — SODIUM CHLORIDE: 9 INJECTION, SOLUTION INTRAVENOUS at 09:19

## 2020-11-11 RX ADMIN — FENTANYL CITRATE 25 MCG: 50 INJECTION, SOLUTION INTRAMUSCULAR; INTRAVENOUS at 13:07

## 2020-11-11 RX ADMIN — FENTANYL CITRATE 50 MCG: 50 INJECTION INTRAMUSCULAR; INTRAVENOUS at 10:55

## 2020-11-11 RX ADMIN — FENTANYL CITRATE 50 MCG: 50 INJECTION INTRAMUSCULAR; INTRAVENOUS at 10:05

## 2020-11-11 RX ADMIN — TRAMADOL HYDROCHLORIDE 50 MG: 50 TABLET, FILM COATED ORAL at 16:54

## 2020-11-11 RX ADMIN — MIDAZOLAM 1 MG: 1 INJECTION INTRAMUSCULAR; INTRAVENOUS at 10:03

## 2020-11-11 RX ADMIN — GABAPENTIN 600 MG: 300 CAPSULE ORAL at 18:07

## 2020-11-11 RX ADMIN — ALBUMIN (HUMAN) 250 ML: 12.5 SOLUTION INTRAVENOUS at 11:37

## 2020-11-11 RX ADMIN — ONDANSETRON 4 MG: 2 INJECTION INTRAMUSCULAR; INTRAVENOUS at 12:04

## 2020-11-11 ASSESSMENT — PAIN DESCRIPTION - DESCRIPTORS
DESCRIPTORS: DISCOMFORT
DESCRIPTORS: SORE
DESCRIPTORS: ACHING
DESCRIPTORS: SORE
DESCRIPTORS: ACHING;DULL;SORE
DESCRIPTORS: SORE

## 2020-11-11 ASSESSMENT — PULMONARY FUNCTION TESTS
PIF_VALUE: 18
PIF_VALUE: 15
PIF_VALUE: 15
PIF_VALUE: 16
PIF_VALUE: 17
PIF_VALUE: 18
PIF_VALUE: 16
PIF_VALUE: 15
PIF_VALUE: 17
PIF_VALUE: 19
PIF_VALUE: 18
PIF_VALUE: 17
PIF_VALUE: 18
PIF_VALUE: 15
PIF_VALUE: 17
PIF_VALUE: 15
PIF_VALUE: 16
PIF_VALUE: 18
PIF_VALUE: 1
PIF_VALUE: 18
PIF_VALUE: 16
PIF_VALUE: 23
PIF_VALUE: 15
PIF_VALUE: 15
PIF_VALUE: 18
PIF_VALUE: 17
PIF_VALUE: 15
PIF_VALUE: 18
PIF_VALUE: 17
PIF_VALUE: 17
PIF_VALUE: 15
PIF_VALUE: 18
PIF_VALUE: 16
PIF_VALUE: 18
PIF_VALUE: 17
PIF_VALUE: 16
PIF_VALUE: 15
PIF_VALUE: 15
PIF_VALUE: 16
PIF_VALUE: 15
PIF_VALUE: 0
PIF_VALUE: 19
PIF_VALUE: 17
PIF_VALUE: 15
PIF_VALUE: 18
PIF_VALUE: 1
PIF_VALUE: 15
PIF_VALUE: 16
PIF_VALUE: 18
PIF_VALUE: 16
PIF_VALUE: 15
PIF_VALUE: 17
PIF_VALUE: 16
PIF_VALUE: 17
PIF_VALUE: 16
PIF_VALUE: 18
PIF_VALUE: 16
PIF_VALUE: 18
PIF_VALUE: 18
PIF_VALUE: 17
PIF_VALUE: 0
PIF_VALUE: 18
PIF_VALUE: 15
PIF_VALUE: 15
PIF_VALUE: 2
PIF_VALUE: 19
PIF_VALUE: 15
PIF_VALUE: 16
PIF_VALUE: 15
PIF_VALUE: 15
PIF_VALUE: 17
PIF_VALUE: 16
PIF_VALUE: 18
PIF_VALUE: 17
PIF_VALUE: 19
PIF_VALUE: 16
PIF_VALUE: 17
PIF_VALUE: 18
PIF_VALUE: 16
PIF_VALUE: 17
PIF_VALUE: 19
PIF_VALUE: 15
PIF_VALUE: 16
PIF_VALUE: 15
PIF_VALUE: 16
PIF_VALUE: 15
PIF_VALUE: 15
PIF_VALUE: 16
PIF_VALUE: 3
PIF_VALUE: 18
PIF_VALUE: 15
PIF_VALUE: 17
PIF_VALUE: 16
PIF_VALUE: 19
PIF_VALUE: 17
PIF_VALUE: 18
PIF_VALUE: 17
PIF_VALUE: 15
PIF_VALUE: 16
PIF_VALUE: 18
PIF_VALUE: 0
PIF_VALUE: 17
PIF_VALUE: 18
PIF_VALUE: 16
PIF_VALUE: 18
PIF_VALUE: 18
PIF_VALUE: 15
PIF_VALUE: 17
PIF_VALUE: 17
PIF_VALUE: 15
PIF_VALUE: 16
PIF_VALUE: 18
PIF_VALUE: 18
PIF_VALUE: 16
PIF_VALUE: 15
PIF_VALUE: 0
PIF_VALUE: 16
PIF_VALUE: 18
PIF_VALUE: 15
PIF_VALUE: 18
PIF_VALUE: 7
PIF_VALUE: 18
PIF_VALUE: 17
PIF_VALUE: 18
PIF_VALUE: 15
PIF_VALUE: 3
PIF_VALUE: 16
PIF_VALUE: 1
PIF_VALUE: 18
PIF_VALUE: 18
PIF_VALUE: 15
PIF_VALUE: 16
PIF_VALUE: 17
PIF_VALUE: 2
PIF_VALUE: 1
PIF_VALUE: 18
PIF_VALUE: 19
PIF_VALUE: 16
PIF_VALUE: 18
PIF_VALUE: 18
PIF_VALUE: 17
PIF_VALUE: 15
PIF_VALUE: 18

## 2020-11-11 ASSESSMENT — PAIN DESCRIPTION - PAIN TYPE
TYPE: SURGICAL PAIN

## 2020-11-11 ASSESSMENT — PAIN DESCRIPTION - ONSET
ONSET: ON-GOING

## 2020-11-11 ASSESSMENT — PAIN DESCRIPTION - PROGRESSION
CLINICAL_PROGRESSION: GRADUALLY IMPROVING
CLINICAL_PROGRESSION: NOT CHANGED
CLINICAL_PROGRESSION: GRADUALLY IMPROVING
CLINICAL_PROGRESSION: NOT CHANGED
CLINICAL_PROGRESSION: NOT CHANGED

## 2020-11-11 ASSESSMENT — PAIN DESCRIPTION - ORIENTATION
ORIENTATION: MID

## 2020-11-11 ASSESSMENT — PAIN SCALES - GENERAL
PAINLEVEL_OUTOF10: 9
PAINLEVEL_OUTOF10: 6
PAINLEVEL_OUTOF10: 0
PAINLEVEL_OUTOF10: 6
PAINLEVEL_OUTOF10: 4
PAINLEVEL_OUTOF10: 6

## 2020-11-11 ASSESSMENT — PAIN DESCRIPTION - FREQUENCY
FREQUENCY: CONTINUOUS

## 2020-11-11 ASSESSMENT — PAIN DESCRIPTION - LOCATION
LOCATION: ABDOMEN

## 2020-11-11 ASSESSMENT — PAIN - FUNCTIONAL ASSESSMENT
PAIN_FUNCTIONAL_ASSESSMENT: PREVENTS OR INTERFERES SOME ACTIVE ACTIVITIES AND ADLS
PAIN_FUNCTIONAL_ASSESSMENT: 0-10

## 2020-11-11 NOTE — PROGRESS NOTES
Patient C/O surgical pain to abdomen at 6-8 of 10. Patient also C/O headache at 8 of 10. Dr. Gianna Martinez called for orders.

## 2020-11-11 NOTE — PROGRESS NOTES
4 Eyes Skin Assessment     NAME:  Gale Hernandez OF BIRTH:  1950  MEDICAL RECORD NUMBER:  [id number]    The patient is being assess for  Admission    I agree that 2 RN's have performed a thorough Head to Toe Skin Assessment on the patient. ALL assessment sites listed below have been assessed. Areas assessed by both nurses:    Head, Face, Ears, Shoulders, Back, Chest, Arms, Elbows, Hands, Sacrum. Buttock, Coccyx, Ischium and Legs. Feet and Heels        Does the Patient have a Wound?  No noted wound(s)       Kevin Prevention initiated:  Yes   Wound Care Orders initiated:  NA    Pressure Injury (Stage 3,4, Unstageable, DTI, NWPT, and Complex wounds) if present place consult order under [de-identified] NA    New and Established Ostomies if present place consult order under : NA      Nurse 1 eSignature: Electronically signed by Antonella Ward RN on 11/11/20 at 6:27 PM EST    **SHARE this note so that the co-signing nurse is able to place an eSignature**    Nurse 2 eSignature: Electronically signed by Karli Gabriel RN on 11/11/20 at 7:01 PM EST

## 2020-11-11 NOTE — H&P
negative  GASTROINTESTINAL:  positive for abdominal pain  GENITOURINARY:  negative  HEMATOLOGIC/LYMPHATIC:  negative  ENDOCRINE:  negative  All other systems negative    PHYSICAL EXAM:    VITALS:  /67   Pulse 69   Temp 97 °F (36.1 °C) (Temporal)   Resp 14   Ht 5' 6\" (1.676 m)   Wt 155 lb (70.3 kg)   LMP  (LMP Unknown)   SpO2 96%   BMI 25.02 kg/m²   INTAKE/OUTPUT:   No intake/output data recorded. No intake/output data recorded. CONSTITUTIONAL:  awake, alert, no apparent distress and normal weight  ENT:  normocepalic, without obvious abnormality  NECK:  supple, symmetrical, trachea midline   LUNGS:  clear to auscultation, no crackles or wheezing  CARDIOVASCULAR:  regular rate and rhythm and no murmur noted  ABDOMEN:  Non distended, non tender today  MUSCULOSKELETAL:  0+ pitting edema lower extremities  NEUROLOGIC:  Mental Status Exam:  Level of Alertness:   awake  Orientation:   person, place, time      ASSESSMENT AND PLAN:    Biliary colic   For cholecystectomy today  Recurring sigmoid diverticulitis   For sigmoid resection today    The risks, benefits and alternatives to the planned procedure were discussed. Patient expressed an understanding and is willing to proceed.     Electronically signed by Bala Herrmann MD on 11/11/2020 at 9:40 AM    Bala Herrmann

## 2020-11-11 NOTE — PROGRESS NOTES
Headache easing. Abdominal surgical pain at 4 of 10 and tolerable. Resting more comfortably. VSS. IV patent. Abdominal surgical dressing with small shadow drainage noted.

## 2020-11-11 NOTE — ANESTHESIA POSTPROCEDURE EVALUATION
Department of Anesthesiology  Postprocedure Note    Patient: Goran Pinon  MRN: [id number]  YOB: 1950  Date of evaluation: 11/11/2020  Time:  4:15 PM     Procedure Summary     Date:  11/11/20 Room / Location:  [facility] [address] [place] [address]    Anesthesia Start:  1185 Anesthesia Stop:  1236    Procedure:  CHOLECYSTECTOMY AND SIGMOID COLECTOMY (N/A Abdomen) Diagnosis:       Diverticulitis      (DIVERTICULITIS)    Surgeon:  Augustine Rodriguez MD Responsible Provider:  Abdirizak Brown MD    Anesthesia Type:  general ASA Status:  3          Anesthesia Type: general    Judson Phase I: Judson Score: 8    Judson Phase II:      Last vitals: Reviewed and per EMR flowsheets.        Anesthesia Post Evaluation    Patient location during evaluation: bedside  Patient participation: complete - patient participated  Level of consciousness: awake  Pain score: 3  Airway patency: patent  Nausea & Vomiting: no nausea and no vomiting  Complications: no  Cardiovascular status: blood pressure returned to baseline  Respiratory status: acceptable  Hydration status: euvolemic

## 2020-11-11 NOTE — PROGRESS NOTES
Patient C/O surgical pain at 6 of 10 and medicated per order. See MAR. VSS. -Cr around baseline  -renally dose meds  -nephrology following and appreciate recs -orthostatic hypotension 2/2 to adrenal insuff? Low cortisol last admission. pending w/u for AI  -continue with midodrine and florinef  -cortisol from 11 am on 9/21, 6.3

## 2020-11-11 NOTE — PROGRESS NOTES
Patietnt admitted to PACU from [address] [facility] [address]. Patient opens eyes to name, drowsy. Resp easy unlabored on 3L NC with SaO2 100%. Abdominal surgical dressing dry and intact. Urinary catheter intact patent to gravity drainage with clear yellow urine. Monitor in SR. Moving all extremities to command.

## 2020-11-11 NOTE — BRIEF OP NOTE
Brief Postoperative Note      Patient: Loree Will  YOB: 1950  MRN: [id number]    Date of Procedure: 11/11/2020    Pre-Op Diagnosis: Sigmoid DIVERTICULITIS, biliary colic    Post-Op Diagnosis: Same       Procedure(s):  CHOLECYSTECTOMY AND SIGMOID COLECTOMY    Surgeon(s):  Neo Alexis MD    Assistant:  Surgical Assistant: Jaya Gil    Anesthesia: General    Estimated Blood Loss (mL): less than 265     Complications: None    Specimens:   ID Type Source Tests Collected by Time Destination   A : a gallbladder Tissue Tissue SURGICAL PATHOLOGY Neo Alexis MD 11/11/2020 1039    B : b sigmoid colon Tissue Tissue SURGICAL PATHOLOGY Neo Alexis MD 11/11/2020 1040        Implants:  * No implants in log *      Drains:   Urethral Catheter Non-latex 16 fr (Active)       Findings: dense inflammatory reaction in pelvis    Electronically signed by Layla Wright MD on 11/11/2020 at 12:21 PM

## 2020-11-11 NOTE — PROGRESS NOTES
Pt arrived to floor from PACU. Oriented to room and call light system. Dressing to abdomen dry, intact, old drainage noted. Rated pain 9/10, gave pain medication per order. Family at bedside. Resting in bed with call light in reach.

## 2020-11-11 NOTE — ANESTHESIA PRE PROCEDURE
[facility] Department of Anesthesiology  Pre-Anesthesia Evaluation/Consultation       Name:  Aston Fried  : 1950  Age:  72 y.o.                                            MRN:  [id number]  Date: 2020           Surgeon: Surgeon(s):  Sonam Lang MD    Procedure: Procedure(s):  CHOLECYSTECTOMY AND SIGMOID COLECTOMY     Allergies   Allergen Reactions    Ciprofloxacin Nausea And Vomiting     SEVERE    Nsaids      Kidney disease    Abilify [Aripiprazole] Itching    Benadryl [Diphenhydramine] Other (See Comments)     COMPLETTELY SEDATES HER-PER DAUGHTER    Darvon [Propoxyphene] Rash    Dilaudid [Hydromorphone Hcl] Itching     CAN TOLERATE 1 DOSE IF NOT GIVEN BACK TO BACK-    Hydrocodone-Acetaminophen Nausea And Vomiting     2020--pt states that only the hydrodocone-acetaminophen combination causes a reaction, but pt doesn't react to acetaminophen by Cuong Doll RN    Morphine Itching and Nausea And Vomiting    Percocet [Oxycodone-Acetaminophen] Other (See Comments)     Makes her loopy,HALLUCINATIONS    Trintellix [Vortioxetine] Other (See Comments)     Hands/feet jittery, nausea     Patient Active Problem List   Diagnosis    Pleural effusion    SVT (supraventricular tachycardia) (HCC)    Varicose vein    Fibromyalgia    Lumbar disc disease    Migraine    Hypercholesteremia    Abdominal pain    Peripheral edema    Anxiety state    Herniated lumbar intervertebral disc    Leg pain, right    Leg pain, left    Chronic low back pain    Migraine without status migrainosus, not intractable    Spider veins of both lower extremities    Vasovagal syncope    Headache, chronic daily    Chronic renal failure, stage 3 (moderate)    Chronic pain syndrome    Mild episode of recurrent major depressive disorder ([address]    Diverticulitis    Acute diverticulitis     Past Medical History:   Diagnosis Date    Anxiety     Cervical herniated disc     Circulation problem  COPD (chronic obstructive pulmonary disease) [facility])     CRF (chronic renal failure), stage 3 (moderate)     Depression     Depression     Diverticulosis of colon     Fibromyalgia     Hx of blood clots     DVT    Hyperlipidemia     Joint ache     Kidney failure     states begining stage 4 since summer '17    Lumbar disc disease     Lumbar disc disease     Migraine     Muscle spasm of back     Pleural effusion     Prolonged emergence from general anesthesia     SVT (supraventricular tachycardia) ([facility])     Thyroid disease     HYPOTHYROIDISM    Varicose vein      Past Surgical History:   Procedure Laterality Date    COLONOSCOPY      HERNIA REPAIR      right inguinal and UMBILICAL-2 SURGERIES    HYSTERECTOMY      HYSTERECTOMY, VAGINAL      PAIN MANAGEMENT PROCEDURE Left 6/12/2020    LUMBAR EPIDURAL STEROID INJECTION LEFT L4-L5 performed by Chris Lind MD at [place] [address]       Social History     Tobacco Use    Smoking status: Current Every Day Smoker     Packs/day: 0.25     Years: 45.00     Pack years: 11.25     Types: Cigarettes     Start date: 8/16/2014    Smokeless tobacco: Never Used    Tobacco comment: would like to quit   Substance Use Topics    Alcohol use: No     Alcohol/week: 0.0 standard drinks    Drug use: Never     Medications  No current facility-administered medications on file prior to encounter. Current Outpatient Medications on File Prior to Encounter   Medication Sig Dispense Refill    acetaminophen (TYLENOL) 500 MG tablet Take 1,000 mg by mouth 2 times daily      traMADol (ULTRAM) 50 MG tablet Take 1 tablet by mouth 2 times daily as needed for Pain for up to 90 days.  60 tablet 2    tiZANidine (ZANAFLEX) 4 MG tablet as needed for Pain Back pain      albuterol (PROVENTIL) (2.5 MG/3ML) 0.083% nebulizer solution Take 3 mLs by nebulization every 6 hours as needed for Wheezing 120 each 0    levothyroxine (SYNTHROID) 25 MCG tablet TAKE 1 TABLET BY MOUTH DAILY 90 tablet 1    dicyclomine (BENTYL) 10 MG capsule Take 1 capsule by mouth 4 times daily as needed (abdominal cramping and diarrhea) 120 capsule 1    promethazine (PHENERGAN) 12.5 MG tablet Take 1 tablet by mouth every 8 hours as needed for Nausea TAKE 1 TABLET BY MOUTH EVERY DAY AS NEEDED 90 tablet 0    PROVENTIL  (90 BASE) MCG/ACT inhaler INHALE 2 PUFFS BY MOUTH EVERY 6 HOURS AS NEEDED FOR WHEEZING 6.7 g 1     Current Facility-Administered Medications   Medication Dose Route Frequency Provider Last Rate Last Dose    heparin (porcine) injection 5,000 Units  5,000 Units Subcutaneous Once Sky Horton MD        0.9 % sodium chloride infusion   Intravenous Continuous Bill Gee MD        sodium chloride flush 0.9 % injection 10 mL  10 mL Intravenous 2 times per day Bill Gee MD        sodium chloride flush 0.9 % injection 10 mL  10 mL Intravenous PRN Bill Gee MD        cefOXitin (MEFOXIN) 2 g in dextrose 5% 50 mL (mini-bag)  2 g Intravenous Once Sky Horton MD        influenza A&B vaccine (FLUAD QUADRIVALENT) injection 0.5 mL  0.5 mL Intramuscular Prior to discharge Sky Horton MD         Vital Signs (Current) There were no vitals filed for this visit. Vital Signs Statistics (for past 48 hrs)     No data recorded    BP Readings from Last 3 Encounters:   09/01/20 124/76   08/25/20 (!) 148/66   08/21/20 (!) 120/57     BMI  Body mass index is 24.86 kg/m². Estimated body mass index is 24.86 kg/m² as calculated from the following:    Height as of this encounter: 5' 6\" (1.676 m). Weight as of this encounter: 154 lb (69.9 kg).     CBC   Lab Results   Component Value Date    WBC 6.3 08/25/2020    RBC 3.63 08/25/2020    HGB 11.4 08/25/2020    HCT 34.7 08/25/2020    MCV 95.5 08/25/2020    RDW 14.4 08/25/2020     08/25/2020     CMP    Lab Results   Component Value Date     08/25/2020    K 4.4 08/25/2020     08/25/2020    CO2 23 08/25/2020    BUN 8 08/25/2020    CREATININE 1.0 08/25/2020    GFRAA >60 08/25/2020    GFRAA 59 06/14/2013    AGRATIO 1.5 08/21/2020    LABGLOM 55 08/25/2020    GLUCOSE 87 08/25/2020    PROT 5.5 08/23/2020    PROT 6.5 02/10/2012    CALCIUM 8.1 08/25/2020    BILITOT <0.2 08/23/2020    ALKPHOS 70 08/23/2020    AST 14 08/23/2020    ALT 6 08/23/2020     BMP    Lab Results   Component Value Date     08/25/2020    K 4.4 08/25/2020     08/25/2020    CO2 23 08/25/2020    BUN 8 08/25/2020    CREATININE 1.0 08/25/2020    CALCIUM 8.1 08/25/2020    GFRAA >60 08/25/2020    GFRAA 59 06/14/2013    LABGLOM 55 08/25/2020    GLUCOSE 87 08/25/2020     POCGlucose  No results for input(s): GLUCOSE in the last 72 hours. Coags    Lab Results   Component Value Date    PROTIME 10.1 03/25/2014    INR 0.90 03/25/2014    APTT 25.2 84/77/5898     HCG (If Applicable) No results found for: PREGTESTUR, PREGSERUM, HCG, HCGQUANT   ABGs No results found for: PHART, PO2ART, YVR8BCV, EDJ9GFN, BEART, R1FAEXBO   Type & Screen (If Applicable)  No results found for: LABABO, LABRH                         BMI: Wt Readings from Last 3 Encounters:       NPO Status: 8 hours                          Anesthesia Evaluation  Patient summary reviewed   history of anesthetic complications: PONV.   Airway: Mallampati: III  TM distance: >3 FB   Neck ROM: full   Dental:    (+) edentulous      Pulmonary:normal exam    (+) COPD:                             Cardiovascular:  Exercise tolerance: good (>4 METS),   (+) dysrhythmias: SVT,       ECG reviewed  Rhythm: regular             Beta Blocker:  Not on Beta Blocker         Neuro/Psych:   (+) neuromuscular disease:, headaches:, psychiatric history:depression/anxiety             GI/Hepatic/Renal:   (+) renal disease: CRI,           Endo/Other:    (+) hypothyroidism::., .                 Abdominal:           Vascular:   + DVT (Hx), . Anesthesia Plan      general     ASA 3       Induction: intravenous. MIPS: Postoperative opioids intended and Prophylactic antiemetics administered. Anesthetic plan and risks discussed with patient. Plan discussed with CRNA. This pre-anesthesia assessment may be used as a history and physical.    DOS STAFF ADDENDUM:    Pt seen and examined, chart reviewed (including anesthesia, drug and allergy history). No interval changes to history and physical examination. Anesthetic plan, risks, benefits, alternatives, and personnel involved discussed with patient. Questions and concerns addressed. Patient(family) verbalized an understanding and agrees to proceed.       Benny Miller MD  November 11, 2020  8:53 AM

## 2020-11-12 LAB
A/G RATIO: 1.4 (ref 1.1–2.2)
ALBUMIN SERPL-MCNC: 3 G/DL (ref 3.4–5)
ALP BLD-CCNC: 69 U/L (ref 40–129)
ALT SERPL-CCNC: 9 U/L (ref 10–40)
ANION GAP SERPL CALCULATED.3IONS-SCNC: 9 MMOL/L (ref 3–16)
AST SERPL-CCNC: 14 U/L (ref 15–37)
BILIRUB SERPL-MCNC: <0.2 MG/DL (ref 0–1)
BUN BLDV-MCNC: 16 MG/DL (ref 7–20)
CALCIUM SERPL-MCNC: 8.1 MG/DL (ref 8.3–10.6)
CHLORIDE BLD-SCNC: 107 MMOL/L (ref 99–110)
CO2: 22 MMOL/L (ref 21–32)
CREAT SERPL-MCNC: 0.9 MG/DL (ref 0.6–1.2)
GFR AFRICAN AMERICAN: >60
GFR NON-AFRICAN AMERICAN: >60
GLOBULIN: 2.2 G/DL
GLUCOSE BLD-MCNC: 139 MG/DL (ref 70–99)
HCT VFR BLD CALC: 34.7 % (ref 36–48)
HEMOGLOBIN: 11.3 G/DL (ref 12–16)
MCH RBC QN AUTO: 30.9 PG (ref 26–34)
MCHC RBC AUTO-ENTMCNC: 32.5 G/DL (ref 31–36)
MCV RBC AUTO: 95.1 FL (ref 80–100)
PDW BLD-RTO: 13.8 % (ref 12.4–15.4)
PLATELET # BLD: 169 K/UL (ref 135–450)
PMV BLD AUTO: 9.4 FL (ref 5–10.5)
POTASSIUM REFLEX MAGNESIUM: 4.8 MMOL/L (ref 3.5–5.1)
RBC # BLD: 3.65 M/UL (ref 4–5.2)
SODIUM BLD-SCNC: 138 MMOL/L (ref 136–145)
TOTAL PROTEIN: 5.2 G/DL (ref 6.4–8.2)
WBC # BLD: 12 K/UL (ref 4–11)

## 2020-11-12 PROCEDURE — 90686 IIV4 VACC NO PRSV 0.5 ML IM: CPT | Performed by: SURGERY

## 2020-11-12 PROCEDURE — 99024 POSTOP FOLLOW-UP VISIT: CPT | Performed by: SURGERY

## 2020-11-12 PROCEDURE — 97162 PT EVAL MOD COMPLEX 30 MIN: CPT

## 2020-11-12 PROCEDURE — APPSS15 APP SPLIT SHARED TIME 0-15 MINUTES: Performed by: NURSE PRACTITIONER

## 2020-11-12 PROCEDURE — APPNB15 APP NON BILLABLE TIME 0-15 MINS: Performed by: NURSE PRACTITIONER

## 2020-11-12 PROCEDURE — G0008 ADMIN INFLUENZA VIRUS VAC: HCPCS | Performed by: SURGERY

## 2020-11-12 PROCEDURE — 6360000002 HC RX W HCPCS: Performed by: SURGERY

## 2020-11-12 PROCEDURE — 6360000002 HC RX W HCPCS: Performed by: NURSE PRACTITIONER

## 2020-11-12 PROCEDURE — 85027 COMPLETE CBC AUTOMATED: CPT

## 2020-11-12 PROCEDURE — 36415 COLL VENOUS BLD VENIPUNCTURE: CPT

## 2020-11-12 PROCEDURE — 1200000000 HC SEMI PRIVATE

## 2020-11-12 PROCEDURE — 6370000000 HC RX 637 (ALT 250 FOR IP): Performed by: SURGERY

## 2020-11-12 PROCEDURE — 97116 GAIT TRAINING THERAPY: CPT

## 2020-11-12 PROCEDURE — 97530 THERAPEUTIC ACTIVITIES: CPT

## 2020-11-12 PROCEDURE — 2580000003 HC RX 258: Performed by: SURGERY

## 2020-11-12 PROCEDURE — 80053 COMPREHEN METABOLIC PANEL: CPT

## 2020-11-12 RX ADMIN — TRAMADOL HYDROCHLORIDE 50 MG: 50 TABLET, FILM COATED ORAL at 00:19

## 2020-11-12 RX ADMIN — FENTANYL CITRATE 25 MCG: 50 INJECTION INTRAMUSCULAR; INTRAVENOUS at 11:35

## 2020-11-12 RX ADMIN — Medication 10 ML: at 20:08

## 2020-11-12 RX ADMIN — GABAPENTIN 600 MG: 300 CAPSULE ORAL at 08:15

## 2020-11-12 RX ADMIN — LEVOTHYROXINE SODIUM 25 MCG: 0.03 TABLET ORAL at 06:24

## 2020-11-12 RX ADMIN — INFLUENZA A VIRUS A/VICTORIA/2454/2019 IVR-207 (H1N1) ANTIGEN (PROPIOLACTONE INACTIVATED), INFLUENZA A VIRUS A/HONG KONG/2671/2019 IVR-208 (H3N2) ANTIGEN (PROPIOLACTONE INACTIVATED), INFLUENZA B VIRUS B/VICTORIA/705/2018 BVR-11 ANTIGEN (PROPIOLACTONE INACTIVATED), INFLUENZA B VIRUS B/PHUKET/3073/2013 BVR-1B ANTIGEN (PROPIOLACTONE INACTIVATED) 0.5 ML: 15; 15; 15; 15 INJECTION, SUSPENSION INTRAMUSCULAR at 08:15

## 2020-11-12 RX ADMIN — FENTANYL CITRATE 25 MCG: 50 INJECTION INTRAMUSCULAR; INTRAVENOUS at 20:07

## 2020-11-12 RX ADMIN — ENOXAPARIN SODIUM 40 MG: 40 INJECTION SUBCUTANEOUS at 17:52

## 2020-11-12 RX ADMIN — GABAPENTIN 600 MG: 300 CAPSULE ORAL at 16:08

## 2020-11-12 RX ADMIN — TRAMADOL HYDROCHLORIDE 50 MG: 50 TABLET, FILM COATED ORAL at 08:18

## 2020-11-12 RX ADMIN — ACETAMINOPHEN 650 MG: 325 TABLET ORAL at 19:00

## 2020-11-12 RX ADMIN — SODIUM CHLORIDE: 9 INJECTION, SOLUTION INTRAVENOUS at 20:07

## 2020-11-12 RX ADMIN — FENTANYL CITRATE 25 MCG: 50 INJECTION INTRAMUSCULAR; INTRAVENOUS at 15:56

## 2020-11-12 RX ADMIN — GABAPENTIN 600 MG: 300 CAPSULE ORAL at 21:05

## 2020-11-12 RX ADMIN — TRAMADOL HYDROCHLORIDE 50 MG: 50 TABLET, FILM COATED ORAL at 16:21

## 2020-11-12 ASSESSMENT — PAIN DESCRIPTION - FREQUENCY
FREQUENCY: INTERMITTENT
FREQUENCY: INTERMITTENT

## 2020-11-12 ASSESSMENT — PAIN DESCRIPTION - ORIENTATION
ORIENTATION: MID
ORIENTATION: MID

## 2020-11-12 ASSESSMENT — PAIN DESCRIPTION - PAIN TYPE
TYPE: SURGICAL PAIN
TYPE: SURGICAL PAIN

## 2020-11-12 ASSESSMENT — PAIN SCALES - GENERAL
PAINLEVEL_OUTOF10: 7
PAINLEVEL_OUTOF10: 8
PAINLEVEL_OUTOF10: 7
PAINLEVEL_OUTOF10: 0
PAINLEVEL_OUTOF10: 8
PAINLEVEL_OUTOF10: 8
PAINLEVEL_OUTOF10: 10
PAINLEVEL_OUTOF10: 0
PAINLEVEL_OUTOF10: 10
PAINLEVEL_OUTOF10: 10
PAINLEVEL_OUTOF10: 0

## 2020-11-12 ASSESSMENT — PAIN DESCRIPTION - ONSET
ONSET: ON-GOING
ONSET: ON-GOING

## 2020-11-12 ASSESSMENT — PAIN DESCRIPTION - PROGRESSION
CLINICAL_PROGRESSION: NOT CHANGED
CLINICAL_PROGRESSION: NOT CHANGED

## 2020-11-12 ASSESSMENT — PAIN - FUNCTIONAL ASSESSMENT
PAIN_FUNCTIONAL_ASSESSMENT: PREVENTS OR INTERFERES SOME ACTIVE ACTIVITIES AND ADLS
PAIN_FUNCTIONAL_ASSESSMENT: PREVENTS OR INTERFERES SOME ACTIVE ACTIVITIES AND ADLS

## 2020-11-12 ASSESSMENT — PAIN DESCRIPTION - DESCRIPTORS
DESCRIPTORS: DISCOMFORT
DESCRIPTORS: DISCOMFORT

## 2020-11-12 ASSESSMENT — PAIN DESCRIPTION - LOCATION
LOCATION: ABDOMEN
LOCATION: ABDOMEN

## 2020-11-12 NOTE — OP NOTE
and enter into the peritoneum. We now began adhesiolysis to free the omentum away from the mesh. There  were dense adhesions to the areas where the tacks had been placed. Eventually, we were able to free enough of the mesh; however, we realize  the given the extent of the hernia repair, we would not be able to work  around the mesh and instead divided the mesh down the middle with the  scissors. This allowed better exposure to the abdomen, and we now  continued to free adhesions until the entire abdominal surface was free. This now allowed us to place a Bookwalter retractor, and we initially  inspected the upper abdomen for anticipated cholecystectomy. The  gallbladder was identified. It was free of acute inflammatory change. We did first separate the gallbladder away from the liver bed using  electrocautery. As this was mobilized, it became easier to now identify  the cystic artery which was running anterior to the cystic duct. This  was dissected free, clipped and divided. We continued to free the  gallbladder until it was only on the cystic duct. This was also clipped  and divided. The gallbladder was passed off, and we confirmed  hemostasis along the liver bed. We now repositioned the Bookwalter retractor to expose the pelvis. This  allowed us to sweep the small bowel away from the pelvis and with the  patient in Trendelenburg, we had good exposure of the sigmoid. There  were significant inflammatory adhesions along the entire length of the  sigmoid colon from the left pelvis down to the urinary bladder. This  appeared to be a combination of adhesions from prior hysterectomy and  recurring diverticulitis. The distal sigmoid colon had more dense  inflammatory change consistent with diverticulitis. As these adhesions  were freed, the entire sigmoid was now mobilized up onto the mesentery. We also carried the dissection up the left colon by incising the  peritoneal reflection.   This

## 2020-11-12 NOTE — PROGRESS NOTES
Haji catheter removed. No complications. Will continue to monitor.  Electronically signed by Baudilio Batista RN on 11/12/2020 at 4:43 PM

## 2020-11-12 NOTE — PROGRESS NOTES
[facility] [phone]                                     Daily Progress Note                                                         Pt Name: Aston Fried  Medical Record Number: [id number]  Date of Birth 1950   Today's Date: 11/12/2020  No chief complaint on file. ASSESSMENT/PLAN  Biliary colic; recurring sigmoid diverticulitis  POD # 1 (11/11) open cholecystectomy, sigmoidectomy with coloproctostomy  -DC fierro  -pain controlled  -OOB, cough and deep breathe, IS, ambulate  -DVT prophy lovenox, SCDs  -PT eval and treat         Discharge Planning: continue inpatient      Noah Thomas has improved from yesterday. Pain is well controlled. She has no nausea and no vomiting. She has not passed flatus and has not had a bowel movement. She is tolerating thin liquids. OBJECTIVE  VITALS:  height is 5' 6\" (1.676 m) and weight is 163 lb 5.8 oz (74.1 kg). Her oral temperature is 99 °F (37.2 °C). Her blood pressure is 117/67 and her pulse is 71. Her respiration is 16 and oxygen saturation is 97%. INTAKE/OUTPUT:    Intake/Output Summary (Last 24 hours) at 11/12/2020 1449  Last data filed at 11/12/2020 7773  Gross per 24 hour   Intake 340 ml   Output 700 ml   Net -360 ml     GENERAL: alert, no distress  LUNGS: clear to ausculation, without wheezes, rales or rhonci  HEART: normal rate and regular rhythm  ABDOMEN: soft, appropriately-tender. Surgical dressing c/d/i. EXTREMITY: no cyanosis, clubbing or edema    I/O last 3 completed shifts:   In: 1440 [P.O.:340; I.V.:1100]  Out: 800 [Urine:700; Blood:100]      LABS  Recent Labs     11/12/20  0640   WBC 12.0*   HGB 11.3*   HCT 34.7*         K 4.8      CO2 22   BUN 16   CREATININE 0.9   CALCIUM 8.1*   AST 14*   ALT 9*   BILITOT <0.2       EDUCATION  Patient educated about Disease Process, Medications, Smoking Cessation, Oxygenation, Incentive Spirometry and Deep Breath and Cough, Diabetes, Hyperlipidemia, Smoking Cessation, Nutrition, Exercise and Hypertension    Electronically signed by BULMARO Adams CNP on 11/12/2020 at 2:44 PM      [facility]   [phone] Office  [phone] Cell     As above  Stable POD 1  OOB  Clears for today    Electronically signed by Bea Harvey MD on 11/12/2020 at 2:51 PM

## 2020-11-12 NOTE — PROGRESS NOTES
Physical Therapy    Facility/Department: [facility]  Initial Assessment    NAME: Mariela Kramer  : 1950  MRN: [id number]    Date of Service: 2020    Discharge Recommendations:  2-3 sessions per week, S Level 1, Home with Home health PT, Home with assist PRN   PT Equipment Recommendations  Equipment Needed: No  Mariela Kramer scored a 17/24 on the AM-PAC short mobility form. Current research shows that an AM-PAC score of 18 or greater is typically associated with a discharge to the patient's home setting. Based on the patient's AM-PAC score and their current functional mobility deficits, it is recommended that the patient have 2-3 sessions per week of Physical Therapy at d/c to increase the patient's independence. At this time, this patient demonstrates the endurance and safety to discharge home with level 1 (home vs OP services) and a follow up treatment frequency of 2-3x/wk. Please see assessment section for further patient specific details. HOME HEALTH CARE: LEVEL 1 STANDARD     -Initial home health evaluation to occur within 24-48 hours, in patient home    -Home health agency to establish plan of care for patient over 60 day period    -Medication Reconciliation    -PCP Visit scheduled within seven days of discharge    -PT/OT to evaluate with goal of regaining prior level of functioning    -OT to evaluate if patient has [address] needs for personal care       If patient discharges prior to next session this note will serve as a discharge summary. Please see below for the latest assessment towards goals. Assessment   Body structures, Functions, Activity limitations: Decreased functional mobility ; Decreased ADL status; Decreased endurance;Decreased high-level IADLs;Decreased balance  Assessment: Pt is a 73 y/o female that reports to hospital s/p surgery for diverticulitis. Prior to admission, Pt was able to perform all ADL's and IADL's independently.  Pt lives in a one story home with her daughter with 4 XAVI. Pt recieved help from daughter as needed who is a home health nurse. Pt able to complete bed mobility SBA this date. Pt able to ambulate ~20 CGA with RW this date. Pt was able to perform sit<-->stand transfers to RW CGA this date. Pt would continue to benefit from skilled PT to improve strength and aerobic capacity to facilitate a safe return home and to PLOF. Treatment Diagnosis: Diverticulitis (Per Dr. Rg Hayes 11/12/2020)  Prognosis: Good  Decision Making: Medium Complexity  History: Thyroid disease, COPD, DVT, HLD. Prior to admission to hospital, Pt was independent with all ADL's and mobility. Pt lives in a one level home with daughter and 4 STEP. Exam: Decreased strength, increased pain  Clinical Presentation: Evolving  PT Education: Goals;PT Role;Transfer Training;Energy Conservation; Adaptive Device Training;General Safety;Gait Training  Patient Education: Pt was educated on how to properly perform log-roll technique. Barriers to Learning: None  REQUIRES PT FOLLOW UP: Yes  Activity Tolerance  Activity Tolerance: Patient limited by fatigue;Patient limited by pain; Patient limited by endurance  Activity Tolerance: Pt limited this afternoon due to pain and decreased aerobic capacity       Patient Diagnosis(es): The encounter diagnosis was Diverticulitis. has a past medical history of Anxiety, Cervical herniated disc, Circulation problem, COPD (chronic obstructive pulmonary disease) (HCC), CRF (chronic renal failure), stage 3 (moderate), Depression, Depression, Diverticulosis of colon, Fibromyalgia, Hx of blood clots, Hyperlipidemia, Joint ache, Kidney failure, Lumbar disc disease, Lumbar disc disease, Migraine, Muscle spasm of back, Pleural effusion, Prolonged emergence from general anesthesia, SVT (supraventricular tachycardia) ([address] Thyroid disease, and Varicose vein. has a past surgical history that includes Hysterectomy; Varicose vein surgery;  Hysterectomy, vaginal; Pain Hand-held shower  Bathroom Accessibility: Walker accessible  Home Equipment: Standard walker, Quad cane  Receives Help From: Family(Daughter)  ADL Assistance: Independent  Homemaking Assistance: Independent  Ambulation Assistance: Independent  Transfer Assistance: Independent  Active : No  Patient's  Info: Pt reports that she can drive, but no longer has a car  Occupation: Retired  Type of occupation: Worked in [address] [place] [address] Ave: Play games, enjoys sitting outside when the weather is nice         Objective     Observation/Palpation  Posture: Fair  Observation: IV in LUE, Haji, 2L supplemental O2    AROM RLE (degrees)  RLE AROM: WFL  RLE General AROM: Assessed with Pt lying supine in bed  AROM LLE (degrees)  LLE AROM : WFL  LLE General AROM: Assessed with Pt lying supine in bed  Strength RLE  Strength RLE: WFL  Comment: Not formally assessed this date  Strength LLE  Strength LLE: WFL  Comment: Not formally assessed this date     Sensation  Overall Sensation Status: WFL(BLE dermatomes in tact to light touch)  Bed mobility  Rolling to Left: Stand by assistance(Pt required the use of hand rails, Cues for proper log roll technique)  Supine to Sit: Stand by assistance  Comment: All bed mobility this afternoon performed in bed in room with HOB elevated 35 degrees and two pillows to Pts left  Transfers  Sit to Stand: Contact guard assistance(Performed bed-->RW and Recliner-->RW)  Stand to sit: Contact guard assistance(Performed RW-->Recliner)  Bed to Chair: Contact guard assistance(CGA with RW)  Comment: Pt required max VC's for proper hand placement with all transfers  Ambulation  Ambulation?: Yes  More Ambulation?: No  Ambulation 1  Surface: level tile  Device: Rolling Walker  Assistance: Contact guard assistance  Quality of Gait: Short steps, step to gait pattern  Gait Deviations: Decreased step height;Decreased step length;Decreased arm swing;Decreased head and trunk rotation  Distance: ~20' in

## 2020-11-12 NOTE — PLAN OF CARE
Nutrition Problem #1: Inadequate oral intake  Intervention: Food and/or Nutrient Delivery: Continue Current Diet, Start Oral Nutrition Supplement  Nutritional Goals: tolerate most appropriate form of nutrition

## 2020-11-12 NOTE — PROGRESS NOTES
Comprehensive Nutrition Assessment    Type and Reason for Visit:  Initial, Positive Nutrition Screen(wt loss, decreased po)    Nutrition Recommendations/Plan:   Add Ensure Clear every tray    Nutrition Assessment:  Pt adm for Biliary Colic & recurring Diverticulitis & is s/p cholecystectomy & sigmoid colectomy on 11/11. PMH includes COPD, CKD (3), Fibromyalgia & HLD. Diet advanced to Clear Liquids post op. Will offer ONS tid to start. Pt noted with a wt loss trend but not at significant rate. Loss likely r/t GI issues. Will monitor trend. Malnutrition Assessment:  Malnutrition Status: At risk for malnutrition (Comment)    Context:  Acute Illness     Findings of the 6 clinical characteristics of malnutrition:  Energy Intake:  1 - 75% or less of estimated energy requirements for 7 or more days  Weight Loss:  No significant weight loss     Body Fat Loss:  Unable to assess     Muscle Mass Loss:  Unable to assess    Fluid Accumulation:  No significant fluid accumulation     Strength:  Not Performed   Due to current CDC guidelines recommending 6-ft distancing for social isolation for COVID19 prevention, NFPE/malnutrition assessment was deferred at this time. Estimated Daily Nutrient Needs:  Energy (kcal):  5500-8294 (22-28 x ABW 74 kg (adj for healing); Weight Used for Energy Requirements:        Protein (g):  59-89 (.8-1.2 x ABW (adj for CKD & healing); Weight Used for Protein Requirements:           Fluid (ml/day):  1 ml per kcal; Method Used for Fluid Requirements:         Nutrition Related Findings:  Noted BM on 11/11.  Noted no edema      Wounds:  Surgical Incision(Abd SI with no issues noted)       Current Nutrition Therapies:    DIET CLEAR LIQUID;  Dietary Nutrition Supplements: Clear Liquid Oral Supplement    Anthropometric Measures:  · Height: 5' 6\" (167.6 cm)  · Current Body Weight: 163 lb (73.9 kg)   · Usual Body Weight: (Noted wt of 175 # 14.8 oz on 8/21/20)     · Ideal Body Weight: 130 lbs; %

## 2020-11-12 NOTE — PLAN OF CARE
Problem: Pain:  Goal: Pain level will decrease  Description: Pain level will decrease  11/12/2020 1224 by Liliam Guan RN  Outcome: Ongoing     Problem: Pain:  Goal: Control of acute pain  Description: Control of acute pain  11/12/2020 1224 by Liliam Guan RN  Outcome: Ongoing     Problem: Pain:  Goal: Control of chronic pain  Description: Control of chronic pain  11/12/2020 1224 by Liliam Guan RN  Outcome: Ongoing     Problem: Nutrition  Goal: Optimal nutrition therapy  Outcome: Ongoing     Problem: Falls - Risk of:  Goal: Will remain free from falls  Description: Will remain free from falls  Outcome: Ongoing  Goal: Absence of physical injury  Description: Absence of physical injury  Outcome: Ongoing

## 2020-11-13 LAB
HCT VFR BLD CALC: 29.5 % (ref 36–48)
HEMOGLOBIN: 9.7 G/DL (ref 12–16)
MCH RBC QN AUTO: 31.4 PG (ref 26–34)
MCHC RBC AUTO-ENTMCNC: 33 G/DL (ref 31–36)
MCV RBC AUTO: 95.2 FL (ref 80–100)
PDW BLD-RTO: 14.1 % (ref 12.4–15.4)
PLATELET # BLD: 133 K/UL (ref 135–450)
PMV BLD AUTO: 9.3 FL (ref 5–10.5)
RBC # BLD: 3.1 M/UL (ref 4–5.2)
WBC # BLD: 8.7 K/UL (ref 4–11)

## 2020-11-13 PROCEDURE — 94760 N-INVAS EAR/PLS OXIMETRY 1: CPT

## 2020-11-13 PROCEDURE — 36415 COLL VENOUS BLD VENIPUNCTURE: CPT

## 2020-11-13 PROCEDURE — 6370000000 HC RX 637 (ALT 250 FOR IP): Performed by: SURGERY

## 2020-11-13 PROCEDURE — 2580000003 HC RX 258: Performed by: SURGERY

## 2020-11-13 PROCEDURE — 97116 GAIT TRAINING THERAPY: CPT | Performed by: PHYSICAL THERAPIST

## 2020-11-13 PROCEDURE — 97530 THERAPEUTIC ACTIVITIES: CPT | Performed by: PHYSICAL THERAPIST

## 2020-11-13 PROCEDURE — 6360000002 HC RX W HCPCS: Performed by: NURSE PRACTITIONER

## 2020-11-13 PROCEDURE — 85027 COMPLETE CBC AUTOMATED: CPT

## 2020-11-13 PROCEDURE — 97110 THERAPEUTIC EXERCISES: CPT | Performed by: PHYSICAL THERAPIST

## 2020-11-13 PROCEDURE — 1200000000 HC SEMI PRIVATE

## 2020-11-13 PROCEDURE — APPNB15 APP NON BILLABLE TIME 0-15 MINS: Performed by: NURSE PRACTITIONER

## 2020-11-13 PROCEDURE — 99024 POSTOP FOLLOW-UP VISIT: CPT | Performed by: SURGERY

## 2020-11-13 PROCEDURE — APPSS15 APP SPLIT SHARED TIME 0-15 MINUTES: Performed by: NURSE PRACTITIONER

## 2020-11-13 RX ORDER — CITALOPRAM 20 MG/1
40 TABLET ORAL NIGHTLY
Status: DISCONTINUED | OUTPATIENT
Start: 2020-11-13 | End: 2020-11-14 | Stop reason: HOSPADM

## 2020-11-13 RX ORDER — TIZANIDINE 4 MG/1
4 TABLET ORAL NIGHTLY
Status: DISCONTINUED | OUTPATIENT
Start: 2020-11-13 | End: 2020-11-14 | Stop reason: HOSPADM

## 2020-11-13 RX ADMIN — GABAPENTIN 600 MG: 300 CAPSULE ORAL at 20:57

## 2020-11-13 RX ADMIN — Medication 10 ML: at 20:57

## 2020-11-13 RX ADMIN — Medication 10 ML: at 08:56

## 2020-11-13 RX ADMIN — ENOXAPARIN SODIUM 40 MG: 40 INJECTION SUBCUTANEOUS at 18:37

## 2020-11-13 RX ADMIN — TRAMADOL HYDROCHLORIDE 50 MG: 50 TABLET, FILM COATED ORAL at 15:54

## 2020-11-13 RX ADMIN — CITALOPRAM HYDROBROMIDE 40 MG: 20 TABLET ORAL at 20:57

## 2020-11-13 RX ADMIN — TRAMADOL HYDROCHLORIDE 50 MG: 50 TABLET, FILM COATED ORAL at 08:55

## 2020-11-13 RX ADMIN — TRAMADOL HYDROCHLORIDE 50 MG: 50 TABLET, FILM COATED ORAL at 21:55

## 2020-11-13 RX ADMIN — LEVOTHYROXINE SODIUM 25 MCG: 0.03 TABLET ORAL at 06:45

## 2020-11-13 RX ADMIN — GABAPENTIN 600 MG: 300 CAPSULE ORAL at 14:16

## 2020-11-13 RX ADMIN — GABAPENTIN 600 MG: 300 CAPSULE ORAL at 08:55

## 2020-11-13 RX ADMIN — TIZANIDINE 4 MG: 4 TABLET ORAL at 20:57

## 2020-11-13 ASSESSMENT — PAIN SCALES - GENERAL
PAINLEVEL_OUTOF10: 9
PAINLEVEL_OUTOF10: 0
PAINLEVEL_OUTOF10: 9
PAINLEVEL_OUTOF10: 7
PAINLEVEL_OUTOF10: 7
PAINLEVEL_OUTOF10: 4

## 2020-11-13 ASSESSMENT — PAIN DESCRIPTION - ORIENTATION
ORIENTATION: MID

## 2020-11-13 ASSESSMENT — PAIN DESCRIPTION - LOCATION
LOCATION: ABDOMEN
LOCATION: ABDOMEN;BACK
LOCATION: ABDOMEN;BACK
LOCATION: BACK;ABDOMEN

## 2020-11-13 ASSESSMENT — PAIN DESCRIPTION - ONSET
ONSET: ON-GOING

## 2020-11-13 ASSESSMENT — PAIN DESCRIPTION - DESCRIPTORS
DESCRIPTORS: SHARP
DESCRIPTORS: DISCOMFORT

## 2020-11-13 ASSESSMENT — PAIN DESCRIPTION - PAIN TYPE
TYPE: SURGICAL PAIN;CHRONIC PAIN
TYPE: SURGICAL PAIN

## 2020-11-13 ASSESSMENT — PAIN DESCRIPTION - FREQUENCY
FREQUENCY: CONTINUOUS

## 2020-11-13 ASSESSMENT — PAIN DESCRIPTION - PROGRESSION
CLINICAL_PROGRESSION: NOT CHANGED

## 2020-11-13 ASSESSMENT — PAIN - FUNCTIONAL ASSESSMENT
PAIN_FUNCTIONAL_ASSESSMENT: PREVENTS OR INTERFERES SOME ACTIVE ACTIVITIES AND ADLS

## 2020-11-13 NOTE — PROGRESS NOTES
Disease Process, Medications, Smoking Cessation, Oxygenation, Incentive Spirometry and Deep Breath and Cough, Diabetes, Hyperlipidemia, Smoking Cessation, Nutrition, Exercise and Hypertension    Electronically signed by BULMARO Martínez - CNP on 11/13/2020 at [address] [facility]   [phone] Office  [phone] Cell     As above  Doing well POD 2 from cholecystectomy and sigmoid colectomy  Pain is reasonable  Increasing activity and diet today  Home soon if bowels work    Electronically signed by Kvng Julio MD on 11/13/2020 at 2:37 PM

## 2020-11-13 NOTE — PROGRESS NOTES
Physical Therapy  Facility/Department: [address] [facility]  Daily Treatment Note  NAME: Kimberly Butts  : 1950  MRN: [id number]    Date of Service: 2020    Discharge Recommendations:  2-3 sessions per week, S Level 1, Home with Home health PT, Home with assist PRN   PT Equipment Recommendations  Equipment Needed: Kaila Butts scored a 21/24 on the AM-PAC short mobility form. Current research shows that an AM-PAC score of 18 or greater is typically associated with a discharge to the patient's home setting. Based on the patient's AM-PAC score and their current functional mobility deficits, it is recommended that the patient have 2-3 sessions per week of Physical Therapy at d/c to increase the patient's independence. At this time, this patient demonstrates the endurance and safety to discharge home with home therapy services and a follow up treatment frequency of 2-3x/wk  or possibly no continued therapy required if patient continues to improve on her own. Please see assessment section for further patient specific details. If patient discharges prior to next session this note will serve as a discharge summary. Please see below for the latest assessment towards goals. Assessment   Body structures, Functions, Activity limitations: Decreased functional mobility ; Decreased ADL status; Decreased endurance;Decreased high-level IADLs;Decreased balance  Assessment: Pt is a 73 y/o female that reports to hospital s/p surgery for diverticulitis. Prior to admission, Pt was able to perform all ADL's and IADL's independently. Pt lives in a one story home with her daughter with 4 XAVI. Pt recieved help from daughter as needed who is a home health nurse. Pt able to complete bed mobility supervision/modified independent this date. Pt able to ambulate 200' with no device and no loss of balance this date. Pt was able to perform sit<-->stand transfers with supervision/modified independence this date.  Patient continues with guarded movements due to pain in abdominal incision. Encouraged patient to move every 1-2 hours to maintain functional mobility upon return to home with family support. Will continue to monitor patient while on acute, but anticipate discharge to home in next 1-2 days, possibly without need for continued therapy vs with home PT 2 days/week for a short time. Treatment Diagnosis: Diverticulitis (Per Dr. Jason Rayo 11/12/2020)  Prognosis: Good  Decision Making: Medium Complexity  History: Thyroid disease, COPD, DVT, HLD. Prior to admission to hospital, Pt was independent with all ADL's and mobility. Pt lives in a one level home with daughter and 4 STEP. Exam: Decreased strength, increased pain  Clinical Presentation: Evolving  PT Education: Goals;PT Role;Transfer Training;Energy Conservation;General Safety;Gait Training  Patient Education: use of pillow to brace lower abdominal incision, need to move regularly and continue with exercise/regular activity upon returning home  Barriers to Learning: None  REQUIRES PT FOLLOW UP: Yes  Activity Tolerance  Activity Tolerance: Patient Tolerated treatment well  Activity Tolerance: patient up moving on her own in her room at end of session without difficulty or imbalance     Patient Diagnosis(es): The encounter diagnosis was Diverticulitis. has a past medical history of Anxiety, Cervical herniated disc, Circulation problem, COPD (chronic obstructive pulmonary disease) (HCC), CRF (chronic renal failure), stage 3 (moderate), Depression, Depression, Diverticulosis of colon, Fibromyalgia, Hx of blood clots, Hyperlipidemia, Joint ache, Kidney failure, Lumbar disc disease, Lumbar disc disease, Migraine, Muscle spasm of back, Pleural effusion, Prolonged emergence from general anesthesia, SVT (supraventricular tachycardia) ([address] Thyroid disease, and Varicose vein. has a past surgical history that includes Hysterectomy; Varicose vein surgery;  Hysterectomy, vaginal; Pain management procedure (Left, 6/12/2020); hernia repair; Colonoscopy; and Small intestine surgery (N/A, 11/11/2020). Restrictions  Restrictions/Precautions  Restrictions/Precautions: Fall Risk  Position Activity Restriction  Other position/activity restrictions: room air, IV and catheter removed  Subjective   General  Chart Reviewed: Yes  Additional Pertinent Hx: \"The patient is a 71-year-old female who has had multiplebouts of diverticulitis. She reports several recent hospitalizationsrequiring IV antibiotics. This has been confirmed on CT scanning aswell. In addition, she was noted to have gallbladder pathology onrecent imaging. This improved with antibiotics, but she also elects toproceed with cholecystectomy in addition to sigmoidectomy today. Therisks, benefits, and alternatives of the surgery were reviewed and sheagreed to proceed. \" (Per Dr. Flores Lack 11/12/2020)  Response To Previous Treatment: Patient with no complaints from previous session. Subjective  Subjective: Pt was supine in bed in room with HOB slightly elevated. Pt c/o abdominal pain especially with coughing and sneezing, but was agreeable to PT. Perfers to ambulate with no device if able as she did previously. Has been ambulating to/from bathroom on her own without an assistive device. Requested to use the bathroom.           Orientation  Orientation  Overall Orientation Status: Within Functional Limits  Cognition   Cognition  Overall Cognitive Status: WFL  Objective   Bed mobility  Supine to Sit: Supervision;Modified independent  Comment: slow and guarded, rolled to R side prior to sitting up to edge of bed with some difficulty due to abdominal pain, remained in recliner at end of session  Transfers  Sit to Stand: Supervision;Modified independent  Stand to sit: Supervision;Modified independent  Bed to Chair: Supervision;Modified independent  Comment: no device, more automatic hand placement - used UEs to brace stomach with transitions, but reached opposite arm back with stand to sit  Ambulation  Ambulation?: Yes  More Ambulation?: No  Ambulation 1  Surface: level tile  Device: No Device  Assistance: Supervision  Quality of Gait: guarded, braced abdomen with arm/hand during functional activities, slow david, but no loss of balance - transitions on/off carpet  Gait Deviations: Decreased step height;Decreased step length;Decreased arm swing;Decreased head and trunk rotation  Distance: 200'  x 2  Stairs/Curb  Stairs?: No(Not safe to perform this date)     Balance  Posture: Fair  Sitting - Static: Good  Sitting - Dynamic: Good  Standing - Static: Good  Standing - Dynamic: Good;-  Comments: progressed ambulation to no device, no loss of balance  Exercises  Hip Flexion: 20, BLEs  Hip Abduction: 20, BLEs, resisted manually; hip add sets x 20  Knee Long Arc Quad: 20, BLEs  Ankle Pumps: 20, BLEs         Other Activities: Other (see comment)  Comment: able to manage safely in bathroom for toilet transfers, perciare after urination and to wash hands, but in hospital gown, so did not observe clothing management                AM-PAC Score  AM-PAC Inpatient Mobility Raw Score : 21 (11/13/20 1325)  AM-PAC Inpatient T-Scale Score : 50.25 (11/13/20 1325)  Mobility Inpatient CMS 0-100% Score: 28.97 (11/13/20 1325)  Mobility Inpatient CMS G-Code Modifier : CJ (11/13/20 1325)          Goals  Short term goals  Time Frame for Short term goals: Prior to discharge  Short term goal 1: Ambulate 50' SBA - met - 11/13/2020  Short term goal 2: Transfer sit<-->stand SBA  - met - 11/13/2020  Short term goal 3: Bed mobility SBA  - met - 11/13/2020  Long term goals  Time Frame for Long term goals : STG=LTG  Patient Goals   Patient goals : \"To get feeling good enough to go home. \"    Plan    Plan  Times per week: 2-3  Times per day: Daily  Current Treatment Recommendations: Strengthening, ROM, Balance Training, Functional Mobility Training, Transfer Training,

## 2020-11-13 NOTE — PROGRESS NOTES
Patients potassium was 3.4. Potassium replaced per protocol. Will continue to monitor.  Electronically signed by Tuan Hood RN on 11/13/2020 at 6:08 AM

## 2020-11-13 NOTE — CARE COORDINATION
INITIAL CASE MANAGEMENT ASSESSMENT    Reviewed chart, met with patient to assess possible discharge needs. Explained Case Management role/services. Living Situation: lives at home with her daughter with 4 steps to enter.     ADLs: Help as needed by daughter.     DME: none reported     PT/OT Recs: Discharge Recommendations:  2-3 sessions per week, S Level 1, Home with Home health PT, Home with assist PRN. Will continue to monitor patient while on acute, but anticipate discharge to home in next 1-2 days, possibly without need for continued therapy vs with home PT 2 days/week for a short time.        Active Services: none     Transportation: family can transport at discharge     Medications: [employer]/No barriers     PCP: Yahaira Collins MD      PLAN/COMMENTS: patient plans to return home at discharge. SW/CM provided contact information for patient or family to call with any questions. SW/CM will follow and assist as needed. Electronically signed by Bobbette Hodgkins, RN on 11/13/2020 at 3:17 PM

## 2020-11-13 NOTE — PLAN OF CARE
Problem: Pain:  Goal: Pain level will decrease  Description: Pain level will decrease  11/13/2020 1036 by Timothy Torrez RN  Outcome: Ongoing     Problem: Pain:  Goal: Control of acute pain  Description: Control of acute pain  11/13/2020 1036 by Timothy Torrez RN  Outcome: Ongoing     Problem: Pain:  Goal: Control of chronic pain  Description: Control of chronic pain  11/13/2020 1036 by Timothy Torrez RN  Outcome: Ongoing     Problem: Nutrition  Goal: Optimal nutrition therapy  11/13/2020 1036 by Timothy Torrez RN  Outcome: Ongoing     Problem: Falls - Risk of:  Goal: Will remain free from falls  Description: Will remain free from falls  11/13/2020 1036 by Timothy Torrez RN  Outcome: Ongoing     Problem: Falls - Risk of:  Goal: Absence of physical injury  Description: Absence of physical injury  11/13/2020 1036 by Timothy Torrez RN  Outcome: Ongoing

## 2020-11-13 NOTE — PLAN OF CARE
Problem: Pain:  Goal: Pain level will decrease  Description: Pain level will decrease  11/12/2020 2334 by Teto Gates RN  Outcome: Ongoing     Problem: Pain:  Goal: Control of acute pain  Description: Control of acute pain  11/12/2020 2334 by Teto Gates RN  Outcome: Ongoing     Problem: Pain:  Goal: Control of chronic pain  Description: Control of chronic pain  11/12/2020 2334 by Teto Gates RN  Outcome: Ongoing     Problem: Nutrition  Goal: Optimal nutrition therapy  11/12/2020 2334 by Teto Gates RN  Outcome: Ongoing     Problem: Falls - Risk of:  Goal: Will remain free from falls  Description: Will remain free from falls  11/12/2020 2334 by Teto Gates RN  Outcome: Ongoing     Problem: Falls - Risk of:  Goal: Absence of physical injury  Description: Absence of physical injury  11/12/2020 2334 by Teto Gates RN  Outcome: Ongoing

## 2020-11-14 VITALS
HEART RATE: 74 BPM | RESPIRATION RATE: 17 BRPM | HEIGHT: 66 IN | SYSTOLIC BLOOD PRESSURE: 134 MMHG | DIASTOLIC BLOOD PRESSURE: 69 MMHG | TEMPERATURE: 98.7 F | BODY MASS INDEX: 26.93 KG/M2 | OXYGEN SATURATION: 94 % | WEIGHT: 167.55 LBS

## 2020-11-14 PROCEDURE — 2580000003 HC RX 258: Performed by: SURGERY

## 2020-11-14 PROCEDURE — 6370000000 HC RX 637 (ALT 250 FOR IP): Performed by: SURGERY

## 2020-11-14 PROCEDURE — 99024 POSTOP FOLLOW-UP VISIT: CPT | Performed by: SURGERY

## 2020-11-14 RX ORDER — TRAMADOL HYDROCHLORIDE 50 MG/1
50 TABLET ORAL EVERY 6 HOURS PRN
Qty: 12 TABLET | Refills: 0 | Status: SHIPPED | OUTPATIENT
Start: 2020-11-14 | End: 2020-11-17

## 2020-11-14 RX ADMIN — GABAPENTIN 600 MG: 300 CAPSULE ORAL at 09:32

## 2020-11-14 RX ADMIN — Medication 10 ML: at 09:33

## 2020-11-14 RX ADMIN — LEVOTHYROXINE SODIUM 25 MCG: 0.03 TABLET ORAL at 07:11

## 2020-11-14 RX ADMIN — TRAMADOL HYDROCHLORIDE 50 MG: 50 TABLET, FILM COATED ORAL at 07:15

## 2020-11-14 ASSESSMENT — PAIN SCALES - GENERAL
PAINLEVEL_OUTOF10: 5
PAINLEVEL_OUTOF10: 7

## 2020-11-14 ASSESSMENT — PAIN DESCRIPTION - LOCATION: LOCATION: BACK;ABDOMEN

## 2020-11-14 ASSESSMENT — PAIN DESCRIPTION - ORIENTATION: ORIENTATION: MID

## 2020-11-14 ASSESSMENT — PAIN DESCRIPTION - PAIN TYPE: TYPE: SURGICAL PAIN

## 2020-11-14 ASSESSMENT — PAIN DESCRIPTION - DESCRIPTORS: DESCRIPTORS: SHARP

## 2020-11-14 NOTE — PROGRESS NOTES
Patient alert and oriented x4, VSS, sitting up in bed. Patient denies n/v, diarrhea, SOB, states lower ABD pain 5/10, prn pain medication given at 0715, patient states pain has improved. Abdominal incision with staples, CDI. Patient denies further needs at this time. Bed in lowest position, non-slip socks on, call light within reach. Will continue to monitor pt needs.

## 2020-11-14 NOTE — PLAN OF CARE
Problem: Pain:  Description: Pain management should include both nonpharmacologic and pharmacologic interventions.   Goal: Pain level will decrease  Description: Pain level will decrease  Outcome: Ongoing  Goal: Control of acute pain  Description: Control of acute pain  11/14/2020 1052 by John Morales RN  Outcome: Ongoing  11/14/2020 0142 by Toy Babinski, RN  Outcome: Ongoing  Goal: Control of chronic pain  Description: Control of chronic pain  Outcome: Ongoing     Problem: Nutrition  Goal: Optimal nutrition therapy  11/14/2020 1052 by John Morales RN  Outcome: Ongoing  11/14/2020 0142 by Toy Babinski, RN  Outcome: Ongoing     Problem: Falls - Risk of:  Goal: Will remain free from falls  Description: Will remain free from falls  11/14/2020 1052 by John Morales RN  Outcome: Ongoing  11/14/2020 0142 by Toy Babinski, RN  Outcome: Ongoing  Goal: Absence of physical injury  Description: Absence of physical injury  Outcome: Ongoing

## 2020-11-14 NOTE — PLAN OF CARE
Problem: Pain:  Description: Pain management should include both nonpharmacologic and pharmacologic interventions.   Goal: Pain level will decrease  Description: Pain level will decrease  11/14/2020 1104 by Gauri Ochoa RN  Outcome: Completed  11/14/2020 1052 by Gauri Ochoa RN  Outcome: Ongoing  Goal: Control of acute pain  Description: Control of acute pain  11/14/2020 1104 by Gauri Ochoa RN  Outcome: Completed  11/14/2020 1052 by Gauri Ochoa RN  Outcome: Ongoing  11/14/2020 0142 by Mario Riggs RN  Outcome: Ongoing  Goal: Control of chronic pain  Description: Control of chronic pain  11/14/2020 1104 by Gauri Ochoa RN  Outcome: Completed  11/14/2020 1052 by Gauri Ochoa RN  Outcome: Ongoing     Problem: Nutrition  Goal: Optimal nutrition therapy  11/14/2020 1104 by Gauri Ochoa RN  Outcome: Completed  11/14/2020 1052 by Gauri Ochoa RN  Outcome: Ongoing  11/14/2020 0142 by Mario Riggs RN  Outcome: Ongoing     Problem: Falls - Risk of:  Goal: Will remain free from falls  Description: Will remain free from falls  11/14/2020 1104 by Gauri Ochoa RN  Outcome: Completed  11/14/2020 1052 by Gauri Ochoa RN  Outcome: Ongoing  11/14/2020 0142 by Mario Riggs RN  Outcome: Ongoing  Goal: Absence of physical injury  Description: Absence of physical injury  11/14/2020 1104 by Gauri Ochoa RN  Outcome: Completed  11/14/2020 1052 by Gauri Ochoa RN  Outcome: Ongoing

## 2020-11-14 NOTE — PROGRESS NOTES
Progress Note  Date:2020       Room:X4Q-4270/4134-01  Patient Name:Talya Julian     YOB: 1950     Age:70 y.o. Subjective    Subjective:  Symptoms:  Improved. Diet:  Adequate intake. Activity level: Returning to normal.       Review of Systems  Objective         Vitals Last 24 Hours:  TEMPERATURE:  Temp  Av.1 °F (37.3 °C)  Min: 98.7 °F (37.1 °C)  Max: 99.5 °F (37.5 °C)  RESPIRATIONS RANGE: Resp  Av.5  Min: 16  Max: 17  PULSE OXIMETRY RANGE: SpO2  Av.4 %  Min: 92 %  Max: 94 %  PULSE RANGE: Pulse  Av  Min: 74  Max: 80  BLOOD PRESSURE RANGE: Systolic (83TIJ), IDN:542 , Min:121 , IQN:567   ; Diastolic (79RQP), UNQ:33, Min:67, Max:84    I/O (24Hr): Intake/Output Summary (Last 24 hours) at 2020 0931  Last data filed at 2020 0522  Gross per 24 hour   Intake 220 ml   Output --   Net 220 ml     Objective  Labs/Imaging/Diagnostics    Labs:  CBC:  Recent Labs     20  0640 20  0600   WBC 12.0* 8.7   RBC 3.65* 3.10*   HGB 11.3* 9.7*   HCT 34.7* 29.5*   MCV 95.1 95.2   RDW 13.8 14.1    133*     CHEMISTRIES:  Recent Labs     20  0640      K 4.8      CO2 22   BUN 16   CREATININE 0.9   GLUCOSE 139*     PT/INR:No results for input(s): PROTIME, INR in the last 72 hours. APTT:No results for input(s): APTT in the last 72 hours. LIVER PROFILE:  Recent Labs     20  0640   AST 14*   ALT 9*   BILITOT <0.2   ALKPHOS 69       Imaging Last 24 Hours:  No results found.   Assessment//Plan           Hospital Problems           Last Modified POA    Sigmoid diverticulitis 2020 Yes    Biliary colic  Yes        Assessment & Plan    Sigmoid diverticulitis and biliary colic   POD 3 from sigmoidectomy and cholecystectomy    Reports she is tolerating PO and had a BM   Wants to go home   Plan discharge today    Electronically signed by Bc Kahn MD on 2020 at 9:32 AM    Electronically signed by Elise Boyer Yue Bynum MD on 11/14/20 at 9:31 AM EST

## 2020-11-14 NOTE — PLAN OF CARE
Problem: Pain:  Goal: Control of acute pain  Description: Control of acute pain  Outcome: Ongoing   Pain/discomfort being managed with PRN analgesics per MD orders. Pt able to express presence and absence of pain and rate pain appropriately using numerical scale. Problem: Nutrition  Goal: Optimal nutrition therapy  Outcome: Ongoing     Problem: Falls - Risk of:  Goal: Will remain free from falls  Description: Will remain free from falls  Outcome: Ongoing  Pt free from falls this shift. Fall precautions in place at all times. Call light always within reach. Pt able and agreeable to contact for safety appropriately.

## 2020-11-16 ENCOUNTER — TELEPHONE (OUTPATIENT)
Dept: FAMILY MEDICINE CLINIC | Age: 70
End: 2020-11-16

## 2020-11-16 ENCOUNTER — TELEPHONE (OUTPATIENT)
Dept: SURGERY | Age: 70
End: 2020-11-16

## 2020-11-16 NOTE — TELEPHONE ENCOUNTER
Patient's daughter Blake Daniel called to say her mom, Bhupinder Combs, has been running a low grade fever after discharge on Saturday. Fever yesterday reached 100.2. Today has been between 99 and 100. Bhupinder Combs is taking Tylenol. Is there anything you would like them to do, or watch out for? Please call Blake Daniel.

## 2020-11-16 NOTE — TELEPHONE ENCOUNTER
Will call pt back to schedule. Leslie 45 Transitions Initial Follow Up Call    Outreach made within 2 business days of discharge: Yes    Patient: Jessica Barber Patient : 1950   MRN: <[id number]>  Reason for Admission: There are no discharge diagnoses documented for the most recent discharge. Discharge Date: 20       Spoke with: PATIENT    Discharge department/facility: [address] Interactive Patient Contact:  Was patient able to fill all prescriptions: Yes  Was patient instructed to bring all medications to the follow-up visit: Yes  Is patient taking all medications as directed in the discharge summary? Yes  Does patient understand their discharge instructions: Yes  Does patient have questions or concerns that need addressed prior to 7-14 day follow up office visit: NO ALREADY SPOKE WITH THE SURGEONS OFFICE .     Scheduled appointment with PCP within 7-14 days    Follow Up  Future Appointments   Date Time Provider Pablo Kelly   2020  9:45 AM Oleg Nieto MD [id number]       Mary Alice Piña MA

## 2020-11-16 NOTE — TELEPHONE ENCOUNTER
Probably okay as long as she's feeling okay otherwise. Daughter reports she is doing okay. She's moving, and drinking. Not much of an appetite. Daughter notified to continue to monitor temp.

## 2020-11-20 NOTE — DISCHARGE SUMMARY
Physician Discharge Summary     Patient ID:  Delmy Tobias  [id number]  1 y.o.  1950    Admit date: 11/11/2020    Discharge date and time: 11/14/2020 12:30 PM     Admitting Physician: Kaitlin Smith MD     Discharge Physician: same    Admission Diagnoses: Diverticulitis [K57.92]  Sigmoid diverticulitis [K57.32]    Discharge Diagnoses: same and biliary colic    Admission Condition: good    Discharged Condition: good    Indication for Admission: surgery    Hospital Course:   Sigmoid diverticulitis and biliary colic              sigmoidectomy and cholecystectomy                  Reports she is tolerating PO and had a BM              Wants to go home              Plan discharge today  Consults: none    Significant Diagnostic Studies:   No orders to display        Discharge Exam:  GENERAL: alert, no distress  LUNGS: clear to ausculation, without wheezes, rales or rhonci  HEART: normal rate and regular rhythm  ABDOMEN: soft, appropriately-tender. Midline incision staples, c/d/i. EXTREMITY: no cyanosis, clubbing or edema    Disposition: home    In process/preliminary results:  Outstanding Order Results     No orders found from 10/13/2020 to 11/12/2020. Patient Instructions:   Discharge Medication List as of 11/14/2020 11:36 AM      START taking these medications    Details   !! traMADol (ULTRAM) 50 MG tablet Take 1 tablet by mouth every 6 hours as needed for Pain for up to 3 days. Intended supply: 3 days. Take lowest dose possible to manage pain, Disp-12 tablet,R-0Print       !! - Potential duplicate medications found. Please discuss with provider. CONTINUE these medications which have NOT CHANGED    Details   gabapentin (NEURONTIN) 600 MG tablet Take 1 tablet by mouth 3 times daily for 90 days. , Disp-270 tablet,R-0Normal      citalopram (CELEXA) 40 MG tablet TAKE 1 TABLET BY MOUTH DAILY, Disp-90 tablet,R-1Normal      acetaminophen (TYLENOL) 500 MG tablet Take 1,000 mg by mouth 2 times dailyHistorical Med      lovastatin (MEVACOR) 40 MG tablet TAKE 1 TABLET BY MOUTH EVERY DAY, Disp-90 tablet,R-0Normal      !! traMADol (ULTRAM) 50 MG tablet Take 1 tablet by mouth 2 times daily as needed for Pain for up to 90 days. , Disp-60 tablet,R-2Normal      tiZANidine (ZANAFLEX) 4 MG tablet as needed for Pain Back painHistorical Med      albuterol (PROVENTIL) (2.5 MG/3ML) 0.083% nebulizer solution Take 3 mLs by nebulization every 6 hours as needed for Wheezing, Disp-120 each,R-0Normal      levothyroxine (SYNTHROID) 25 MCG tablet TAKE 1 TABLET BY MOUTH DAILY, Disp-90 tablet,R-1Normal      dicyclomine (BENTYL) 10 MG capsule Take 1 capsule by mouth 4 times daily as needed (abdominal cramping and diarrhea), Disp-120 capsule, R-1Normal      promethazine (PHENERGAN) 12.5 MG tablet Take 1 tablet by mouth every 8 hours as needed for Nausea TAKE 1 TABLET BY MOUTH EVERY DAY AS NEEDED, Disp-90 tablet, R-0Normal      PROVENTIL  (90 BASE) MCG/ACT inhaler INHALE 2 PUFFS BY MOUTH EVERY 6 HOURS AS NEEDED FOR WHEEZING, Disp-6.7 g, R-1Normal       !! - Potential duplicate medications found. Please discuss with provider. Activity: activity as tolerated, no driving while on analgesics and no heavy lifting for 6 weeks  Diet: low fat low fiber  Wound Care: keep wound clean and dry and as directed    Follow-up with Dr. Shruti Rajput in 2 weeks.     Ochoa CONDON 1:38 PM 11/20/2020   [facility]  Office: [phone]   11/20/2020  1:36 PM

## 2020-11-23 ENCOUNTER — OFFICE VISIT (OUTPATIENT)
Dept: SURGERY | Age: 70
End: 2020-11-23

## 2020-11-23 VITALS — TEMPERATURE: 99.1 F

## 2020-11-23 PROCEDURE — 99024 POSTOP FOLLOW-UP VISIT: CPT | Performed by: SURGERY

## 2020-11-23 NOTE — PROGRESS NOTES
Subjective:      Patient ID: John Del Valle is a 72 y.o. female. HPI  Patient presents s/p cholecystectomy and sigmoidectomy. Patient is two weeks post op. Pain level is minor and improving. Incision appearance: healing well. Post op complications: none. Pathology report reviewed with patient and showed no malignancy. Follow up in one week for wound check and update. Review of Systems    Objective:   Physical Exam    Assessment:       Diagnosis Orders   1.  Diverticulitis     '      Plan:      Follow up in one week        Leesa Barrera MD

## 2020-11-24 ENCOUNTER — TELEPHONE (OUTPATIENT)
Dept: SURGERY | Age: 70
End: 2020-11-24

## 2020-11-24 ENCOUNTER — HOSPITAL ENCOUNTER (EMERGENCY)
Age: 70
Discharge: HOME | End: 2020-11-24
Attending: EMERGENCY MEDICINE
Payer: MEDICARE

## 2020-11-24 VITALS
SYSTOLIC BLOOD PRESSURE: 167 MMHG | BODY MASS INDEX: 26.93 KG/M2 | DIASTOLIC BLOOD PRESSURE: 56 MMHG | WEIGHT: 167.55 LBS | OXYGEN SATURATION: 98 % | TEMPERATURE: 98.9 F | HEART RATE: 80 BPM | RESPIRATION RATE: 18 BRPM | HEIGHT: 66 IN

## 2020-11-24 LAB
ANION GAP SERPL CALCULATED.3IONS-SCNC: 9 MMOL/L (ref 3–16)
BASOPHILS ABSOLUTE: 0 K/UL (ref 0–0.2)
BASOPHILS RELATIVE PERCENT: 0.2 %
BUN BLDV-MCNC: 12 MG/DL (ref 7–20)
CALCIUM SERPL-MCNC: 8.6 MG/DL (ref 8.3–10.6)
CHLORIDE BLD-SCNC: 100 MMOL/L (ref 99–110)
CO2: 27 MMOL/L (ref 21–32)
CREAT SERPL-MCNC: 0.8 MG/DL (ref 0.6–1.2)
EOSINOPHILS ABSOLUTE: 0.3 K/UL (ref 0–0.6)
EOSINOPHILS RELATIVE PERCENT: 2.3 %
GFR AFRICAN AMERICAN: >60
GFR NON-AFRICAN AMERICAN: >60
GLUCOSE BLD-MCNC: 99 MG/DL (ref 70–99)
HCT VFR BLD CALC: 34.9 % (ref 36–48)
HEMOGLOBIN: 11.3 G/DL (ref 12–16)
LYMPHOCYTES ABSOLUTE: 0.9 K/UL (ref 1–5.1)
LYMPHOCYTES RELATIVE PERCENT: 7.9 %
MCH RBC QN AUTO: 30.2 PG (ref 26–34)
MCHC RBC AUTO-ENTMCNC: 32.3 G/DL (ref 31–36)
MCV RBC AUTO: 93.5 FL (ref 80–100)
MONOCYTES ABSOLUTE: 0.6 K/UL (ref 0–1.3)
MONOCYTES RELATIVE PERCENT: 5.6 %
NEUTROPHILS ABSOLUTE: 9.6 K/UL (ref 1.7–7.7)
NEUTROPHILS RELATIVE PERCENT: 84 %
PDW BLD-RTO: 13.8 % (ref 12.4–15.4)
PLATELET # BLD: 297 K/UL (ref 135–450)
PMV BLD AUTO: 9.3 FL (ref 5–10.5)
POTASSIUM REFLEX MAGNESIUM: 3.9 MMOL/L (ref 3.5–5.1)
RBC # BLD: 3.73 M/UL (ref 4–5.2)
SODIUM BLD-SCNC: 136 MMOL/L (ref 136–145)
WBC # BLD: 11.4 K/UL (ref 4–11)

## 2020-11-24 PROCEDURE — 87040 BLOOD CULTURE FOR BACTERIA: CPT

## 2020-11-24 PROCEDURE — 85025 COMPLETE CBC W/AUTO DIFF WBC: CPT

## 2020-11-24 PROCEDURE — 87070 CULTURE OTHR SPECIMN AEROBIC: CPT

## 2020-11-24 PROCEDURE — 99284 EMERGENCY DEPT VISIT MOD MDM: CPT

## 2020-11-24 PROCEDURE — 87205 SMEAR GRAM STAIN: CPT

## 2020-11-24 PROCEDURE — 80048 BASIC METABOLIC PNL TOTAL CA: CPT

## 2020-11-24 RX ORDER — AMOXICILLIN AND CLAVULANATE POTASSIUM 500; 125 MG/1; MG/1
1 TABLET, FILM COATED ORAL 3 TIMES DAILY
Qty: 21 TABLET | Refills: 0 | Status: SHIPPED | OUTPATIENT
Start: 2020-11-24 | End: 2020-12-01

## 2020-11-24 ASSESSMENT — ENCOUNTER SYMPTOMS
VOMITING: 0
BACK PAIN: 0
SORE THROAT: 0
SHORTNESS OF BREATH: 0
EYE REDNESS: 0
NAUSEA: 0
RHINORRHEA: 0

## 2020-11-24 ASSESSMENT — PAIN DESCRIPTION - PAIN TYPE: TYPE: ACUTE PAIN

## 2020-11-24 ASSESSMENT — PAIN - FUNCTIONAL ASSESSMENT: PAIN_FUNCTIONAL_ASSESSMENT: 0-10

## 2020-11-24 ASSESSMENT — PAIN DESCRIPTION - LOCATION: LOCATION: ABDOMEN

## 2020-11-24 ASSESSMENT — PAIN SCALES - GENERAL: PAINLEVEL_OUTOF10: 5

## 2020-11-24 NOTE — ED NOTES
D/C: Order noted for d/c. Pt confirmed d/c paperwork  have correct name. Discharge and education instructions reviewed with patient. Teach-back successful. Pt verbalized understanding and signed d/c papers. Pt denied questions at this time. No acute distress noted. Patient instructed to follow-up as noted - return to emergency department if symptoms worsen. Patient verbalized understanding. Discharged per EDMD with discharge instructions. Pt discharged to private vehicle. Patient stable upon departure. Thanked patient for choosing [facility] for care. Provider aware of patient pain at time of discharge.        Karo Coy RN  11/24/20 [id number]

## 2020-11-24 NOTE — ED PROVIDER NOTES
[facility]  eMERGENCY dEPARTMENT eNCOUnter      Pt Name: Lakeisha Parrish  MRN: [id number]  [place] 1950  Date of evaluation: 11/24/2020  Provider: Lore Cruz MD    CHIEF COMPLAINT       Chief Complaint   Patient presents with    Wound Dehiscence     staples removed yesterday from abd surgery         HISTORY OF PRESENT ILLNESS   (Location/Symptom, Timing/Onset,Context/Setting, Quality, Duration, Modifying Factors, Severity)  Note limiting factors. Lakeisha Parrish is a 72 y.o. female who presents to the emergency department with concern for a wound problem. On 11 November the patient underwent an open cholecystectomy and a sigmoidectomy with coloproctostomy under the care of Dr. Eduard Schmitz. The patient was discharged from the hospital on the 14th. Yesterday the patient was 2 weeks postop and she underwent a wound check. She comes in today stating that since yesterday there is been some redness that is increased around her postoperative wound and this morning she had mostly clear red discharge with some cloudiness in it. She reports her temperatures been slightly elevated over the last 24 hours with a T-max of just under a 100. No cough. No shortness of breath. No other complaints at this time. No change in her postoperative pain. HPI    NursingNotes were reviewed. REVIEW OF SYSTEMS    (2-9 systems for level 4, 10 or more for level 5)     Review of Systems   Constitutional: Positive for fever. Elevated temperature but not over 99.9   HENT: Negative for rhinorrhea and sore throat. Eyes: Negative for redness. Respiratory: Negative for shortness of breath. Cardiovascular: Negative for chest pain. Gastrointestinal: Negative for nausea and vomiting. Postoperative pain is unchanged. Genitourinary: Negative for flank pain. Musculoskeletal: Negative for back pain.    Skin:        Redness around her postoperative wound with discharge. Neurological: Negative for headaches. Hematological: Negative for adenopathy. Psychiatric/Behavioral: Negative for confusion. Except as noted above the remainder of the review of systems was reviewed and negative. PAST MEDICAL HISTORY     Past Medical History:   Diagnosis Date    Anxiety     Cervical herniated disc     Circulation problem     COPD (chronic obstructive pulmonary disease) ([facility] [address]     CRF (chronic renal failure), stage 3 (moderate)     Depression     Depression     Diverticulosis of colon     Fibromyalgia     Hx of blood clots     DVT    Hyperlipidemia     Joint ache     Kidney failure     states begining stage 4 since summer '17    Lumbar disc disease     Lumbar disc disease     Migraine     Muscle spasm of back     Pleural effusion     Prolonged emergence from general anesthesia     SVT (supraventricular tachycardia) (HCC)     Thyroid disease     HYPOTHYROIDISM    Varicose vein          SURGICALHISTORY       Past Surgical History:   Procedure Laterality Date    COLONOSCOPY      HERNIA REPAIR      right inguinal and UMBILICAL-2 SURGERIES    HYSTERECTOMY      HYSTERECTOMY, VAGINAL      PAIN MANAGEMENT PROCEDURE Left 6/12/2020    LUMBAR EPIDURAL STEROID INJECTION LEFT L4-L5 performed by Lane Corona MD at [address] N/A 11/11/2020    CHOLECYSTECTOMY AND SIGMOID COLECTOMY performed by Neo Alexis MD at [facility] Medication List as of 11/24/2020 11:06 AM      CONTINUE these medications which have NOT CHANGED    Details   gabapentin (NEURONTIN) 600 MG tablet Take 1 tablet by mouth 3 times daily for 90 days. , Disp-270 tablet,R-0Normal      citalopram (CELEXA) 40 MG tablet TAKE 1 TABLET BY MOUTH DAILY, Disp-90 tablet,R-1Normal      acetaminophen (TYLENOL) 500 MG tablet Take 1,000 mg by mouth 2 times dailyHistorical Med strain: None    Food insecurity     Worry: None     Inability: None    Transportation needs     Medical: None     Non-medical: None   Tobacco Use    Smoking status: Current Every Day Smoker     Packs/day: 0.25     Years: 45.00     Pack years: 11.25     Types: Cigarettes     Start date: 8/16/2014    Smokeless tobacco: Never Used    Tobacco comment: would like to quit   Substance and Sexual Activity    Alcohol use: No     Alcohol/week: 0.0 standard drinks    Drug use: Never    Sexual activity: Never   Lifestyle    Physical activity     Days per week: None     Minutes per session: None    Stress: None   Relationships    Social connections     Talks on phone: None     Gets together: None     Attends Nondenominational service: None     Active member of club or organization: None     Attends meetings of clubs or organizations: None     Relationship status: None    Intimate partner violence     Fear of current or ex partner: None     Emotionally abused: None     Physically abused: None     Forced sexual activity: None   Other Topics Concern    None   Social History Narrative    None       SCREENINGS             PHYSICAL EXAM    (up to 7 for level 4, 8 or more for level 5)     ED Triage Vitals [11/24/20 0903]   BP Temp Temp Source Pulse Resp SpO2 Height Weight   114/74 98.8 °F (37.1 °C) Oral 93 16 94 % 5' 6\" (1.676 m) 167 lb 8.8 oz (76 kg)       Physical Exam  Vitals signs and nursing note reviewed. Constitutional:       Appearance: She is well-developed. She is not diaphoretic. HENT:      Head: Normocephalic and atraumatic. Eyes:      General: No scleral icterus. Right eye: No discharge. Left eye: No discharge. Conjunctiva/sclera: Conjunctivae normal.   Cardiovascular:      Rate and Rhythm: Normal rate. Pulses: Normal pulses. Heart sounds: No murmur. Pulmonary:      Effort: Pulmonary effort is normal. No respiratory distress. Abdominal:      General: There is no distension. Palpations: Abdomen is soft. Tenderness: There is no guarding or rebound. Comments: Patient has redness around her postoperative wound with some cloudy discharge from her postoperative wound. That was sent for culture. She has appropriate tenderness to palpation postoperatively. No crepitus appreciated. Musculoskeletal: Normal range of motion. Skin:     General: Skin is warm and dry. Capillary Refill: Capillary refill takes less than 2 seconds. Neurological:      Mental Status: She is alert and oriented to person, place, and time.    Psychiatric:         Behavior: Behavior normal.         DIAGNOSTIC RESULTS     EKG: All EKG's are interpreted by the Emergency Department Physician who either signs or Co-signsthis chart in the absence of a cardiologist.        RADIOLOGY:   Flakita Hoots such as CT, Ultrasound and MRI are read by the radiologist. Chriss Najjar radiographic images are visualized and preliminarily interpreted by the emergency physician with the below findings:        Interpretation per the Radiologist below, if available at the time ofthis note:    No orders to display         ED BEDSIDE ULTRASOUND:   Performed by ED Physician - none    LABS:  Labs Reviewed   CBC WITH AUTO DIFFERENTIAL - Abnormal; Notable for the following components:       Result Value    WBC 11.4 (*)     RBC 3.73 (*)     Hemoglobin 11.3 (*)     Hematocrit 34.9 (*)     Neutrophils Absolute 9.6 (*)     Lymphocytes Absolute 0.9 (*)     All other components within normal limits    Narrative:     Performed at:  [address] [address]   Phone [phone]   CULTURE, WOUND    Narrative:     ORDER#: [id number]                          ORDERED BY: Ruba Ferrera  SOURCE: Abdomen                            COLLECTED:  11/24/20 09:17  ANTIBIOTICS AT FUAD.:                      RECEIVED :  11/24/20 09:47  Performed at:  [facility]  [address] [place] [facility] [address]   Phone [phone]   CULTURE, BLOOD 2   CULTURE, BLOOD 1   BASIC METABOLIC PANEL W/ REFLEX TO MG FOR LOW K    Narrative:     Performed at:  [facility]  [address] [place], [place] [address]   Phone [phone]       All other labs were within normal range or not returned as of this dictation. EMERGENCY DEPARTMENT COURSE and DIFFERENTIAL DIAGNOSIS/MDM:   Vitals:    Vitals:    11/24/20 0940 11/24/20 1001 11/24/20 1032 11/24/20 1112   BP: 137/61 (!) 151/61 (!) 148/54 (!) 167/56   Pulse: 79 74  80   Resp:    18   Temp:    98.9 °F (37.2 °C)   TempSrc:    Oral   SpO2: 95% 96% 97% 98%   Weight:       Height:               MDM  Number of Diagnoses or Management Options  Postoperative wound infection:   Diagnosis management comments: DDX: Cellulitis, abscess, seroma, other    Patient was seen and evaluated. I spoke with Dr. Robel Glass who is her surgeon. He plans to come evaluate the patient. He is requested some laboratory testing which has been ordered. CRITICAL CARE TIME   Total Critical Care time was 0 minutes, excluding separately reportable procedures. There was a high probability of clinically significant/life threatening deterioration in the patient's condition which required my urgent intervention. CONSULTS:  None    PROCEDURES:  Unless otherwise noted below, none     Procedures    FINAL IMPRESSION      1. Postoperative wound infection          DISPOSITION/PLAN   DISPOSITION Decision To Discharge 11/24/2020 11:04:03 AM      PATIENT REFERRED TO:  Isabel Mendes MD  [address] [place] [address]      Please follow-up with Dr. Roebl Glass next week as scheduled. Please  the Augmentin that he prescribed you today and started immediately.       DISCHARGE MEDICATIONS:  Discharge Medication List as of 11/24/2020 11:06 AM      START taking these medications    Details   amoxicillin-clavulanate (AUGMENTIN) 500-125 MG per tablet Take 1 tablet by mouth 3 times daily for 7 days, Disp-21 tablet,R-0Normal                (Please note that portions of this note were completed with a voice recognition program.Efforts were made to edit the dictations but occasionally words are mis-transcribed.)    Myles Corona MD (electronically signed)  Attending Emergency Physician          Myles Corona MD  11/25/20 2728

## 2020-11-24 NOTE — ED NOTES
Patient resting comfortably, respirations easy, unlabored. Patient in no acute distress. Denies needs at this time. Call light within reach, bed in lowest position, side rails up x 2.         Deysi Terry RN  11/24/20 8461

## 2020-11-27 LAB
GRAM STAIN RESULT: NORMAL
WOUND/ABSCESS: NORMAL

## 2020-11-28 LAB — CULTURE, BLOOD 2: NORMAL

## 2020-12-01 ENCOUNTER — OFFICE VISIT (OUTPATIENT)
Dept: SURGERY | Age: 70
End: 2020-12-01

## 2020-12-01 PROCEDURE — 99024 POSTOP FOLLOW-UP VISIT: CPT | Performed by: SURGERY

## 2020-12-01 NOTE — PROGRESS NOTES
Subjective:      Patient ID: Mariela Kramer is a 72 y.o. female. HPI  Doing well overall. Seen in ER after last visit with open wound. Now nearly closed. No infection. Follow up in two weeks. Review of Systems    Objective:   Physical Exam    Assessment:       Diagnosis Orders   1.  Diverticulitis             Plan:      Follow up in two weeks        Mike Rushing MD

## 2020-12-09 ENCOUNTER — TELEPHONE (OUTPATIENT)
Dept: SURGERY | Age: 70
End: 2020-12-09

## 2020-12-09 NOTE — TELEPHONE ENCOUNTER
Per [facility] continue to watch and call tomorrow with an update. If she will pain will order a stat CT scan. Pt was notified.

## 2020-12-09 NOTE — TELEPHONE ENCOUNTER
Eduard Peterson says she is having abdominal pain like she had with the diverticulitis and before she had surgery. She has been having this for the last few days. What should she do? She is scheduled to see you on the 15th.

## 2020-12-10 ENCOUNTER — TELEPHONE (OUTPATIENT)
Dept: SURGERY | Age: 70
End: 2020-12-10

## 2020-12-10 NOTE — TELEPHONE ENCOUNTER
Gregoria Murphy called and asked that we tell Dr. Kyung Caballero she has no more pain. (She called about pain yesterday). She says she ate pickles for the first time in years and she thinks that was what was causing her pain. She says she will not eat those again.

## 2020-12-15 ENCOUNTER — OFFICE VISIT (OUTPATIENT)
Dept: SURGERY | Age: 70
End: 2020-12-15

## 2020-12-15 PROCEDURE — 99024 POSTOP FOLLOW-UP VISIT: CPT | Performed by: SURGERY

## 2020-12-15 NOTE — PROGRESS NOTES
Subjective:      Patient ID: Yuli De Paz is a 72 y.o. female. HPI  Patient presents s/p open layla and sigmoid resection. Patient is four weeks post op. Pain level is minor but was worse last week, mostly cramping. Now resolved. Incision appearance: well healed. Post op complications: none. Pathology report reviewed with patient and showed diverticulitis. Follow up in one month. Review of Systems    Objective:   Physical Exam    Assessment:       Diagnosis Orders   1.  Diverticulitis             Plan:      Follow up in one month        19 Krystyna Wright MD

## 2020-12-21 RX ORDER — TRAMADOL HYDROCHLORIDE 50 MG/1
50 TABLET ORAL 2 TIMES DAILY PRN
Qty: 60 TABLET | Refills: 1 | Status: SHIPPED | OUTPATIENT
Start: 2020-12-23 | End: 2021-02-22 | Stop reason: SDUPTHER

## 2021-01-13 RX ORDER — LOVASTATIN 40 MG/1
40 TABLET ORAL NIGHTLY
Qty: 90 TABLET | Refills: 0 | Status: SHIPPED | OUTPATIENT
Start: 2021-01-13 | End: 2021-04-21

## 2021-01-21 RX ORDER — GABAPENTIN 600 MG/1
600 TABLET ORAL 3 TIMES DAILY
Qty: 270 TABLET | Refills: 0 | Status: SHIPPED | OUTPATIENT
Start: 2021-01-21 | End: 2021-04-21

## 2021-02-22 DIAGNOSIS — M47.812 ARTHRITIS OF NECK: ICD-10-CM

## 2021-02-22 DIAGNOSIS — G89.4 CHRONIC PAIN SYNDROME: ICD-10-CM

## 2021-02-22 DIAGNOSIS — M51.9 LUMBAR DISC DISEASE: ICD-10-CM

## 2021-02-22 RX ORDER — TRAMADOL HYDROCHLORIDE 50 MG/1
50 TABLET ORAL 2 TIMES DAILY PRN
Qty: 60 TABLET | Refills: 1 | Status: SHIPPED | OUTPATIENT
Start: 2021-02-22 | End: 2021-04-21

## 2021-03-04 ENCOUNTER — OFFICE VISIT (OUTPATIENT)
Dept: FAMILY MEDICINE CLINIC | Age: 71
End: 2021-03-04
Payer: MEDICARE

## 2021-03-04 VITALS
TEMPERATURE: 97.4 F | OXYGEN SATURATION: 97 % | WEIGHT: 153 LBS | HEART RATE: 78 BPM | DIASTOLIC BLOOD PRESSURE: 74 MMHG | BODY MASS INDEX: 24.69 KG/M2 | RESPIRATION RATE: 16 BRPM | SYSTOLIC BLOOD PRESSURE: 130 MMHG

## 2021-03-04 DIAGNOSIS — M51.9 LUMBAR DISC DISEASE: ICD-10-CM

## 2021-03-04 DIAGNOSIS — M25.511 RIGHT SHOULDER PAIN, UNSPECIFIED CHRONICITY: Primary | ICD-10-CM

## 2021-03-04 DIAGNOSIS — I47.1 SVT (SUPRAVENTRICULAR TACHYCARDIA) (HCC): ICD-10-CM

## 2021-03-04 DIAGNOSIS — J44.9 CHRONIC OBSTRUCTIVE PULMONARY DISEASE, UNSPECIFIED COPD TYPE (HCC): ICD-10-CM

## 2021-03-04 DIAGNOSIS — E03.9 ACQUIRED HYPOTHYROIDISM: ICD-10-CM

## 2021-03-04 DIAGNOSIS — F33.1 MODERATE EPISODE OF RECURRENT MAJOR DEPRESSIVE DISORDER (HCC): ICD-10-CM

## 2021-03-04 PROCEDURE — 3023F SPIROM DOC REV: CPT | Performed by: INTERNAL MEDICINE

## 2021-03-04 PROCEDURE — G8482 FLU IMMUNIZE ORDER/ADMIN: HCPCS | Performed by: INTERNAL MEDICINE

## 2021-03-04 PROCEDURE — 3017F COLORECTAL CA SCREEN DOC REV: CPT | Performed by: INTERNAL MEDICINE

## 2021-03-04 PROCEDURE — G8400 PT W/DXA NO RESULTS DOC: HCPCS | Performed by: INTERNAL MEDICINE

## 2021-03-04 PROCEDURE — 1123F ACP DISCUSS/DSCN MKR DOCD: CPT | Performed by: INTERNAL MEDICINE

## 2021-03-04 PROCEDURE — G8420 CALC BMI NORM PARAMETERS: HCPCS | Performed by: INTERNAL MEDICINE

## 2021-03-04 PROCEDURE — 1090F PRES/ABSN URINE INCON ASSESS: CPT | Performed by: INTERNAL MEDICINE

## 2021-03-04 PROCEDURE — 4040F PNEUMOC VAC/ADMIN/RCVD: CPT | Performed by: INTERNAL MEDICINE

## 2021-03-04 PROCEDURE — 4004F PT TOBACCO SCREEN RCVD TLK: CPT | Performed by: INTERNAL MEDICINE

## 2021-03-04 PROCEDURE — G8427 DOCREV CUR MEDS BY ELIG CLIN: HCPCS | Performed by: INTERNAL MEDICINE

## 2021-03-04 PROCEDURE — G8926 SPIRO NO PERF OR DOC: HCPCS | Performed by: INTERNAL MEDICINE

## 2021-03-04 PROCEDURE — 99214 OFFICE O/P EST MOD 30 MIN: CPT | Performed by: INTERNAL MEDICINE

## 2021-03-04 RX ORDER — BACLOFEN 10 MG/1
10 TABLET ORAL 3 TIMES DAILY PRN
Qty: 90 TABLET | Refills: 0 | Status: SHIPPED | OUTPATIENT
Start: 2021-03-04 | End: 2021-03-29

## 2021-03-04 RX ORDER — PREDNISONE 20 MG/1
TABLET ORAL
Qty: 20 TABLET | Refills: 0 | Status: SHIPPED | OUTPATIENT
Start: 2021-03-04 | End: 2021-12-06

## 2021-03-04 NOTE — PROGRESS NOTES
for up to 60 days. 60 tablet 1    gabapentin (NEURONTIN) 600 MG tablet Take 1 tablet by mouth 3 times daily for 90 days. 270 tablet 0    levothyroxine (SYNTHROID) 25 MCG tablet TAKE 1 TABLET BY MOUTH DAILY 90 tablet 0    lovastatin (MEVACOR) 40 MG tablet Take 1 tablet by mouth nightly TAKE 1 TABLET BY MOUTH EVERY DAY. 90 tablet 0    citalopram (CELEXA) 40 MG tablet TAKE 1 TABLET BY MOUTH DAILY 90 tablet 1    acetaminophen (TYLENOL) 500 MG tablet Take 1,000 mg by mouth 2 times daily      albuterol (PROVENTIL) (2.5 MG/3ML) 0.083% nebulizer solution Take 3 mLs by nebulization every 6 hours as needed for Wheezing 120 each 0    promethazine (PHENERGAN) 12.5 MG tablet Take 1 tablet by mouth every 8 hours as needed for Nausea TAKE 1 TABLET BY MOUTH EVERY DAY AS NEEDED 90 tablet 0    PROVENTIL  (90 BASE) MCG/ACT inhaler INHALE 2 PUFFS BY MOUTH EVERY 6 HOURS AS NEEDED FOR WHEEZING 6.7 g 1    dicyclomine (BENTYL) 10 MG capsule Take 1 capsule by mouth 4 times daily as needed (abdominal cramping and diarrhea) (Patient not taking: Reported on 3/4/2021) 120 capsule 1     No current facility-administered medications for this visit. Social History     Socioeconomic History    Marital status:       Spouse name: Not on file    Number of children: 2    Years of education: Not on file    Highest education level: Not on file   Occupational History    Occupation: NA   Social Needs    Financial resource strain: Not on file    Food insecurity     Worry: Not on file     Inability: Not on file   [employer] needs     Medical: Not on file     Non-medical: Not on file   Tobacco Use    Smoking status: Current Every Day Smoker     Packs/day: 0.25     Years: 45.00     Pack years: 11.25     Types: Cigarettes     Start date: 8/16/2014    Smokeless tobacco: Never Used    Tobacco comment: would like to quit   Substance and Sexual Activity    Alcohol use: No     Alcohol/week: 0.0 standard drinks    Drug use: Never    Sexual activity: Never   Lifestyle    Physical activity     Days per week: Not on file     Minutes per session: Not on file    Stress: Not on file   Relationships    Social connections     Talks on phone: Not on file     Gets together: Not on file     Attends Sikh service: Not on file     Active member of club or organization: Not on file     Attends meetings of clubs or organizations: Not on file     Relationship status: Not on file    Intimate partner violence     Fear of current or ex partner: Not on file     Emotionally abused: Not on file     Physically abused: Not on file     Forced sexual activity: Not on file   Other Topics Concern    Not on file   Social History Narrative    Not on file      Past Medical History:   Diagnosis Date    Anxiety     Cervical herniated disc     Circulation problem     COPD [facility]chronic obstructive [facility] disease) ([facility]     CRF (chronic renal failure), stage 3 (moderate)     Depression     Depression     Diverticulosis of colon     Fibromyalgia     Hx of blood clots     DVT    Hyperlipidemia     Joint ache     Kidney failure     states begining stage 4 since summer '17    Lumbar disc disease     Lumbar disc disease     Migraine     Muscle spasm of back     Pleural effusion     Prolonged emergence from general anesthesia     SVT (supraventricular tachycardia) ([facility]     Thyroid disease     HYPOTHYROIDISM    Varicose vein      Past Surgical History:   Procedure Laterality Date    COLONOSCOPY      HERNIA REPAIR      right inguinal and UMBILICAL-2 SURGERIES    HYSTERECTOMY      HYSTERECTOMY, VAGINAL      PAIN MANAGEMENT PROCEDURE Left 6/12/2020    LUMBAR EPIDURAL STEROID INJECTION LEFT L4-L5 performed by Brianna Alvarado MD at [address] [place] 11/11/2020    CHOLECYSTECTOMY AND SIGMOID COLECTOMY performed by Bhavana Sheriff MD at [address] [facility]         Review of Systems Constitutional: Negative for chills and fever. Respiratory: Negative for cough and shortness of breath. Cardiovascular: Negative for chest pain, palpitations and leg swelling. Gastrointestinal: Negative for abdominal pain, blood in stool, diarrhea, nausea and vomiting. Genitourinary: Negative for dysuria. Musculoskeletal: Positive for arthralgias and back pain. Negative for neck pain. Neurological: Negative for dizziness, light-headedness and headaches. OBJECTIVE:  /74 (Site: Left Upper Arm, Position: Sitting, Cuff Size: Large Adult)   Pulse 78   Temp 97.4 °F (36.3 °C) (Oral)   Resp 16   Wt 153 lb (69.4 kg)   LMP  (LMP Unknown)   SpO2 97%   BMI 24.69 kg/m²      Body mass index is 24.69 kg/m². Physical Exam  Vitals signs and nursing note reviewed. Constitutional:       General: She is not in acute distress. Appearance: Normal appearance. She is well-developed. HENT:      Head: Normocephalic and atraumatic. Nose: Nose normal.      Mouth/Throat:      Pharynx: Oropharynx is clear. Eyes:      Conjunctiva/sclera: Conjunctivae normal.   Neck:      Musculoskeletal: Neck supple. Thyroid: No thyromegaly. Vascular: No carotid bruit. Cardiovascular:      Rate and Rhythm: Normal rate and regular rhythm. Heart sounds: Normal heart sounds. No murmur. No friction rub. No gallop. Pulmonary:      Effort: Pulmonary effort is normal.      Breath sounds: Normal breath sounds. Chest:      Chest wall: No tenderness. Abdominal:      General: Bowel sounds are normal. There is no distension. Palpations: Abdomen is soft. Tenderness: There is no abdominal tenderness. Comments: No hepatosplenomegaly   Musculoskeletal:         General: Tenderness (Low back and upper back, tenderness in right deltoid area) present. Comments: Decreased abduction and posterior rotation of shoulder   Lymphadenopathy:      Cervical: No cervical adenopathy.    Skin: General: Skin is warm and dry. Neurological:      General: No focal deficit present. Mental Status: She is alert. Motor: No abnormal muscle tone. Gait: Gait normal.   Psychiatric:         Behavior: Behavior normal.      Comments: Depressed         ASSESSMENT/PLAN:    Tip was seen today for arm pain. Diagnoses and all orders for this visit:    Right shoulder pain, unspecified chronicity if not better will need to see Ortho  -     predniSONE (DELTASONE) 20 MG tablet; 3 pills daily for 3 days, 2 pills daily for 3 days, 1 pill daily for 3 days and 1/2 daily for 3 days    Lumbar disc disease  -     baclofen (LIORESAL) 10 MG tablet; Take 1 tablet by mouth 3 times daily as needed (back and muscle spasm)    Chronic obstructive pulmonary disease, unspecified COPD type (HCC) stable    SVT (supraventricular tachycardia) (HCC) no change     hypercholesterolemia on Mevacor 40 mg nightly. Has orders to get labs done    Acquired hypothyroidism has orders to get labs done    Moderate episode of recurrent major depressive disorder ([facility] okay on Celexa        Orders Placed This Encounter   Medications    predniSONE (DELTASONE) 20 MG tablet     Sig: 3 pills daily for 3 days, 2 pills daily for 3 days, 1 pill daily for 3 days and 1/2 daily for 3 days     Dispense:  20 tablet     Refill:  0    baclofen (LIORESAL) 10 MG tablet     Sig: Take 1 tablet by mouth 3 times daily as needed (back and muscle spasm)     Dispense:  90 tablet     Refill:  0        Return if symptoms worsen or fail to improve. There are no Patient Instructions on file for this visit.

## 2021-03-09 DIAGNOSIS — D64.9 ANEMIA, UNSPECIFIED TYPE: ICD-10-CM

## 2021-03-09 DIAGNOSIS — E78.00 HYPERCHOLESTEREMIA: ICD-10-CM

## 2021-03-09 DIAGNOSIS — I47.10 SVT (SUPRAVENTRICULAR TACHYCARDIA): ICD-10-CM

## 2021-03-09 DIAGNOSIS — E03.9 ACQUIRED HYPOTHYROIDISM: ICD-10-CM

## 2021-03-09 DIAGNOSIS — E55.9 VITAMIN D DEFICIENCY: ICD-10-CM

## 2021-03-09 DIAGNOSIS — R73.9 HYPERGLYCEMIA: ICD-10-CM

## 2021-03-09 LAB
A/G RATIO: 1.7 (ref 1.1–2.2)
ALBUMIN SERPL-MCNC: 3.8 G/DL (ref 3.4–5)
ALP BLD-CCNC: 98 U/L (ref 40–129)
ALT SERPL-CCNC: 26 U/L (ref 10–40)
ANION GAP SERPL CALCULATED.3IONS-SCNC: 11 MMOL/L (ref 3–16)
AST SERPL-CCNC: 17 U/L (ref 15–37)
BASOPHILS ABSOLUTE: 0 K/UL (ref 0–0.2)
BASOPHILS RELATIVE PERCENT: 0.4 %
BILIRUB SERPL-MCNC: <0.2 MG/DL (ref 0–1)
BUN BLDV-MCNC: 26 MG/DL (ref 7–20)
CALCIUM SERPL-MCNC: 9 MG/DL (ref 8.3–10.6)
CHLORIDE BLD-SCNC: 100 MMOL/L (ref 99–110)
CHOLESTEROL, TOTAL: 217 MG/DL (ref 0–199)
CO2: 29 MMOL/L (ref 21–32)
CREAT SERPL-MCNC: 1.1 MG/DL (ref 0.6–1.2)
EOSINOPHILS ABSOLUTE: 0 K/UL (ref 0–0.6)
EOSINOPHILS RELATIVE PERCENT: 0.2 %
GFR AFRICAN AMERICAN: 59
GFR NON-AFRICAN AMERICAN: 49
GLOBULIN: 2.3 G/DL
GLUCOSE BLD-MCNC: 104 MG/DL (ref 70–99)
HCT VFR BLD CALC: 42.4 % (ref 36–48)
HDLC SERPL-MCNC: 64 MG/DL (ref 40–60)
HEMOGLOBIN: 13.9 G/DL (ref 12–16)
LDL CHOLESTEROL CALCULATED: 130 MG/DL
LYMPHOCYTES ABSOLUTE: 1.6 K/UL (ref 1–5.1)
LYMPHOCYTES RELATIVE PERCENT: 14.4 %
MCH RBC QN AUTO: 30.7 PG (ref 26–34)
MCHC RBC AUTO-ENTMCNC: 32.7 G/DL (ref 31–36)
MCV RBC AUTO: 93.9 FL (ref 80–100)
MONOCYTES ABSOLUTE: 0.7 K/UL (ref 0–1.3)
MONOCYTES RELATIVE PERCENT: 6.1 %
NEUTROPHILS ABSOLUTE: 8.6 K/UL (ref 1.7–7.7)
NEUTROPHILS RELATIVE PERCENT: 78.9 %
PDW BLD-RTO: 15.3 % (ref 12.4–15.4)
PLATELET # BLD: 292 K/UL (ref 135–450)
PMV BLD AUTO: 8.9 FL (ref 5–10.5)
POTASSIUM SERPL-SCNC: 4.3 MMOL/L (ref 3.5–5.1)
RBC # BLD: 4.52 M/UL (ref 4–5.2)
SODIUM BLD-SCNC: 140 MMOL/L (ref 136–145)
TOTAL PROTEIN: 6.1 G/DL (ref 6.4–8.2)
TRIGL SERPL-MCNC: 113 MG/DL (ref 0–150)
TSH SERPL DL<=0.05 MIU/L-ACNC: 1.01 UIU/ML (ref 0.27–4.2)
VITAMIN D 25-HYDROXY: 23.2 NG/ML
VLDLC SERPL CALC-MCNC: 23 MG/DL
WBC # BLD: 10.8 K/UL (ref 4–11)

## 2021-03-10 LAB
ESTIMATED AVERAGE GLUCOSE: 108.3 MG/DL
HBA1C MFR BLD: 5.4 %

## 2021-03-14 ASSESSMENT — ENCOUNTER SYMPTOMS
SHORTNESS OF BREATH: 0
VOMITING: 0
DIARRHEA: 0
BLOOD IN STOOL: 0
COUGH: 0
BACK PAIN: 1
ABDOMINAL PAIN: 0
NAUSEA: 0

## 2021-03-17 ENCOUNTER — TELEPHONE (OUTPATIENT)
Dept: FAMILY MEDICINE CLINIC | Age: 71
End: 2021-03-17

## 2021-03-17 DIAGNOSIS — M25.511 RIGHT SHOULDER PAIN, UNSPECIFIED CHRONICITY: Primary | ICD-10-CM

## 2021-03-29 DIAGNOSIS — M51.9 LUMBAR DISC DISEASE: ICD-10-CM

## 2021-03-29 RX ORDER — BACLOFEN 10 MG/1
TABLET ORAL
Qty: 90 TABLET | Refills: 2 | Status: SHIPPED | OUTPATIENT
Start: 2021-03-29 | End: 2021-12-07

## 2021-03-30 ENCOUNTER — OFFICE VISIT (OUTPATIENT)
Dept: ORTHOPEDIC SURGERY | Age: 71
End: 2021-03-30
Payer: MEDICARE

## 2021-03-30 VITALS — TEMPERATURE: 97.5 F | BODY MASS INDEX: 25.07 KG/M2 | WEIGHT: 156 LBS | HEIGHT: 66 IN

## 2021-03-30 DIAGNOSIS — M25.511 RIGHT SHOULDER PAIN, UNSPECIFIED CHRONICITY: Primary | ICD-10-CM

## 2021-03-30 DIAGNOSIS — R26.89 IMBALANCE: ICD-10-CM

## 2021-03-30 DIAGNOSIS — M75.41 ROTATOR CUFF IMPINGEMENT SYNDROME OF RIGHT SHOULDER: ICD-10-CM

## 2021-03-30 PROCEDURE — 1123F ACP DISCUSS/DSCN MKR DOCD: CPT | Performed by: ORTHOPAEDIC SURGERY

## 2021-03-30 PROCEDURE — 20610 DRAIN/INJ JOINT/BURSA W/O US: CPT | Performed by: ORTHOPAEDIC SURGERY

## 2021-03-30 PROCEDURE — 4040F PNEUMOC VAC/ADMIN/RCVD: CPT | Performed by: ORTHOPAEDIC SURGERY

## 2021-03-30 PROCEDURE — G8482 FLU IMMUNIZE ORDER/ADMIN: HCPCS | Performed by: ORTHOPAEDIC SURGERY

## 2021-03-30 PROCEDURE — G8400 PT W/DXA NO RESULTS DOC: HCPCS | Performed by: ORTHOPAEDIC SURGERY

## 2021-03-30 PROCEDURE — G8417 CALC BMI ABV UP PARAM F/U: HCPCS | Performed by: ORTHOPAEDIC SURGERY

## 2021-03-30 PROCEDURE — G8427 DOCREV CUR MEDS BY ELIG CLIN: HCPCS | Performed by: ORTHOPAEDIC SURGERY

## 2021-03-30 PROCEDURE — 1090F PRES/ABSN URINE INCON ASSESS: CPT | Performed by: ORTHOPAEDIC SURGERY

## 2021-03-30 PROCEDURE — 3017F COLORECTAL CA SCREEN DOC REV: CPT | Performed by: ORTHOPAEDIC SURGERY

## 2021-03-30 PROCEDURE — 4004F PT TOBACCO SCREEN RCVD TLK: CPT | Performed by: ORTHOPAEDIC SURGERY

## 2021-03-30 PROCEDURE — 99204 OFFICE O/P NEW MOD 45 MIN: CPT | Performed by: ORTHOPAEDIC SURGERY

## 2021-03-30 RX ORDER — LIDOCAINE HYDROCHLORIDE 10 MG/ML
4 INJECTION, SOLUTION INFILTRATION; PERINEURAL ONCE
Status: COMPLETED | OUTPATIENT
Start: 2021-03-30 | End: 2021-03-30

## 2021-03-30 RX ORDER — TRIAMCINOLONE ACETONIDE 40 MG/ML
40 INJECTION, SUSPENSION INTRA-ARTICULAR; INTRAMUSCULAR ONCE
Status: COMPLETED | OUTPATIENT
Start: 2021-03-30 | End: 2021-03-30

## 2021-03-30 RX ORDER — BUPIVACAINE HYDROCHLORIDE 2.5 MG/ML
4 INJECTION, SOLUTION INFILTRATION; PERINEURAL ONCE
Status: COMPLETED | OUTPATIENT
Start: 2021-03-30 | End: 2021-03-30

## 2021-03-30 RX ADMIN — LIDOCAINE HYDROCHLORIDE 4 ML: 10 INJECTION, SOLUTION INFILTRATION; PERINEURAL at 11:21

## 2021-03-30 RX ADMIN — BUPIVACAINE HYDROCHLORIDE 10 MG: 2.5 INJECTION, SOLUTION INFILTRATION; PERINEURAL at 11:20

## 2021-03-30 RX ADMIN — TRIAMCINOLONE ACETONIDE 40 MG: 40 INJECTION, SUSPENSION INTRA-ARTICULAR; INTRAMUSCULAR at 11:22

## 2021-03-30 NOTE — PROGRESS NOTES
ORTHOPAEDIC CONSULTATION NOTE    Chief Complaint   Patient presents with    Shoulder Pain     Right       HPI   3/30/2021  80 y.o. female RHD seen in consultation at the request of Soumya Pyle MD for evaluation of right shoulder pain    Onset 3 weeks ago  Injury/trauma - denies  History of symptoms - denies  Pain is located right lateral arm and shoulder   Intermittent radiation into the wrist   Associated with N/T involving MF and RF  Previously tried tylenol and oral steroids    Chronic neck pain  Also poor balance, uses walker at home  smokes 1/2 ppd    I have reviewed and discussed the below pain assessment findings with the patient. Pain Assessment  Location of Pain: Shoulder  Location Modifiers: Right  Severity of Pain: 8  Quality of Pain: Aching  Duration of Pain: Persistent  Frequency of Pain: Constant  Date Pain First Started: (x3 weeks)  Aggravating Factors: Other (Comment)(Raising arm, Pulling, Lifting)  Limiting Behavior: Yes  Relieving Factors: Other (Comment)(Nothing)  Result of Injury: No  Work-Related Injury: No  Are there other pain locations you wish to document?: No      Review of Systems  I have read over the ROS from the Patient History Form dated on 3/30/2021  Pertinent positives include headaches, dizziness, COPD, SOB, hx of VTE, tachycardia, urinary incontinence, CKD stage 3, back and neck pain, chronic pain, fibromyalgia, depression  Rest of 13 point ROS otherwise negative except per HPI, and scanned into the patient's chart under the Media tab.       Allergies   Allergen Reactions    Ciprofloxacin Nausea And Vomiting     SEVERE    Nsaids      Kidney disease    Abilify [Aripiprazole] Itching    Benadryl [Diphenhydramine] Other (See Comments)     COMPLETTELY SEDATES HER-PER DAUGHTER    Darvon [Propoxyphene] Rash    Dilaudid [Hydromorphone Hcl] Itching     CAN TOLERATE 1 DOSE IF NOT GIVEN BACK TO BACK-    Hydrocodone-Acetaminophen Nausea And Vomiting     8/23/2020--pt states that only the hydrodocone-acetaminophen combination causes a reaction, but pt doesn't react to acetaminophen by Guera Chapman, RN    Morphine Itching and Nausea And Vomiting    Percocet [Oxycodone-Acetaminophen] Other (See Comments)     Makes her loopy,HALLUCINATIONS    Trintellix [Vortioxetine] Other (See Comments)     Hands/feet jittery, nausea        Current Outpatient Medications   Medication Sig Dispense Refill    baclofen (LIORESAL) 10 MG tablet TAKE 1 TABLET BY MOUTH THREE TIMES DAILY AS NEEDED FOR MUSCLE SPASM 90 tablet 2    traMADol (ULTRAM) 50 MG tablet Take 1 tablet by mouth 2 times daily as needed for Pain for up to 60 days. 60 tablet 1    gabapentin (NEURONTIN) 600 MG tablet Take 1 tablet by mouth 3 times daily for 90 days. 270 tablet 0    lovastatin (MEVACOR) 40 MG tablet Take 1 tablet by mouth nightly TAKE 1 TABLET BY MOUTH EVERY DAY.  90 tablet 0    citalopram (CELEXA) 40 MG tablet TAKE 1 TABLET BY MOUTH DAILY 90 tablet 1    promethazine (PHENERGAN) 12.5 MG tablet Take 1 tablet by mouth every 8 hours as needed for Nausea TAKE 1 TABLET BY MOUTH EVERY DAY AS NEEDED 90 tablet 0    VITAMIN D PO Take 1,000 Units by mouth daily      predniSONE (DELTASONE) 20 MG tablet 3 pills daily for 3 days, 2 pills daily for 3 days, 1 pill daily for 3 days and 1/2 daily for 3 days (Patient not taking: Reported on 3/30/2021) 20 tablet 0    levothyroxine (SYNTHROID) 25 MCG tablet TAKE 1 TABLET BY MOUTH DAILY (Patient not taking: Reported on 3/30/2021) 90 tablet 0    acetaminophen (TYLENOL) 500 MG tablet Take 1,000 mg by mouth 2 times daily      albuterol (PROVENTIL) (2.5 MG/3ML) 0.083% nebulizer solution Take 3 mLs by nebulization every 6 hours as needed for Wheezing (Patient not taking: Reported on 3/30/2021) 120 each 0    dicyclomine (BENTYL) 10 MG capsule Take 1 capsule by mouth 4 times daily as needed (abdominal cramping and diarrhea) (Patient not taking: Reported on 3/4/2021) 120 capsule 1    PROVENTIL  (90 BASE) MCG/ACT inhaler INHALE 2 PUFFS BY MOUTH EVERY 6 HOURS AS NEEDED FOR WHEEZING (Patient not taking: Reported on 3/30/2021) 6.7 g 1     No current facility-administered medications for this visit. Past Medical History:   Diagnosis Date    Anxiety     Cervical herniated disc     Circulation problem     COPD (chronic obstructive pulmonary disease) (HCC)     CRF (chronic renal failure), stage 3 (moderate)     Depression     Depression     Diverticulosis of colon     Fibromyalgia     Hx of blood clots     DVT    Hyperlipidemia     Joint ache     Kidney failure     states begining stage 4 since summer '17    Lumbar disc disease     Lumbar disc disease     Migraine     Muscle spasm of back     Pleural effusion     Prolonged emergence from general anesthesia     SVT (supraventricular tachycardia) (HCC)     Thyroid disease     HYPOTHYROIDISM    Varicose vein         Past Surgical History:   Procedure Laterality Date    COLONOSCOPY      HERNIA REPAIR      right inguinal and UMBILICAL-2 SURGERIES    HYSTERECTOMY      HYSTERECTOMY, VAGINAL      PAIN MANAGEMENT PROCEDURE Left 6/12/2020    LUMBAR EPIDURAL STEROID INJECTION LEFT L4-L5 performed by Vick Irizarry MD at [address] [place] 11/11/2020    CHOLECYSTECTOMY AND SIGMOID COLECTOMY performed by Karen Jaime MD at [facility]         Family History   Problem Relation Age of Onset    Hypertension Mother     Heart Failure Mother     Cancer Mother         ovarian     Stroke Father     Asthma Father     Heart Failure Brother     Cancer Brother         lung    Diabetes Brother     Heart Disease Brother     Kidney Disease Brother     Other Brother         liver disease       Social History     Socioeconomic History    Marital status:       Spouse name: Not on file    Number of children: 2    Years of education: Not on file    Highest education level: Not on file   Occupational History    Occupation: NA   Social Needs    Financial resource strain: Not on file    Food insecurity     Worry: Not on file     Inability: Not on file   [employer] needs     Medical: Not on file     Non-medical: Not on file   Tobacco Use    Smoking status: Current Every Day Smoker     Packs/day: 0.25     Years: 45.00     Pack years: 11.25     Types: Cigarettes     Start date: 8/16/2014    Smokeless tobacco: Never Used    Tobacco comment: would like to quit   Substance and Sexual Activity    Alcohol use: No     Alcohol/week: 0.0 standard drinks    Drug use: Never    Sexual activity: Never   Lifestyle    Physical activity     Days per week: Not on file     Minutes per session: Not on file    Stress: Not on file   Relationships    Social connections     Talks on phone: Not on file     Gets together: Not on file     Attends Anabaptist service: Not on file     Active member of club or organization: Not on file     Attends meetings of clubs or organizations: Not on file     Relationship status: Not on file    Intimate partner violence     Fear of current or ex partner: Not on file     Emotionally abused: Not on file     Physically abused: Not on file     Forced sexual activity: Not on file   Other Topics Concern    Not on file   Social History Narrative    Not on file        Vitals:    03/30/21 0926   Temp: 97.5 °F (36.4 °C)   TempSrc: Infrared   Weight: 156 lb (70.8 kg)   Height: 5' 6\" (1.676 m)       Physical Exam  Constitutional - well-nourished, Body mass index is 25.18 kg/m².    Here with family  Psychiatric - flat mood & affect, tired/lethargic appearing  Cardiovascular - RRR, negative UE peripheral edema, radial pulse 2+  Respiratory - respirations unlabored, on room air; mask on  Skin - no rashes, wounds, or lesions seen on exposed skin  Neck - moderately decreased cervical ROM, no TTP of spinous processes, negative Spurling's  Neurological - sways when standing up   Can't maintain balance with eyes closed   AUSTENMARJORIE FLACA M/U/R/A nerve distributions; AIN/PIN/IO intact  Right shoulder:   No obvious deformity/swelling/ecchymosis   moderate TTP over bicipital groove    Range of Motion: Forward flexion/scaption:  active 70, passive 110    External rotation with arm at side:  60    Internal rotation:  L4   Special tests:    negative lift-off sign    equivocal belly-press test    positive Hawkin's test        Imaging:  Images were personally reviewed by myself and discussed with the patient  Right shoulder 4 views performed today in clinic   - glenohumeral articulation is within normal limits with no evidence of subchondral cystic changes or osteophytes, there are no loose bodies appreciated. Jacky's line is preserved. On axillary view, the humeral head is well-centered within the glenoid. The acromioclavicular joint demonstrates mild degenerative changes. There are cystic changes involving the tuberosities. Calcific tendonitis is absent. Acromion is down sloping with elevated critical shoulder angle. Assessment & Plan:  80 y.o. female who presents with    Diagnosis Orders   1. Right shoulder pain, unspecified chronicity  XR SHOULDER RIGHT (MIN 2 VIEWS)    NV ARTHROCENTESIS ASPIR&/INJ MAJOR JT/BURSA W/O US    bupivacaine (MARCAINE) 0.25 % injection 10 mg    lidocaine 1 % injection 4 mL    triamcinolone acetonide (KENALOG-40) injection 40 mg   2. Rotator cuff impingement syndrome of right shoulder  [facility]    possible tear   3.  [facility]    [facility] Physical [facility]           Procedures    NV ARTHROCENTESIS ASPIR&/INJ MAJOR JT/BURSA W/O US       Thank you Dr Navarro Joyner for referring Jorge Walter to me for evaluation of her right shoulder:    I have recommended a course of conservative treatment, including activity modification, proper lifting technique, proper sleeping position, ice, NSAIDs and/or tylenol, and a home exercise program.    I believe the patient will benefit from formal physical therapy, therefore, a referral was placed, focusing on ROM, cuff strengthening, periscapular strengthening, and modalities. Risks and benefits of a steroid injection were discussed with the patient, including the possibility of adverse local site reactions such as dermal atrophy and skin discoloration. Although rare, an infection is possible and may necessitate surgical treatment if it occurs in a joint or develops into an abscess. Finally, a rise in blood sugar levels is anticipated, particularly in diabetics. Diabetic patients were instructed to monitor their blood glucose levels after the injection and to adjust their insulin regimen as appropriate. The patient elected to proceed, and after verbal consent was obtained and drug allergies were reviewed, the injection site was prepped with alcohol and ChloraPrep. 40mg of Kenalog mixed with lidocaine and marcaine (no epinephrine) was injected into the right shoulder subacromial space. There were no immediate complications after the procedure. The patient was advised to ice the area intermittently over the next 24-48 hours until the corticosteroid becomes effective.      I am more concerned today regarding her balance issues, and hx of falls   I have recommended strict walker use, fall precautions, and neurology work up    Mitchell Astridkeo

## 2021-04-21 DIAGNOSIS — M51.9 LUMBAR DISC DISEASE: ICD-10-CM

## 2021-04-21 DIAGNOSIS — F33.0 MILD EPISODE OF RECURRENT MAJOR DEPRESSIVE DISORDER (HCC): ICD-10-CM

## 2021-04-21 DIAGNOSIS — M47.812 ARTHRITIS OF NECK: ICD-10-CM

## 2021-04-21 DIAGNOSIS — G89.4 CHRONIC PAIN SYNDROME: ICD-10-CM

## 2021-04-21 RX ORDER — GABAPENTIN 600 MG/1
600 TABLET ORAL 3 TIMES DAILY
Qty: 270 TABLET | Refills: 0 | Status: SHIPPED | OUTPATIENT
Start: 2021-04-21 | End: 2022-02-01

## 2021-04-21 RX ORDER — TRAMADOL HYDROCHLORIDE 50 MG/1
50 TABLET ORAL 2 TIMES DAILY PRN
Qty: 60 TABLET | Refills: 2 | Status: SHIPPED | OUTPATIENT
Start: 2021-04-21 | End: 2021-07-22

## 2021-04-21 RX ORDER — LOVASTATIN 40 MG/1
TABLET ORAL
Qty: 90 TABLET | Refills: 1 | Status: SHIPPED | OUTPATIENT
Start: 2021-04-21 | End: 2021-11-10

## 2021-04-21 RX ORDER — LEVOTHYROXINE SODIUM 0.03 MG/1
25 TABLET ORAL DAILY
Qty: 90 TABLET | Refills: 1 | Status: SHIPPED | OUTPATIENT
Start: 2021-04-21 | End: 2021-10-18

## 2021-04-21 RX ORDER — CITALOPRAM 40 MG/1
TABLET ORAL
Qty: 90 TABLET | Refills: 1 | Status: SHIPPED | OUTPATIENT
Start: 2021-04-21 | End: 2021-11-10

## 2021-07-22 DIAGNOSIS — M51.9 LUMBAR DISC DISEASE: ICD-10-CM

## 2021-07-22 DIAGNOSIS — M47.812 ARTHRITIS OF NECK: ICD-10-CM

## 2021-07-22 DIAGNOSIS — G89.4 CHRONIC PAIN SYNDROME: ICD-10-CM

## 2021-07-22 RX ORDER — TRAMADOL HYDROCHLORIDE 50 MG/1
50 TABLET ORAL 2 TIMES DAILY PRN
Qty: 60 TABLET | Refills: 1 | Status: SHIPPED | OUTPATIENT
Start: 2021-07-23 | End: 2021-09-21

## 2021-09-20 ENCOUNTER — TELEPHONE (OUTPATIENT)
Dept: FAMILY MEDICINE CLINIC | Age: 71
End: 2021-09-20

## 2021-09-20 DIAGNOSIS — Z20.822 CLOSE EXPOSURE TO COVID-19 VIRUS: Primary | ICD-10-CM

## 2021-09-22 NOTE — TELEPHONE ENCOUNTER
Called the pt she doesn't drive and will probably have done somewhere here in [facility]. Pt will call with a fax # if we need to fax the order.

## 2021-10-18 RX ORDER — LEVOTHYROXINE SODIUM 0.03 MG/1
25 TABLET ORAL DAILY
Qty: 90 TABLET | Refills: 1 | Status: SHIPPED | OUTPATIENT
Start: 2021-10-18 | End: 2022-04-18

## 2021-10-19 ENCOUNTER — OFFICE VISIT (OUTPATIENT)
Dept: FAMILY MEDICINE CLINIC | Age: 71
End: 2021-10-19
Payer: MEDICARE

## 2021-10-19 VITALS
WEIGHT: 159.6 LBS | SYSTOLIC BLOOD PRESSURE: 126 MMHG | HEART RATE: 88 BPM | HEIGHT: 66 IN | OXYGEN SATURATION: 97 % | BODY MASS INDEX: 25.65 KG/M2 | TEMPERATURE: 98.4 F | DIASTOLIC BLOOD PRESSURE: 77 MMHG

## 2021-10-19 DIAGNOSIS — J44.9 CHRONIC OBSTRUCTIVE PULMONARY DISEASE, UNSPECIFIED COPD TYPE (HCC): ICD-10-CM

## 2021-10-19 DIAGNOSIS — M25.551 RIGHT HIP PAIN: ICD-10-CM

## 2021-10-19 DIAGNOSIS — E04.1 THYROID NODULE: ICD-10-CM

## 2021-10-19 DIAGNOSIS — E78.2 MIXED HYPERLIPIDEMIA: ICD-10-CM

## 2021-10-19 DIAGNOSIS — Z00.00 ROUTINE GENERAL MEDICAL EXAMINATION AT A HEALTH CARE FACILITY: Primary | ICD-10-CM

## 2021-10-19 DIAGNOSIS — E03.9 ACQUIRED HYPOTHYROIDISM: ICD-10-CM

## 2021-10-19 DIAGNOSIS — Z12.31 ENCOUNTER FOR SCREENING MAMMOGRAM FOR MALIGNANT NEOPLASM OF BREAST: ICD-10-CM

## 2021-10-19 DIAGNOSIS — Z83.49 FAMILY HISTORY OF 5,10-METHYLENETETRAHYDROFOLATE REDUCTASE (MTHFR) DEFICIENCY: ICD-10-CM

## 2021-10-19 PROCEDURE — 90732 PPSV23 VACC 2 YRS+ SUBQ/IM: CPT | Performed by: INTERNAL MEDICINE

## 2021-10-19 PROCEDURE — 3017F COLORECTAL CA SCREEN DOC REV: CPT | Performed by: INTERNAL MEDICINE

## 2021-10-19 PROCEDURE — G0438 PPPS, INITIAL VISIT: HCPCS | Performed by: INTERNAL MEDICINE

## 2021-10-19 PROCEDURE — 1123F ACP DISCUSS/DSCN MKR DOCD: CPT | Performed by: INTERNAL MEDICINE

## 2021-10-19 PROCEDURE — G0008 ADMIN INFLUENZA VIRUS VAC: HCPCS | Performed by: INTERNAL MEDICINE

## 2021-10-19 PROCEDURE — G0009 ADMIN PNEUMOCOCCAL VACCINE: HCPCS | Performed by: INTERNAL MEDICINE

## 2021-10-19 PROCEDURE — 4040F PNEUMOC VAC/ADMIN/RCVD: CPT | Performed by: INTERNAL MEDICINE

## 2021-10-19 PROCEDURE — 90694 VACC AIIV4 NO PRSRV 0.5ML IM: CPT | Performed by: INTERNAL MEDICINE

## 2021-10-19 RX ORDER — ZOSTER VACCINE RECOMBINANT, ADJUVANTED 50 MCG/0.5
0.5 KIT INTRAMUSCULAR SEE ADMIN INSTRUCTIONS
Qty: 0.5 ML | Refills: 0 | Status: SHIPPED | OUTPATIENT
Start: 2021-10-19 | End: 2022-04-17

## 2021-10-19 RX ORDER — MELATONIN
1000 DAILY
Qty: 30 TABLET | Refills: 5 | Status: SHIPPED | OUTPATIENT
Start: 2021-10-19 | End: 2022-04-18

## 2021-10-19 SDOH — ECONOMIC STABILITY: TRANSPORTATION INSECURITY
IN THE PAST 12 MONTHS, HAS THE LACK OF TRANSPORTATION KEPT YOU FROM MEDICAL APPOINTMENTS OR FROM GETTING MEDICATIONS?: NO

## 2021-10-19 SDOH — ECONOMIC STABILITY: TRANSPORTATION INSECURITY
IN THE PAST 12 MONTHS, HAS LACK OF TRANSPORTATION KEPT YOU FROM MEETINGS, WORK, OR FROM GETTING THINGS NEEDED FOR DAILY LIVING?: NO

## 2021-10-19 SDOH — ECONOMIC STABILITY: FOOD INSECURITY: WITHIN THE PAST 12 MONTHS, YOU WORRIED THAT YOUR FOOD WOULD RUN OUT BEFORE YOU GOT MONEY TO BUY MORE.: NEVER TRUE

## 2021-10-19 SDOH — ECONOMIC STABILITY: FOOD INSECURITY: WITHIN THE PAST 12 MONTHS, THE FOOD YOU BOUGHT JUST DIDN'T LAST AND YOU DIDN'T HAVE MONEY TO GET MORE.: NEVER TRUE

## 2021-10-19 ASSESSMENT — ENCOUNTER SYMPTOMS
DIARRHEA: 0
CONSTIPATION: 0
ABDOMINAL PAIN: 0
BACK PAIN: 1
VOMITING: 0
NAUSEA: 0
VOICE CHANGE: 0
STRIDOR: 0
BLOOD IN STOOL: 0
WHEEZING: 0
SINUS PRESSURE: 0
ROS SKIN COMMENTS: NO CONCERNING SKIN LESIONS
COUGH: 1

## 2021-10-19 ASSESSMENT — LIFESTYLE VARIABLES
HOW OFTEN DO YOU HAVE A DRINK CONTAINING ALCOHOL: NEVER
HOW OFTEN DO YOU HAVE A DRINK CONTAINING ALCOHOL: 0
AUDIT TOTAL SCORE: INCOMPLETE
AUDIT-C TOTAL SCORE: INCOMPLETE

## 2021-10-19 ASSESSMENT — PATIENT HEALTH QUESTIONNAIRE - PHQ9
SUM OF ALL RESPONSES TO PHQ QUESTIONS 1-9: 1
SUM OF ALL RESPONSES TO PHQ9 QUESTIONS 1 & 2: 1
SUM OF ALL RESPONSES TO PHQ QUESTIONS 1-9: 1
SUM OF ALL RESPONSES TO PHQ QUESTIONS 1-9: 1
1. LITTLE INTEREST OR PLEASURE IN DOING THINGS: 1
2. FEELING DOWN, DEPRESSED OR HOPELESS: 0

## 2021-10-19 ASSESSMENT — SOCIAL DETERMINANTS OF HEALTH (SDOH): HOW HARD IS IT FOR YOU TO PAY FOR THE VERY BASICS LIKE FOOD, HOUSING, MEDICAL CARE, AND HEATING?: NOT HARD AT ALL

## 2021-10-19 NOTE — PATIENT INSTRUCTIONS
Personalized Preventive Plan for Abdi Drummond - 10/19/2021  Medicare offers a range of preventive health benefits. Some of the tests and screenings are paid in full while other may be subject to a deductible, co-insurance, and/or copay. Some of these benefits include a comprehensive review of your medical history including lifestyle, illnesses that may run in your family, and various assessments and screenings as appropriate. After reviewing your medical record and screening and assessments performed today your provider may have ordered immunizations, labs, imaging, and/or referrals for you. A list of these orders (if applicable) as well as your Preventive Care list are included within your After Visit Summary for your review. Other Preventive Recommendations:    · A preventive eye exam performed by an eye specialist is recommended every 1-2 years to screen for glaucoma; cataracts, macular degeneration, and other eye disorders. · A preventive dental visit is recommended every 6 months. · Try to get at least 150 minutes of exercise per week or 10,000 steps per day on a pedometer . · Order or download the FREE \"Exercise & Physical Activity: Your Everyday Guide\" from [employer]. Call 4-476.352.6107 or search [employer] online. · You need 5092-8174 mg of calcium and 0128-5068 IU of vitamin D per day. It is possible to meet your calcium requirement with diet alone, but a vitamin D supplement is usually necessary to meet this goal.  · When exposed to the sun, use a sunscreen that protects against both UVA and UVB radiation with an SPF of 30 or greater. Reapply every 2 to 3 hours or after sweating, drying off with a towel, or swimming. · Always wear a seat belt when traveling in a car. Always wear a helmet when riding a bicycle or motorcycle. Patient Education        Well Visit, Over 72: Care Instructions  Overview     Well visits can help you stay healthy.  Your doctor has checked your overall health and may have suggested ways to take good care of yourself. Your doctor also may have recommended tests. At home, you can help prevent illness with healthy eating, regular exercise, and other steps. Follow-up care is a key part of your treatment and safety. Be sure to make and go to all appointments, and call your doctor if you are having problems. It's also a good idea to know your test results and keep a list of the medicines you take. How can you care for yourself at home? Get screening tests that you and your doctor decide on. Screening helps find diseases before any symptoms appear. Eat healthy foods. Choose fruits, vegetables, whole grains, protein, and low-fat dairy foods. Limit fat, especially saturated fat. Reduce salt in your diet. Limit alcohol. If you are a man, have no more than 2 drinks a day or 14 drinks a week. If you are a woman, have no more than 1 drink a day or 7 drinks a week. Since alcohol affects older adults differently, you may want to limit alcohol even more. Or you may not want to drink at all. Get at least 30 minutes of exercise on most days of the week. Walking is a good choice. You also may want to do other activities, such as running, swimming, cycling, or playing tennis or team sports. Reach and stay at a healthy weight. This will lower your risk for many problems, such as obesity, diabetes, heart disease, and high blood pressure. Do not smoke. Smoking can make health problems worse. If you need help quitting, talk to your doctor about stop-smoking programs and medicines. These can increase your chances of quitting for good. Care for your mental health. It is easy to get weighed down by worry and stress. Learn strategies to manage stress, like deep breathing and mindfulness, and stay connected with your family and community. If you find you often feel sad or hopeless, talk with your doctor. Treatment can help.   Talk to your doctor about whether you have any risk factors for sexually transmitted infections (STIs). You can help prevent STIs if you wait to have sex with a new partner (or partners) until you've each been tested for STIs. It also helps if you use condoms (male or female condoms) and if you limit your sex partners to one person who only has sex with you. Vaccines are available for some STIs. If you think you may have a problem with alcohol or drug use, talk to your doctor. This includes prescription medicines (such as amphetamines and opioids) and illegal drugs (such as cocaine and methamphetamine). Your doctor can help you figure out what type of treatment is best for you. Protect your skin from too much sun. When you're outdoors from 10 a.m. to 4 p.m., stay in the shade or cover up with clothing and a hat with a wide brim. Wear sunglasses that block UV rays. Even when it's cloudy, put broad-spectrum sunscreen (SPF 30 or higher) on any exposed skin. See a dentist one or two times a year for checkups and to have your teeth cleaned. Wear a seat belt in the car. When should you call for help? Watch closely for changes in your health, and be sure to contact your doctor if you have any problems or symptoms that concern you. Where can you learn more? Go to https://[employer] Hel[employer] Handsjosseb.health-partners. org and sign in to your [employer] account. Enter C259 in the [facility] box to learn more about \"Well Visit, Over 65: Care Instructions. \"     If you do not have an account, please click on the \"Sign Up Now\" link. Current as of: February 11, 2021               Content Version: 13.0  © 8611-5121 Healthwise, [employer]. Care instructions adapted under license by [facility] ([facility]). If you have questions about a medical condition or this instruction, always ask your healthcare professional. [place] [address] any warranty or liability for your use of this information.          Patient Education        Earwax Blockage: Care Instructions  Your Care Instructions     Earwax is a natural substance that protects the ear canal. Normally, earwax drains from the ears and does not cause problems. Sometimes earwax builds up and hardens. Earwax blockage (also called cerumen impaction) can cause some loss of hearing and pain. When wax is tightly packed, you will need to have your doctor remove it. Follow-up care is a key part of your treatment and safety. Be sure to make and go to all appointments, and call your doctor if you are having problems. It's also a good idea to know your test results and keep a list of the medicines you take. How can you care for yourself at home? Do not try to remove earwax with cotton swabs, fingers, or other objects. This can make the blockage worse and damage the eardrum. If your doctor recommends that you try to remove earwax at home:  Soften and loosen the earwax with warm mineral oil. You also can try hydrogen peroxide mixed with an equal amount of room temperature water. Place 2 drops of the fluid, warmed to body temperature, in the ear two times a day for up to 5 days. Once the wax is loose and soft, all that is usually needed to remove it from the ear canal is a gentle, warm shower. Direct the water into the ear, then tip your head to let the earwax drain out. Dry your ear thoroughly with a hair dryer set on low. Hold the dryer several inches from your ear. If the warm mineral oil and shower do not work, use an over-the-counter wax softener. Read and follow all instructions on the label. After using the wax softener, use an ear syringe to gently flush the ear. Make sure the flushing solution is body temperature. Cool or hot fluids in the ear can cause dizziness. When should you call for help? Call your doctor now or seek immediate medical care if:    Pus or blood drains from your ear.     Your ears are ringing or feel full.     You have a loss of hearing.    Watch closely for changes in your health, and be sure to contact your doctor if:    You have pain or reduced hearing after 1 week of home treatment.     You have any new symptoms, such as nausea or balance problems. Where can you learn more? Go to https://[employer] ServicespemedinakoPartnerbyte.blueKiwi. org and sign in to your [employer] [facility] account. Enter W384 in the [facility] box to learn more about \"Earwax Blockage: Care Instructions. \"     If you do not have an account, please click on the \"Sign Up Now\" link. Current as of: July 1, 2021               Content Version: 13.0  © 8921-8227 [employer]. Care instructions adapted under license by [facility] ([facility]). If you have questions about a medical condition or this instruction, always ask your healthcare professional. [address] any warranty or liability for your use of this information. Patient Education        Earwax Blockage: Care Instructions  Your Care Instructions     Earwax is a natural substance that protects the ear canal. Normally, earwax drains from the ears and does not cause problems. Sometimes earwax builds up and hardens. Earwax blockage (also called cerumen impaction) can cause some loss of hearing and pain. When wax is tightly packed, you will need to have your doctor remove it. Follow-up care is a key part of your treatment and safety. Be sure to make and go to all appointments, and call your doctor if you are having problems. It's also a good idea to know your test results and keep a list of the medicines you take. How can you care for yourself at home? Do not try to remove earwax with cotton swabs, fingers, or other objects. This can make the blockage worse and damage the eardrum. If your doctor recommends that you try to remove earwax at home:  Soften and loosen the earwax with warm mineral oil. You also can try hydrogen peroxide mixed with an equal amount of room temperature water.  Place 2 drops of the fluid, warmed to body temperature, in the ear two times a day for up to 5 days. Once the wax is loose and soft, all that is usually needed to remove it from the ear canal is a gentle, warm shower. Direct the water into the ear, then tip your head to let the earwax drain out. Dry your ear thoroughly with a hair dryer set on low. Hold the dryer several inches from your ear. If the warm mineral oil and shower do not work, use an over-the-counter wax softener. Read and follow all instructions on the label. After using the wax softener, use an ear syringe to gently flush the ear. Make sure the flushing solution is body temperature. Cool or hot fluids in the ear can cause dizziness. When should you call for help? Call your doctor now or seek immediate medical care if:    Pus or blood drains from your ear.     Your ears are ringing or feel full.     You have a loss of hearing. Watch closely for changes in your health, and be sure to contact your doctor if:    You have pain or reduced hearing after 1 week of home treatment.     You have any new symptoms, such as nausea or balance problems. Where can you learn more? Go to https://Beech Tree LabspeTelormedix.[employer]. org and sign in to your [employer] account. Enter D728 in the [facility] box to learn more about \"Earwax Blockage: Care Instructions. \"     If you do not have an account, please click on the \"Sign Up Now\" link. Current as of: July 1, 2021               Content Version: 13.0  © 2006-2021 Healthwise, [facility]. Care instructions adapted under license by [facility] ([facility]). If you have questions about a medical condition or this instruction, always ask your healthcare professional. [place] [address] any warranty or liability for your use of this information.

## 2021-10-19 NOTE — PROGRESS NOTES
SUBJECTIVE:  Terry Arboleda is a 80 y.o. female being evaluated for:    Chief Complaint   Patient presents with   [facility]      Patient is here for medicare wellness and medication refill.     Medication Refill       HPI   More pain and stiffness in her hands  Saw Dr Yakov Mueller and he gave her a shot for her shoulder and since then then it has been doing great  Full rom Chronic upper back pain remains the same   More trouble walking  Pain in her right hip  Pain across low back     Hearing problems  Per her daughter  Not hearing her when 15 or 20 feet apart NO colds or sinus problems   Off balance issues  Occasionally  No falls     Family hx of mthfr   Allergies   Allergen Reactions    Ciprofloxacin Nausea And Vomiting     SEVERE    Nsaids      Kidney disease    Abilify [Aripiprazole] Itching    Benadryl [Diphenhydramine] Other (See Comments)     COMPLETTELY SEDATES HER-PER DAUGHTER    Darvon [Propoxyphene] Rash    Dilaudid [Hydromorphone Hcl] Itching     CAN TOLERATE 1 DOSE IF NOT GIVEN BACK TO BACK-    Hydrocodone-Acetaminophen Nausea And Vomiting     8/23/2020--pt states that only the hydrodocone-acetaminophen combination causes a reaction, but pt doesn't react to acetaminophen by Kaur Palmer, RN    Morphine Itching and Nausea And Vomiting    Percocet [Oxycodone-Acetaminophen] Other (See Comments)     Makes her loopy,HALLUCINATIONS    Trintellix [Vortioxetine] Other (See Comments)     Hands/feet jittery, nausea     Current Outpatient Medications   Medication Sig Dispense Refill    vitamin D3 (CHOLECALCIFEROL) 25 MCG (1000 UT) TABS tablet Take 1 tablet by mouth daily 30 tablet 5    zoster recombinant adjuvanted vaccine (SHINGRIX) 50 MCG/0.5ML SUSR injection Inject 0.5 mLs into the muscle See Admin Instructions 1 dose now and repeat in 2-6 months 0.5 mL 0    levothyroxine (SYNTHROID) 25 MCG tablet TAKE 1 TABLET BY MOUTH DAILY 90 tablet 1    citalopram (CELEXA) 40 MG tablet TAKE 1 TABLET BY Determinants of Health     Financial Resource Strain: Low Risk     Difficulty of Paying Living Expenses: Not hard at all   Food Insecurity: No Food Insecurity    Worried About Running Out of Food in the Last Year: Never true    Dora of Food in the Last Year: Never true   Transportation Needs: No Transportation Needs    Lack of Transportation (Medical): No    Lack of Transportation (Non-Medical):  No   Physical Activity:     Days of Exercise per Week:     Minutes of Exercise per Session:    Stress:     Feeling of Stress :    Social Connections:     Frequency of Communication with Friends and Family:     Frequency of Social Gatherings with Friends and Family:     Attends [employer]ism Services:     Active Member of Clubs or Organizations:     Attends Club or Organization Meetings:     Marital Status:    Intimate Partner Violence:     Fear of Current or Ex-Partner:     Emotionally Abused:     Physically Abused:     Sexually Abused:       Past Medical History:   Diagnosis Date    Anxiety     Cervical herniated disc     Circulation problem     COPD [facility]ronic obstructive pulmonary disease) [facility] [address]     CRF (chronic renal failure), stage 3 (moderate) ([address]     Depression     Depression     Diverticulosis of colon     Fibromyalgia     Hx of blood clots     DVT    Hyperlipidemia     Joint ache     Kidney failure     states begining stage 4 since summer '17    Lumbar disc disease     Lumbar disc disease     Migraine     Muscle spasm of back     Pleural effusion     Prolonged emergence from general anesthesia     SVT (supraventricular tachycardia) ([address]     Thyroid disease     HYPOTHYROIDISM    Varicose vein      Past Surgical History:   Procedure Laterality Date    COLONOSCOPY      HERNIA REPAIR      right inguinal and UMBILICAL-2 SURGERIES    HYSTERECTOMY      HYSTERECTOMY, VAGINAL      PAIN MANAGEMENT PROCEDURE Left 6/12/2020    LUMBAR EPIDURAL STEROID INJECTION LEFT L4-L5 performed by Gabby Hopkins MD at [address] N/A 11/11/2020    CHOLECYSTECTOMY AND SIGMOID COLECTOMY performed by John England MD at [address]         Review of Systems   Constitutional: Negative for activity change, fatigue and unexpected weight change. HENT: Negative for congestion, nosebleeds, sinus pressure and voice change. Eyes: Negative for visual disturbance (glasses ). Respiratory: Positive for cough (chronic unchanged). Negative for wheezing and stridor. Cardiovascular: Negative for chest pain, palpitations and leg swelling. Gastrointestinal: Negative for abdominal pain, blood in stool, constipation, diarrhea, nausea and vomiting. Endocrine: Positive for cold intolerance. Negative for heat intolerance. Self breast exams does not do    Genitourinary: Negative for decreased urine volume and dysuria. Musculoskeletal: Positive for arthralgias, back pain and gait problem. Skin:        No concerning skin lesions    Neurological: Positive for headaches. Negative for dizziness, syncope, speech difficulty, weakness, light-headedness and numbness. Psychiatric/Behavioral: Negative for dysphoric mood and sleep disturbance. The patient is not nervous/anxious. OBJECTIVE:  /77 (Site: Left Upper Arm, Position: Sitting, Cuff Size: Medium Adult)   Pulse 88   Temp 98.4 °F (36.9 °C) (Oral)   Ht 5' 6\" (1.676 m)   Wt 159 lb 9.6 oz (72.4 kg)   LMP  (LMP Unknown)   SpO2 97%   BMI 25.76 kg/m²      Body mass index is 25.76 kg/m². Physical Exam  Vitals and nursing note reviewed. Constitutional:       General: She is not in acute distress. Appearance: Normal appearance. She is well-developed. HENT:      Head: Normocephalic and atraumatic.       Right Ear: Tympanic membrane and ear canal normal.      Left Ear: Tympanic membrane and ear canal normal.      Nose: Nose normal.      Mouth/Throat:      Pharynx: Oropharynx is clear. Eyes:      Conjunctiva/sclera: Conjunctivae normal.   Neck:      Thyroid: No thyromegaly. Vascular: No carotid bruit. Comments: Thyroid nodule   Cardiovascular:      Rate and Rhythm: Normal rate and regular rhythm. Heart sounds: Normal heart sounds. No murmur heard. No friction rub. No gallop. Pulmonary:      Effort: Pulmonary effort is normal.      Breath sounds: Normal breath sounds. Chest:      Chest wall: No tenderness. Abdominal:      General: Bowel sounds are normal. There is no distension. Palpations: Abdomen is soft. Tenderness: There is no abdominal tenderness. Comments: No hepatosplenomegaly   Musculoskeletal:         General: Tenderness (Low back and upper back, tenderness in right hip ) present. No swelling. Comments: Now with normal  rotation of shoulder   Lymphadenopathy:      Cervical: No cervical adenopathy. Skin:     General: Skin is warm and dry. Neurological:      General: No focal deficit present. Mental Status: She is alert. Motor: No abnormal muscle tone. Gait: Gait normal.      Comments: Remote and recent memory intact    Psychiatric:         Mood and Affect: Mood normal.         Behavior: Behavior normal.         Thought Content: Thought content normal.         ASSESSMENT/PLAN:    Alena Holder was seen today for medicare awv and medication refill. Diagnoses and all orders for this visit:    Routine general medical examination at a health care facility    Right hip pain  -     XR HIP RIGHT (2-3 VIEWS); Future    Chronic obstructive pulmonary disease, unspecified COPD type (HCC)  -     XR CHEST STANDARD (2 VW); Future    Acquired hypothyroidism  -     TSH without Reflex; Future    Thyroid nodule  -     US THYROID; Future    Mixed hyperlipidemia   -     Homocysteine, Serum; Future    Family history of 5,10-methylenetetrahydrofolate reductase (MTHFR) deficiency   -     Homocysteine, Serum;  Future     vitamin d deficiency  -     vitamin D3 (CHOLECALCIFEROL) 25 MCG (1000 UT) TABS tablet; Take 1 tablet by mouth daily    Encounter for screening mammogram for malignant neoplasm of breast  -     RIANA DIGITAL SCREEN W OR WO CAD BILATERAL; Future    vaccines    -     INFLUENZA, QUADV, ADJUVANTED, 65 YRS =, IM, PF, PREFILL SYR, 0.5ML (FLUAD)  -     PNEUMOVAX 23 subcutaneous/IM (Pneumococcal polysaccharide vaccine 23-valent >= 3yo)  -     zoster recombinant adjuvanted vaccine (SHINGRIX) 50 MCG/0.5ML SUSR injection; Inject 0.5 mLs into the muscle See Admin Instructions 1 dose now and repeat in 2-6 months        Orders Placed This Encounter   Medications    vitamin D3 (CHOLECALCIFEROL) 25 MCG (1000 UT) TABS tablet     Sig: Take 1 tablet by mouth daily     Dispense:  30 tablet     Refill:  5    zoster recombinant adjuvanted vaccine (SHINGRIX) 50 MCG/0.5ML SUSR injection     Sig: Inject 0.5 mLs into the muscle See Admin Instructions 1 dose now and repeat in 2-6 months     Dispense:  0.5 mL     Refill:  0        Return for Medicare Annual Wellness Visit in 1 year. Patient Instructions       Personalized Preventive Plan for Tabatha Pillai - 10/19/2021  Medicare offers a range of preventive health benefits. Some of the tests and screenings are paid in full while other may be subject to a deductible, co-insurance, and/or copay. Some of these benefits include a comprehensive review of your medical history including lifestyle, illnesses that may run in your family, and various assessments and screenings as appropriate. After reviewing your medical record and screening and assessments performed today your provider may have ordered immunizations, labs, imaging, and/or referrals for you. A list of these orders (if applicable) as well as your Preventive Care list are included within your After Visit Summary for your review.     Other Preventive Recommendations:    · A preventive eye exam performed by an eye specialist is recommended every 1-2 years to screen for glaucoma; cataracts, macular degeneration, and other eye disorders. · A preventive dental visit is recommended every 6 months. · Try to get at least 150 minutes of exercise per week or 10,000 steps per day on a pedometer . · Order or download the FREE \"Exercise & Physical Activity: Your Everyday Guide\" from [employer]. Call 0-595.475.4870 or search [employer] online. · You need 0272-7930 mg of calcium and 4542-9534 IU of vitamin D per day. It is possible to meet your calcium requirement with diet alone, but a vitamin D supplement is usually necessary to meet this goal.  · When exposed to the sun, use a sunscreen that protects against both UVA and UVB radiation with an SPF of 30 or greater. Reapply every 2 to 3 hours or after sweating, drying off with a towel, or swimming. · Always wear a seat belt when traveling in a car. Always wear a helmet when riding a bicycle or motorcycle. Patient Education        Well Visit, Over 72: Care Instructions  Overview     Well visits can help you stay healthy. Your doctor has checked your overall health and may have suggested ways to take good care of yourself. Your doctor also may have recommended tests. At home, you can help prevent illness with healthy eating, regular exercise, and other steps. Follow-up care is a key part of your treatment and safety. Be sure to make and go to all appointments, and call your doctor if you are having problems. It's also a good idea to know your test results and keep a list of the medicines you take. How can you care for yourself at home? Get screening tests that you and your doctor decide on. Screening helps find diseases before any symptoms appear. Eat healthy foods. Choose fruits, vegetables, whole grains, protein, and low-fat dairy foods. Limit fat, especially saturated fat. Reduce salt in your diet. Limit alcohol.  If you are a man, have no more than 2 drinks a day or 14 drinks a week. If you are a woman, have no more than 1 drink a day or 7 drinks a week. Since alcohol affects older adults differently, you may want to limit alcohol even more. Or you may not want to drink at all. Get at least 30 minutes of exercise on most days of the week. Walking is a good choice. You also may want to do other activities, such as running, swimming, cycling, or playing tennis or team sports. Reach and stay at a healthy weight. This will lower your risk for many problems, such as obesity, diabetes, heart disease, and high blood pressure. Do not smoke. Smoking can make health problems worse. If you need help quitting, talk to your doctor about stop-smoking programs and medicines. These can increase your chances of quitting for good. Care for your mental health. It is easy to get weighed down by worry and stress. Learn strategies to manage stress, like deep breathing and mindfulness, and stay connected with your family and community. If you find you often feel sad or hopeless, talk with your doctor. Treatment can help. Talk to your doctor about whether you have any risk factors for sexually transmitted infections (STIs). You can help prevent STIs if you wait to have sex with a new partner (or partners) until you've each been tested for STIs. It also helps if you use condoms (male or female condoms) and if you limit your sex partners to one person who only has sex with you. Vaccines are available for some STIs. If you think you may have a problem with alcohol or drug use, talk to your doctor. This includes prescription medicines (such as amphetamines and opioids) and illegal drugs (such as cocaine and methamphetamine). Your doctor can help you figure out what type of treatment is best for you. Protect your skin from too much sun. When you're outdoors from 10 a.m. to 4 p.m., stay in the shade or cover up with clothing and a hat with a wide brim. Wear sunglasses that block UV rays.  Even when it's cloudy, put broad-spectrum sunscreen (SPF 30 or higher) on any exposed skin. See a dentist one or two times a year for checkups and to have your teeth cleaned. Wear a seat belt in the car. When should you call for help? Watch closely for changes in your health, and be sure to contact your doctor if you have any problems or symptoms that concern you. Where can you learn more? Go to https://Foodziepepiceweb.bCODE. org and sign in to your [employer] account. Enter H408 in the [employer] box to learn more about \"Well Visit, Over 65: Care Instructions. \"     If you do not have an account, please click on the \"Sign Up Now\" link. Current as of: February 11, 2021               Content Version: 13.0  © 2006-2021 Healthwise, [employer]. Care instructions adapted under license by [facility] ([facility]). If you have questions about a medical condition or this instruction, always ask your healthcare professional. [place] [address] any warranty or liability for your use of this information. Patient Education        Earwax Blockage: Care Instructions  Your Care Instructions     Earwax is a natural substance that protects the ear canal. Normally, earwax drains from the ears and does not cause problems. Sometimes earwax builds up and hardens. Earwax blockage (also called cerumen impaction) can cause some loss of hearing and pain. When wax is tightly packed, you will need to have your doctor remove it. Follow-up care is a key part of your treatment and safety. Be sure to make and go to all appointments, and call your doctor if you are having problems. It's also a good idea to know your test results and keep a list of the medicines you take. How can you care for yourself at home? Do not try to remove earwax with cotton swabs, fingers, or other objects. This can make the blockage worse and damage the eardrum.   If your doctor recommends that you try to remove earwax at home:  Soften and loosen the earwax with warm mineral oil. You also can try hydrogen peroxide mixed with an equal amount of room temperature water. Place 2 drops of the fluid, warmed to body temperature, in the ear two times a day for up to 5 days. Once the wax is loose and soft, all that is usually needed to remove it from the ear canal is a gentle, warm shower. Direct the water into the ear, then tip your head to let the earwax drain out. Dry your ear thoroughly with a hair dryer set on low. Hold the dryer several inches from your ear. If the warm mineral oil and shower do not work, use an over-the-counter wax softener. Read and follow all instructions on the label. After using the wax softener, use an ear syringe to gently flush the ear. Make sure the flushing solution is body temperature. Cool or hot fluids in the ear can cause dizziness. When should you call for help? Call your doctor now or seek immediate medical care if:    Pus or blood drains from your ear.     Your ears are ringing or feel full.     You have a loss of hearing. Watch closely for changes in your health, and be sure to contact your doctor if:    You have pain or reduced hearing after 1 week of home treatment.     You have any new symptoms, such as nausea or balance problems. Where can you learn more? Go to https://SlideSharepeThe [employer].Useful at Night. org and sign in to your [employer] account. Enter N885 in the [facility] box to learn more about \"Earwax Blockage: Care Instructions. \"     If you do not have an account, please click on the \"Sign Up Now\" link. Current as of: July 1, 2021               Content Version: 13.0  © 9555-6266 Healthwise, Inc[employer]. Care instructions adapted under license by [facility] If you have questions about a medical condition or this instruction, always ask your healthcare professional. [address] any warranty or liability for your use of this information.          Patient Education Earwax Blockage: Care Instructions  Your Care Instructions     Earwax is a natural substance that protects the ear canal. Normally, earwax drains from the ears and does not cause problems. Sometimes earwax builds up and hardens. Earwax blockage (also called cerumen impaction) can cause some loss of hearing and pain. When wax is tightly packed, you will need to have your doctor remove it. Follow-up care is a key part of your treatment and safety. Be sure to make and go to all appointments, and call your doctor if you are having problems. It's also a good idea to know your test results and keep a list of the medicines you take. How can you care for yourself at home? Do not try to remove earwax with cotton swabs, fingers, or other objects. This can make the blockage worse and damage the eardrum. If your doctor recommends that you try to remove earwax at home:  Soften and loosen the earwax with warm mineral oil. You also can try hydrogen peroxide mixed with an equal amount of room temperature water. Place 2 drops of the fluid, warmed to body temperature, in the ear two times a day for up to 5 days. Once the wax is loose and soft, all that is usually needed to remove it from the ear canal is a gentle, warm shower. Direct the water into the ear, then tip your head to let the earwax drain out. Dry your ear thoroughly with a hair dryer set on low. Hold the dryer several inches from your ear. If the warm mineral oil and shower do not work, use an over-the-counter wax softener. Read and follow all instructions on the label. After using the wax softener, use an ear syringe to gently flush the ear. Make sure the flushing solution is body temperature. Cool or hot fluids in the ear can cause dizziness. When should you call for help? Call your doctor now or seek immediate medical care if:    Pus or blood drains from your ear.     Your ears are ringing or feel full.     You have a loss of hearing.    Watch closely for changes in your health, and be sure to contact your doctor if:    You have pain or reduced hearing after 1 week of home treatment.     You have any new symptoms, such as nausea or balance problems. Where can you learn more? Go to https://chpetanyaeb.Intellecap. org and sign in to your [facility] account. Enter L350 in the [employer] box to learn more about \"Earwax Blockage: Care Instructions. \"     If you do not have an account, please click on the \"Sign Up Now\" link. Current as of: July 1, 2021               Content Version: 13.0  © 5220-3475 Healthwise, Incorporated. Care instructions adapted under license by [facility] ([facility]). If you have questions about a medical condition or this instruction, always ask your healthcare professional. [address] any warranty or liability for your use of this information.

## 2021-10-26 ENCOUNTER — HOSPITAL ENCOUNTER (OUTPATIENT)
Dept: GENERAL RADIOLOGY | Age: 71
Discharge: HOME | End: 2021-10-26
Payer: MEDICARE

## 2021-10-26 ENCOUNTER — HOSPITAL ENCOUNTER (OUTPATIENT)
Age: 71
Discharge: HOME | End: 2021-10-26
Payer: MEDICARE

## 2021-10-26 DIAGNOSIS — J44.9 CHRONIC OBSTRUCTIVE PULMONARY DISEASE, UNSPECIFIED COPD TYPE (HCC): ICD-10-CM

## 2021-10-26 DIAGNOSIS — Z83.49 FAMILY HISTORY OF 5,10-METHYLENETETRAHYDROFOLATE REDUCTASE (MTHFR) DEFICIENCY: ICD-10-CM

## 2021-10-26 DIAGNOSIS — M47.812 ARTHRITIS OF NECK: ICD-10-CM

## 2021-10-26 DIAGNOSIS — M25.551 RIGHT HIP PAIN: ICD-10-CM

## 2021-10-26 DIAGNOSIS — E03.9 ACQUIRED HYPOTHYROIDISM: ICD-10-CM

## 2021-10-26 DIAGNOSIS — M51.9 LUMBAR DISC DISEASE: ICD-10-CM

## 2021-10-26 DIAGNOSIS — G89.4 CHRONIC PAIN SYNDROME: ICD-10-CM

## 2021-10-26 DIAGNOSIS — E78.2 MIXED HYPERLIPIDEMIA: ICD-10-CM

## 2021-10-26 LAB
HOMOCYSTEINE: 22 UMOL/L (ref 0–10)
TSH SERPL DL<=0.05 MIU/L-ACNC: 2.14 UIU/ML (ref 0.27–4.2)

## 2021-10-26 PROCEDURE — 71046 X-RAY EXAM CHEST 2 VIEWS: CPT

## 2021-10-26 PROCEDURE — 73502 X-RAY EXAM HIP UNI 2-3 VIEWS: CPT

## 2021-10-26 RX ORDER — TRAMADOL HYDROCHLORIDE 50 MG/1
50 TABLET ORAL 2 TIMES DAILY PRN
Qty: 60 TABLET | Refills: 0 | Status: SHIPPED | OUTPATIENT
Start: 2021-10-26 | End: 2021-11-22

## 2021-10-28 DIAGNOSIS — R19.7 DIARRHEA, UNSPECIFIED TYPE: ICD-10-CM

## 2021-10-28 RX ORDER — PROMETHAZINE HYDROCHLORIDE 12.5 MG/1
12.5 TABLET ORAL EVERY 8 HOURS PRN
Qty: 90 TABLET | Refills: 0 | Status: SHIPPED | OUTPATIENT
Start: 2021-10-28

## 2021-10-29 ENCOUNTER — TELEPHONE (OUTPATIENT)
Dept: FAMILY MEDICINE CLINIC | Age: 71
End: 2021-10-29

## 2021-11-01 ENCOUNTER — HOSPITAL ENCOUNTER (OUTPATIENT)
Dept: ULTRASOUND IMAGING | Age: 71
Discharge: HOME | End: 2021-11-01
Payer: MEDICARE

## 2021-11-01 DIAGNOSIS — E04.1 THYROID NODULE: ICD-10-CM

## 2021-11-01 PROCEDURE — 76536 US EXAM OF HEAD AND NECK: CPT

## 2021-11-02 ENCOUNTER — OFFICE VISIT (OUTPATIENT)
Dept: FAMILY MEDICINE CLINIC | Age: 71
End: 2021-11-02
Payer: MEDICARE

## 2021-11-02 VITALS
BODY MASS INDEX: 25.75 KG/M2 | WEIGHT: 160.2 LBS | TEMPERATURE: 98.2 F | DIASTOLIC BLOOD PRESSURE: 78 MMHG | HEIGHT: 66 IN | SYSTOLIC BLOOD PRESSURE: 120 MMHG

## 2021-11-02 DIAGNOSIS — M25.562 LEFT KNEE PAIN, UNSPECIFIED CHRONICITY: Primary | ICD-10-CM

## 2021-11-02 PROCEDURE — G8484 FLU IMMUNIZE NO ADMIN: HCPCS | Performed by: NURSE PRACTITIONER

## 2021-11-02 PROCEDURE — 4040F PNEUMOC VAC/ADMIN/RCVD: CPT | Performed by: NURSE PRACTITIONER

## 2021-11-02 PROCEDURE — G8427 DOCREV CUR MEDS BY ELIG CLIN: HCPCS | Performed by: NURSE PRACTITIONER

## 2021-11-02 PROCEDURE — 4004F PT TOBACCO SCREEN RCVD TLK: CPT | Performed by: NURSE PRACTITIONER

## 2021-11-02 PROCEDURE — 1090F PRES/ABSN URINE INCON ASSESS: CPT | Performed by: NURSE PRACTITIONER

## 2021-11-02 PROCEDURE — G8417 CALC BMI ABV UP PARAM F/U: HCPCS | Performed by: NURSE PRACTITIONER

## 2021-11-02 PROCEDURE — 99213 OFFICE O/P EST LOW 20 MIN: CPT | Performed by: NURSE PRACTITIONER

## 2021-11-02 PROCEDURE — 3017F COLORECTAL CA SCREEN DOC REV: CPT | Performed by: NURSE PRACTITIONER

## 2021-11-02 PROCEDURE — G8400 PT W/DXA NO RESULTS DOC: HCPCS | Performed by: NURSE PRACTITIONER

## 2021-11-02 PROCEDURE — 1123F ACP DISCUSS/DSCN MKR DOCD: CPT | Performed by: NURSE PRACTITIONER

## 2021-11-02 ASSESSMENT — ENCOUNTER SYMPTOMS
WHEEZING: 0
COUGH: 0
BACK PAIN: 1
SHORTNESS OF BREATH: 0

## 2021-11-02 NOTE — PATIENT INSTRUCTIONS
Patient Education        Joint Pain: Care Instructions  Your Care Instructions     Many people have small aches and pains from overuse or injury to muscles and joints. Joint injuries often happen during sports or recreation, work tasks, or projects around the home. An overuse injury can happen when you put too much stress on a joint or when you do an activity that stresses the joint over and over, such as using the computer or rowing a boat. You can take action at home to help your muscles and joints get better. You should feel better in 1 to 2 weeks, but it can take 3 months or more to heal completely. Follow-up care is a key part of your treatment and safety. Be sure to make and go to all appointments, and call your doctor if you are having problems. It's also a good idea to know your test results and keep a list of the medicines you take. How can you care for yourself at home? · Do not put weight on the injured joint for at least a day or two. · For the first day or two after an injury, do not take hot showers or baths, and do not use hot packs. The heat could make swelling worse. · Put ice or a cold pack on the sore joint for 10 to 20 minutes at a time. Try to do this every 1 to 2 hours for the next 3 days (when you are awake) or until the swelling goes down. Put a thin cloth between the ice and your skin. · Wrap the injury in an elastic bandage. Do not wrap it too tightly because this can cause more swelling. · Prop up the sore joint on a pillow when you ice it or anytime you sit or lie down during the next 3 days. Try to keep it above the level of your heart. This will help reduce swelling. · Take an over-the-counter pain medicine, such as acetaminophen (Tylenol), ibuprofen (Advil, Motrin), or naproxen (Aleve). Read and follow all instructions on the label. · After 1 or 2 days of rest, begin moving the joint gently.  While the joint is still healing, you can begin to exercise using activities that do not strain or hurt the painful joint. When should you call for help? Call your doctor now or seek immediate medical care if:    · You have signs of infection, such as:  ? Increased pain, swelling, warmth, and redness. ? Red streaks leading from the joint. ? A fever. Watch closely for changes in your health, and be sure to contact your doctor if:    · Your movement or symptoms are not getting better after 1 to 2 weeks of home treatment. Where can you learn more? Go to https://Worlize.P2P-Next. org and sign in to your [employer] account. Enter P205 in the [employer] box to learn more about \"Joint Pain: Care Instructions. \"     If you do not have an account, please click on the \"Sign Up Now\" link. Current as of: July 1, 2021               Content Version: 13.0  © 2006-2021 [employer]. Care instructions adapted under license by [employer]. If you have questions about a medical condition or this instruction, always ask your healthcare professional. [place] [address] any warranty or liability for your use of this information. Patient Education        Learning About Joint Injections  What are joint injections? Joint injections are shots into a joint, such as the knee or shoulder. They are used to put in medicines, such as pain relievers and steroid medicines. Steroids can help reduce inflammation. A steroid shot can sometimes help with short-term pain relief when other treatments haven't worked. How are they done? First, the area over the joint will be cleaned. Your doctor may then use a tiny needle to numb the skin in the area where you will get the joint injection. If a tiny needle is used to numb the area, your doctor will use another needle to inject the medicine. Your doctor may use a pain reliever, a steroid, or both. You may feel some pressure or discomfort. Your doctor may put ice on the area before you go home.   What can you expect after a joint injection? You will probably go home soon after your shot. You may have numbness around the joint for a few hours. If your shot included both a pain reliever and a steroid, then the pain will probably go away right away. But it might come back after a few hours. This might happen if the pain reliever wears off and the steroid hasn't started to work yet. Steroids don't always work. But when they do, the pain relief can last for several days to a few months or longer. Your doctor may tell you to use ice on the area. You can also use ice if the pain comes back. Put ice or a cold pack on your joint for 10 to 20 minutes at a time. Put a thin cloth between the ice and your skin. Follow your doctor's instructions carefully. Follow-up care is a key part of your treatment and safety. Be sure to make and go to all appointments, and call your doctor if you are having problems. It's also a good idea to know your test results and keep a list of the medicines you take. Where can you learn more? Go to https://bigclix.com.Nambii. org and sign in to your [employer] account. Enter T938 in the [employer] box to learn more about \"Learning About Joint Injections. \"     If you do not have an account, please click on the \"Sign Up Now\" link. Current as of: July 1, 2021               Content Version: 13.0  © 2006-2021 Healthwise[employer] Incorporated. Care instructions adapted under license by [facility] ([facility]). If you have questions about a medical condition or this instruction, always ask your healthcare professional. [address] any warranty or liability for your use of this information.

## 2021-11-02 NOTE — PROGRESS NOTES
Misa Mcgrath (:  1950) is a 80 y.o. female,Established patient, here for evaluation of the following chief complaint(s):  Joint Swelling (left knee was giving out and real tight.)         ASSESSMENT/PLAN:  1. Left knee pain, unspecified chronicity  Call and schedule appt with   - Michel Reynoso, Orthopedic Surgery, [facility]       Return if symptoms worsen or fail to improve. Subjective   SUBJECTIVE/OBJECTIVE:  HPI  Presents today for left knee pain and swelling since yesterday. States she woke up and noticed it. Denies injury or repetative motion. Today the knee more painful, hurst to bear weight and the swelling has increased. Pain 5/10, dull ache in nature, denies radiating. States she has had right hip pain over the last few months, from arthritis. Reports she can not take oral steroids because they make her feel weird. Current Outpatient Medications   Medication Sig Dispense Refill    promethazine (PHENERGAN) 12.5 MG tablet Take 1 tablet by mouth every 8 hours as needed for Nausea TAKE 1 TABLET BY MOUTH EVERY DAY AS NEEDED 90 tablet 0    traMADol (ULTRAM) 50 MG tablet Take 1 tablet by mouth 2 times daily as needed for Pain for up to 30 days.  60 tablet 0    vitamin D3 (CHOLECALCIFEROL) 25 MCG (1000 UT) TABS tablet Take 1 tablet by mouth daily 30 tablet 5    zoster recombinant adjuvanted vaccine (SHINGRIX) 50 MCG/0.5ML SUSR injection Inject 0.5 mLs into the muscle See Admin Instructions 1 dose now and repeat in 2-6 months 0.5 mL 0    levothyroxine (SYNTHROID) 25 MCG tablet TAKE 1 TABLET BY MOUTH DAILY 90 tablet 1    citalopram (CELEXA) 40 MG tablet TAKE 1 TABLET BY MOUTH DAILY 90 tablet 1    lovastatin (MEVACOR) 40 MG tablet TAKE 1 TABLET BY MOUTH EVERY NIGHT 90 tablet 1    baclofen (LIORESAL) 10 MG tablet TAKE 1 TABLET BY MOUTH THREE TIMES DAILY AS NEEDED FOR MUSCLE SPASM 90 tablet 2    predniSONE (DELTASONE) 20 MG tablet 3 pills daily for 3 days, 2 pills daily for 3 days, 1 pill daily for 3 days and 1/2 daily for 3 days 20 tablet 0    acetaminophen (TYLENOL) 500 MG tablet Take 1,000 mg by mouth 2 times daily      albuterol (PROVENTIL) (2.5 MG/3ML) 0.083% nebulizer solution Take 3 mLs by nebulization every 6 hours as needed for Wheezing 120 each 0    dicyclomine (BENTYL) 10 MG capsule Take 1 capsule by mouth 4 times daily as needed (abdominal cramping and diarrhea) 120 capsule 1    PROVENTIL  (90 BASE) MCG/ACT inhaler INHALE 2 PUFFS BY MOUTH EVERY 6 HOURS AS NEEDED FOR WHEEZING 6.7 g 1    gabapentin (NEURONTIN) 600 MG tablet Take 1 tablet by mouth 3 times daily for 90 days. 270 tablet 0     No current facility-administered medications for this visit. Past Medical History:   Diagnosis Date    Anxiety     Cervical herniated disc     Circulation problem     [facility]     CRF (chronic renal failure), stage 3 (moderate) ([facility])     Depression     Depression     Diverticulosis of colon     Fibromyalgia     Hx of blood clots     DVT    Hyperlipidemia     Joint ache     Kidney failure     states begining stage 4 since summer '17    Lumbar disc disease     Lumbar disc disease     Migraine     Muscle spasm of back     Pleural effusion     Prolonged emergence from general anesthesia     SVT [facility]supraventricular tachycardia) ([facility])     Thyroid disease     HYPOTHYROIDISM    Varicose vein         Review of Systems   Constitutional: Positive for activity change. Negative for fatigue, fever and unexpected weight change. Respiratory: Negative for cough, shortness of breath and wheezing. Cardiovascular: Negative for chest pain, palpitations and leg swelling. Musculoskeletal: Positive for arthralgias (right hip, left knee), back pain (chronic), gait problem (limping on left knee) and joint swelling (left knee). Neurological: Negative for weakness and numbness.           Objective   Physical Exam  Constitutional:       General: She is not in acute distress. Appearance: She is obese. She is not ill-appearing. Cardiovascular:      Rate and Rhythm: Normal rate and regular rhythm. Pulses: Normal pulses. Heart sounds: Normal heart sounds. Pulmonary:      Effort: Pulmonary effort is normal.      Breath sounds: Normal breath sounds. Musculoskeletal:      Right knee: No swelling. Normal range of motion. No tenderness. Left knee: Swelling (medial aspect ) present. No bony tenderness or crepitus. Normal range of motion. Tenderness present over the medial joint line. No LCL laxity, MCL laxity, ACL laxity or PCL laxity. Right lower leg: No edema. Left lower leg: No edema. Skin:     General: Skin is warm and dry. Capillary Refill: Capillary refill takes less than 2 seconds. Findings: No bruising, erythema or rash. Neurological:      Mental Status: She is alert and oriented to person, place, and time.               --BULMARO Fall - NP

## 2021-11-04 DIAGNOSIS — R29.898 RIGHT ARM WEAKNESS: ICD-10-CM

## 2021-11-04 DIAGNOSIS — H53.2 DOUBLE VISION: Primary | ICD-10-CM

## 2021-11-04 DIAGNOSIS — I65.23 BILATERAL CAROTID ARTERY STENOSIS: ICD-10-CM

## 2021-11-10 ENCOUNTER — HOSPITAL ENCOUNTER (OUTPATIENT)
Dept: VASCULAR LAB | Age: 71
Discharge: HOME | End: 2021-11-10
Payer: MEDICARE

## 2021-11-10 ENCOUNTER — HOSPITAL ENCOUNTER (OUTPATIENT)
Dept: CT IMAGING | Age: 71
Discharge: HOME | End: 2021-11-10
Payer: MEDICARE

## 2021-11-10 DIAGNOSIS — R29.898 RIGHT ARM WEAKNESS: ICD-10-CM

## 2021-11-10 DIAGNOSIS — I65.23 BILATERAL CAROTID ARTERY STENOSIS: ICD-10-CM

## 2021-11-10 DIAGNOSIS — F33.0 MILD EPISODE OF RECURRENT MAJOR DEPRESSIVE DISORDER (HCC): ICD-10-CM

## 2021-11-10 DIAGNOSIS — H53.2 DOUBLE VISION: ICD-10-CM

## 2021-11-10 PROCEDURE — 70450 CT HEAD/BRAIN W/O DYE: CPT

## 2021-11-10 PROCEDURE — 93880 EXTRACRANIAL BILAT STUDY: CPT

## 2021-11-10 RX ORDER — CITALOPRAM 40 MG/1
TABLET ORAL
Qty: 90 TABLET | Refills: 1 | Status: SHIPPED | OUTPATIENT
Start: 2021-11-10 | End: 2021-12-27

## 2021-11-10 RX ORDER — LOVASTATIN 40 MG/1
TABLET ORAL
Qty: 90 TABLET | Refills: 1 | Status: SHIPPED | OUTPATIENT
Start: 2021-11-10 | End: 2022-05-12

## 2021-11-11 ENCOUNTER — TELEPHONE (OUTPATIENT)
Dept: FAMILY MEDICINE CLINIC | Age: 71
End: 2021-11-11

## 2021-11-11 NOTE — TELEPHONE ENCOUNTER
Talked to the patient daughter Shaila Cuadra. Terry Antonio is informed of the result and verbalized understanding.

## 2021-11-11 NOTE — TELEPHONE ENCOUNTER
Patient is asking if you would call her daughter Rai Strickland @ [phone] to explain test results to her.

## 2021-11-12 RX ORDER — ASPIRIN 81 MG/1
81 TABLET ORAL DAILY
Qty: 90 TABLET | Refills: 1 | Status: SHIPPED | OUTPATIENT
Start: 2021-11-12 | End: 2022-05-04

## 2021-11-22 DIAGNOSIS — G89.4 CHRONIC PAIN SYNDROME: ICD-10-CM

## 2021-11-22 DIAGNOSIS — M51.9 LUMBAR DISC DISEASE: ICD-10-CM

## 2021-11-22 DIAGNOSIS — M47.812 ARTHRITIS OF NECK: ICD-10-CM

## 2021-11-22 RX ORDER — TRAMADOL HYDROCHLORIDE 50 MG/1
50 TABLET ORAL 2 TIMES DAILY PRN
Qty: 60 TABLET | Refills: 1 | Status: SHIPPED | OUTPATIENT
Start: 2021-11-22 | End: 2022-01-24

## 2021-12-06 ENCOUNTER — TELEPHONE (OUTPATIENT)
Dept: FAMILY MEDICINE CLINIC | Age: 71
End: 2021-12-06

## 2021-12-06 ENCOUNTER — VIRTUAL VISIT (OUTPATIENT)
Dept: FAMILY MEDICINE CLINIC | Age: 71
End: 2021-12-06
Payer: MEDICARE

## 2021-12-06 DIAGNOSIS — G89.4 CHRONIC PAIN SYNDROME: ICD-10-CM

## 2021-12-06 DIAGNOSIS — N18.2 CHRONIC RENAL FAILURE, STAGE 2 (MILD): ICD-10-CM

## 2021-12-06 DIAGNOSIS — U07.1 COVID: Primary | ICD-10-CM

## 2021-12-06 PROBLEM — N18.30 CHRONIC RENAL FAILURE, STAGE 3 (MODERATE) (HCC): Status: RESOLVED | Noted: 2019-02-24 | Resolved: 2021-12-06

## 2021-12-06 PROCEDURE — G8427 DOCREV CUR MEDS BY ELIG CLIN: HCPCS | Performed by: INTERNAL MEDICINE

## 2021-12-06 PROCEDURE — 4040F PNEUMOC VAC/ADMIN/RCVD: CPT | Performed by: INTERNAL MEDICINE

## 2021-12-06 PROCEDURE — 1090F PRES/ABSN URINE INCON ASSESS: CPT | Performed by: INTERNAL MEDICINE

## 2021-12-06 PROCEDURE — G8400 PT W/DXA NO RESULTS DOC: HCPCS | Performed by: INTERNAL MEDICINE

## 2021-12-06 PROCEDURE — 3017F COLORECTAL CA SCREEN DOC REV: CPT | Performed by: INTERNAL MEDICINE

## 2021-12-06 PROCEDURE — 99213 OFFICE O/P EST LOW 20 MIN: CPT | Performed by: INTERNAL MEDICINE

## 2021-12-06 PROCEDURE — 1123F ACP DISCUSS/DSCN MKR DOCD: CPT | Performed by: INTERNAL MEDICINE

## 2021-12-06 NOTE — PROGRESS NOTES
Alecia Ritchie, was evaluated through a synchronous (real-time) audio-video encounter. The patient (or guardian if applicable) is aware that this is a billable service. Verbal consent to proceed has been obtained within the past 12 months. The visit was conducted pursuant to the emergency declaration under the [facility] [place], [address] [place] [address] and the [employer] General Act. Patient identification was verified, and a caregiver was present when appropriate. The patient was located in a state where the provider was credentialed to provide care. Total time spent for this encounter: Not billed by time    --Adilson Alexandra MD on 12/12/2021 at 7:23 PM    An electronic signature was used to authenticate this note. SUBJECTIVE:  Alecia Ritchie is a 80 y.o. female being evaluated for:    Chief Complaint   Patient presents with    Positive For Covid-19     Talked to the pt she tested Friday 12/3 @ [facility] and results came back today Positive. Body aches, neck hurts,Non productive Cough, Head Congestion, Chest Congestion, Headache, no appetite, low grade fever 99.        HPI   Symptoms started on Friday   Tested for  COVID and came back positive  [place] and granddaughter ill with COVID   Bad headache since Friday neck and body hurts  Stuffy head and chest  Not sob   Coughing NOt productive  No gi symptoms  NO appetite but drinking fluid   Low grade fevers     Very fatigued  NO energy to do anything   Allergies   Allergen Reactions    Ciprofloxacin Nausea And Vomiting     SEVERE    Nsaids      Kidney disease    Abilify [Aripiprazole] Itching    Benadryl [Diphenhydramine] Other (See Comments)     COMPLETTELY SEDATES HER-PER DAUGHTER    Darvon [Propoxyphene] Rash    Dilaudid [Hydromorphone Hcl] Itching     CAN TOLERATE 1 DOSE IF NOT GIVEN BACK TO BACK-    Hydrocodone-Acetaminophen Nausea And Vomiting     8/23/2020--pt states that only the hydrodocone-acetaminophen combination causes a reaction, but pt doesn't react to acetaminophen by Jesús Aguirre, RN    Morphine Itching and Nausea And Vomiting    Percocet [Oxycodone-Acetaminophen] Other (See Comments)     Makes her loopy,HALLUCINATIONS    Trintellix [Vortioxetine] Other (See Comments)     Hands/feet jittery, nausea     Current Outpatient Medications   Medication Sig Dispense Refill    traMADol (ULTRAM) 50 MG tablet Take 1 tablet by mouth 2 times daily as needed for Pain for up to 60 days. 60 tablet 1    aspirin EC 81 MG EC tablet Take 1 tablet by mouth daily 90 tablet 1    lovastatin (MEVACOR) 40 MG tablet TAKE 1 TABLET BY MOUTH EVERY NIGHT 90 tablet 1    citalopram (CELEXA) 40 MG tablet TAKE 1 TABLET BY MOUTH DAILY 90 tablet 1    promethazine (PHENERGAN) 12.5 MG tablet Take 1 tablet by mouth every 8 hours as needed for Nausea TAKE 1 TABLET BY MOUTH EVERY DAY AS NEEDED 90 tablet 0    vitamin D3 (CHOLECALCIFEROL) 25 MCG (1000 UT) TABS tablet Take 1 tablet by mouth daily 30 tablet 5    levothyroxine (SYNTHROID) 25 MCG tablet TAKE 1 TABLET BY MOUTH DAILY 90 tablet 1    gabapentin (NEURONTIN) 600 MG tablet Take 1 tablet by mouth 3 times daily for 90 days.  270 tablet 0    acetaminophen (TYLENOL) 500 MG tablet Take 1,000 mg by mouth 2 times daily      albuterol (PROVENTIL) (2.5 MG/3ML) 0.083% nebulizer solution Take 3 mLs by nebulization every 6 hours as needed for Wheezing 120 each 0    dicyclomine (BENTYL) 10 MG capsule Take 1 capsule by mouth 4 times daily as needed (abdominal cramping and diarrhea) 120 capsule 1    PROVENTIL  (90 BASE) MCG/ACT inhaler INHALE 2 PUFFS BY MOUTH EVERY 6 HOURS AS NEEDED FOR WHEEZING 6.7 g 1    baclofen (LIORESAL) 10 MG tablet TAKE 1 TABLET BY MOUTH THREE TIMES DAILY AS NEEDED FOR MUSCLE SPASM 90 tablet 2    zoster recombinant adjuvanted vaccine (SHINGRIX) 50 MCG/0.5ML SUSR injection Inject 0.5 mLs into the muscle See Admin Instructions 1 dose now and repeat in 2-6 months (Patient not taking: Reported on 12/6/2021) 0.5 mL 0     No current facility-administered medications for this visit. Social History     Socioeconomic History    Marital status:      Spouse name: Not on file    Number of children: 2    Years of education: Not on file    Highest education level: Not on file   Occupational History    Occupation: NA   Tobacco Use    Smoking status: Current Every Day Smoker     Packs/day: 0.33     Years: 45.00     Pack years: 14.85     Types: Cigarettes     Start date: 8/16/2014    Smokeless tobacco: Never Used    Tobacco comment: would like to quit   Vaping Use    Vaping Use: Never used   Substance and Sexual Activity    Alcohol use: No     Alcohol/week: 0.0 standard drinks    Drug use: Never    Sexual activity: Never   Other Topics Concern    Not on file   Social History Narrative    Not on file     Social Determinants of Health     Financial Resource Strain: Low Risk     Difficulty of Paying Living Expenses: Not hard at all   Food Insecurity: No Food Insecurity    Worried About Running Out of Food in the Last Year: Never true    Dora of Food in the Last Year: Never true   Transportation Needs: No Transportation Needs    Lack of Transportation (Medical): No    Lack of Transportation (Non-Medical):  No   Physical Activity:     Days of Exercise per Week: Not on file    Minutes of Exercise per Session: Not on file   Stress:     Feeling of Stress : Not on file   Social Connections:     Frequency of Communication with Friends and Family: Not on file    Frequency of Social Gatherings with Friends and Family: Not on file    Attends Quaker Services: Not on file    Active Member of Clubs or Organizations: Not on file    Attends Club or Organization Meetings: Not on file    Marital Status: Not on file   Intimate Partner Violence:     Fear of Current or Ex-Partner: Not on file   [employer] Abused: Not on file    Physically Abused: Not on file    Sexually Abused: Not on file   Housing Stability:     Unable to Pay for Housing in the Last Year: Not on file    Number of Places Lived in the Last Year: Not on file    Unstable Housing in the Last Year: Not on file      Past Medical History:   Diagnosis Date    Anxiety     Cervical herniated disc     Circulation problem     [facility]     CRF (chronic renal failure), stage 3 (moderate) [facility]     Depression     Depression     Diverticulosis of colon     Fibromyalgia     Hx of blood clots     DVT    Hyperlipidemia     Joint ache     Kidney failure     states begining stage 4 since summer '17    Lumbar disc disease     Lumbar disc disease     Migraine     Muscle spasm of back     Pleural effusion     Prolonged emergence from general anesthesia     SVT [facility]raventricular tachycardia) [facility]     Thyroid disease     HYPOTHYROIDISM    Varicose vein      Past Surgical History:   Procedure Laterality Date    COLONOSCOPY      HERNIA REPAIR      right inguinal and UMBILICAL-2 SURGERIES    HYSTERECTOMY      HYSTERECTOMY, VAGINAL      PAIN MANAGEMENT PROCEDURE Left 6/12/2020    LUMBAR EPIDURAL STEROID INJECTION LEFT L4-L5 performed by Magali Gamez MD at [address] N/A 11/11/2020    CHOLECYSTECTOMY AND SIGMOID COLECTOMY performed by Cole Santacruz MD at [address] [facility]         Review of Systems   Constitutional: Positive for activity change, appetite change, fatigue and fever. HENT: Positive for congestion. Respiratory: Positive for cough and chest tightness. Negative for shortness of breath. Cardiovascular: Negative for chest pain and palpitations. Gastrointestinal: Negative for abdominal pain, diarrhea, nausea and vomiting. Musculoskeletal: Positive for myalgias. Neurological: Positive for headaches. OBJECTIVE:  LMP  (LMP Unknown)   bp 120/78, pulse 75, o2 sat 95 %  Temp 99.1    There is no height or weight on file to calculate BMI. Physical Exam    ASSESSMENT/PLAN:    Debby Tobias was seen today for positive for covid-19. Diagnoses and all orders for this visit:    COVID set up for antibodies    Chronic pain syndrome chronic tramadol reviewed oarrs     Chronic renal failure, stage 2 (mild)        No orders of the defined types were placed in this encounter. No follow-ups on file. There are no Patient Instructions on file for this visit.

## 2021-12-06 NOTE — TELEPHONE ENCOUNTER
Patient has tested positive for covid, she has body aches, coughing. She is asking what can she do to feel better.     Please call, [phone]

## 2021-12-06 NOTE — TELEPHONE ENCOUNTER
Talked to the pt she tested Friday 12/3 and results came back today Positive. Body aches, neck hurts, head Congestion, Chest Congestion, headache, no appetite, low grade fever 99.     VV scheduled for today

## 2021-12-07 DIAGNOSIS — M51.9 LUMBAR DISC DISEASE: ICD-10-CM

## 2021-12-07 RX ORDER — BACLOFEN 10 MG/1
TABLET ORAL
Qty: 90 TABLET | Refills: 2 | Status: SHIPPED | OUTPATIENT
Start: 2021-12-07 | End: 2022-07-06

## 2021-12-12 ASSESSMENT — ENCOUNTER SYMPTOMS
CHEST TIGHTNESS: 1
DIARRHEA: 0
COUGH: 1
ABDOMINAL PAIN: 0
NAUSEA: 0
VOMITING: 0
SHORTNESS OF BREATH: 0

## 2021-12-24 DIAGNOSIS — F33.0 MILD EPISODE OF RECURRENT MAJOR DEPRESSIVE DISORDER (HCC): ICD-10-CM

## 2021-12-27 RX ORDER — CITALOPRAM 40 MG/1
TABLET ORAL
Qty: 90 TABLET | Refills: 1 | Status: SHIPPED | OUTPATIENT
Start: 2021-12-27 | End: 2022-11-02

## 2022-01-21 DIAGNOSIS — M47.812 ARTHRITIS OF NECK: ICD-10-CM

## 2022-01-21 DIAGNOSIS — M51.9 LUMBAR DISC DISEASE: ICD-10-CM

## 2022-01-21 DIAGNOSIS — G89.4 CHRONIC PAIN SYNDROME: ICD-10-CM

## 2022-01-24 RX ORDER — TRAMADOL HYDROCHLORIDE 50 MG/1
50 TABLET ORAL 2 TIMES DAILY PRN
Qty: 60 TABLET | Refills: 2 | Status: SHIPPED | OUTPATIENT
Start: 2022-01-24 | End: 2022-04-18

## 2022-02-01 RX ORDER — GABAPENTIN 600 MG/1
TABLET ORAL
Qty: 270 TABLET | Refills: 0 | Status: SHIPPED | OUTPATIENT
Start: 2022-02-01 | End: 2022-05-02

## 2022-03-17 ENCOUNTER — TELEPHONE (OUTPATIENT)
Dept: FAMILY MEDICINE CLINIC | Age: 72
End: 2022-03-17

## 2022-03-17 NOTE — TELEPHONE ENCOUNTER
Spoke with the patient and she said patient need the new handicap sticker , patient cannot walk far distance . Patient old sticker  year .

## 2022-03-17 NOTE — LETTER
[employer] [facility]  [address] [facility] [address]  Phone: [phone]  Fax: [phone]    Doug Abreu MD         March 17, 2022     Patient: Irma Medrano   YOB: 1950   Date of Visit: 3/17/2022       To Whom It May Concern: It is my medical opinion that Jessica Enriquez requires a disability parking placard for the following reasons:  She has limited walking ability due to an arthritic condition. Duration of need: permanent    If you have any questions or concerns, please don't hesitate to call.     Sincerely,        Doug Abreu MD

## 2022-04-18 DIAGNOSIS — G89.4 CHRONIC PAIN SYNDROME: ICD-10-CM

## 2022-04-18 DIAGNOSIS — M51.9 LUMBAR DISC DISEASE: ICD-10-CM

## 2022-04-18 DIAGNOSIS — M47.812 ARTHRITIS OF NECK: ICD-10-CM

## 2022-04-18 RX ORDER — MELATONIN
1000 DAILY
Qty: 30 TABLET | Refills: 5 | Status: SHIPPED | OUTPATIENT
Start: 2022-04-18 | End: 2022-05-23 | Stop reason: SDUPTHER

## 2022-04-18 RX ORDER — TRAMADOL HYDROCHLORIDE 50 MG/1
50 TABLET ORAL 2 TIMES DAILY PRN
Qty: 60 TABLET | Refills: 0 | Status: SHIPPED | OUTPATIENT
Start: 2022-04-19 | End: 2022-05-23

## 2022-04-18 RX ORDER — LEVOTHYROXINE SODIUM 0.03 MG/1
25 TABLET ORAL DAILY
Qty: 90 TABLET | Refills: 1 | Status: SHIPPED | OUTPATIENT
Start: 2022-04-18 | End: 2022-10-13

## 2022-05-02 RX ORDER — GABAPENTIN 600 MG/1
TABLET ORAL
Qty: 270 TABLET | Refills: 0 | Status: SHIPPED | OUTPATIENT
Start: 2022-05-02 | End: 2022-08-03

## 2022-05-04 RX ORDER — ASPIRIN 81 MG/1
TABLET, COATED ORAL
Qty: 90 TABLET | Refills: 1 | Status: SHIPPED | OUTPATIENT
Start: 2022-05-04 | End: 2022-05-23

## 2022-05-12 DIAGNOSIS — E55.9 VITAMIN D DEFICIENCY: ICD-10-CM

## 2022-05-12 DIAGNOSIS — E03.9 ACQUIRED HYPOTHYROIDISM: ICD-10-CM

## 2022-05-12 DIAGNOSIS — E78.00 HYPERCHOLESTEREMIA: Primary | ICD-10-CM

## 2022-05-12 RX ORDER — LOVASTATIN 40 MG/1
TABLET ORAL
Qty: 90 TABLET | Refills: 1 | Status: SHIPPED | OUTPATIENT
Start: 2022-05-12

## 2022-05-17 NOTE — TELEPHONE ENCOUNTER
Spoke with patient daughter J Carlos Griffith and informed . She said patient will be calling us back to make scheduled for medicare wellness and to have the lab work done .

## 2022-05-21 DIAGNOSIS — M47.812 ARTHRITIS OF NECK: ICD-10-CM

## 2022-05-21 DIAGNOSIS — G89.4 CHRONIC PAIN SYNDROME: ICD-10-CM

## 2022-05-21 DIAGNOSIS — M51.9 LUMBAR DISC DISEASE: ICD-10-CM

## 2022-05-23 ENCOUNTER — OFFICE VISIT (OUTPATIENT)
Dept: FAMILY MEDICINE CLINIC | Age: 72
End: 2022-05-23
Payer: MEDICARE

## 2022-05-23 VITALS
SYSTOLIC BLOOD PRESSURE: 126 MMHG | OXYGEN SATURATION: 96 % | TEMPERATURE: 98.6 F | DIASTOLIC BLOOD PRESSURE: 66 MMHG | BODY MASS INDEX: 26.15 KG/M2 | WEIGHT: 162 LBS | HEART RATE: 75 BPM | RESPIRATION RATE: 16 BRPM

## 2022-05-23 DIAGNOSIS — E78.00 HYPERCHOLESTEREMIA: ICD-10-CM

## 2022-05-23 DIAGNOSIS — Z12.31 ENCOUNTER FOR SCREENING MAMMOGRAM FOR MALIGNANT NEOPLASM OF BREAST: ICD-10-CM

## 2022-05-23 DIAGNOSIS — J44.9 CHRONIC OBSTRUCTIVE PULMONARY DISEASE, UNSPECIFIED COPD TYPE (HCC): ICD-10-CM

## 2022-05-23 DIAGNOSIS — E55.9 VITAMIN D DEFICIENCY: ICD-10-CM

## 2022-05-23 DIAGNOSIS — I47.1 SVT (SUPRAVENTRICULAR TACHYCARDIA) (HCC): ICD-10-CM

## 2022-05-23 DIAGNOSIS — M51.9 LUMBAR DISC DISEASE: ICD-10-CM

## 2022-05-23 DIAGNOSIS — E03.9 ACQUIRED HYPOTHYROIDISM: ICD-10-CM

## 2022-05-23 DIAGNOSIS — N18.9 ACUTE KIDNEY INJURY SUPERIMPOSED ON CHRONIC KIDNEY DISEASE (HCC): ICD-10-CM

## 2022-05-23 DIAGNOSIS — G89.4 CHRONIC PAIN SYNDROME: Primary | ICD-10-CM

## 2022-05-23 DIAGNOSIS — F33.0 MILD EPISODE OF RECURRENT MAJOR DEPRESSIVE DISORDER (HCC): ICD-10-CM

## 2022-05-23 DIAGNOSIS — N17.9 ACUTE KIDNEY INJURY SUPERIMPOSED ON CHRONIC KIDNEY DISEASE (HCC): ICD-10-CM

## 2022-05-23 LAB
A/G RATIO: 1.9 (ref 1.1–2.2)
ALBUMIN SERPL-MCNC: 4 G/DL (ref 3.4–5)
ALP BLD-CCNC: 115 U/L (ref 40–129)
ALT SERPL-CCNC: 10 U/L (ref 10–40)
ANION GAP SERPL CALCULATED.3IONS-SCNC: 15 MMOL/L (ref 3–16)
AST SERPL-CCNC: 14 U/L (ref 15–37)
BILIRUB SERPL-MCNC: <0.2 MG/DL (ref 0–1)
BUN BLDV-MCNC: 17 MG/DL (ref 7–20)
CALCIUM SERPL-MCNC: 9.2 MG/DL (ref 8.3–10.6)
CHLORIDE BLD-SCNC: 99 MMOL/L (ref 99–110)
CHOLESTEROL, TOTAL: 190 MG/DL (ref 0–199)
CO2: 25 MMOL/L (ref 21–32)
CREAT SERPL-MCNC: 1.4 MG/DL (ref 0.6–1.2)
GFR AFRICAN AMERICAN: 45
GFR NON-AFRICAN AMERICAN: 37
GLUCOSE BLD-MCNC: 86 MG/DL (ref 70–99)
HDLC SERPL-MCNC: 57 MG/DL (ref 40–60)
LDL CHOLESTEROL CALCULATED: 113 MG/DL
POTASSIUM SERPL-SCNC: 4.8 MMOL/L (ref 3.5–5.1)
SODIUM BLD-SCNC: 139 MMOL/L (ref 136–145)
TOTAL PROTEIN: 6.1 G/DL (ref 6.4–8.2)
TRIGL SERPL-MCNC: 102 MG/DL (ref 0–150)
TSH SERPL DL<=0.05 MIU/L-ACNC: 2.44 UIU/ML (ref 0.27–4.2)
VITAMIN D 25-HYDROXY: 42.8 NG/ML
VLDLC SERPL CALC-MCNC: 20 MG/DL

## 2022-05-23 PROCEDURE — G8400 PT W/DXA NO RESULTS DOC: HCPCS | Performed by: INTERNAL MEDICINE

## 2022-05-23 PROCEDURE — 1123F ACP DISCUSS/DSCN MKR DOCD: CPT | Performed by: INTERNAL MEDICINE

## 2022-05-23 PROCEDURE — G8427 DOCREV CUR MEDS BY ELIG CLIN: HCPCS | Performed by: INTERNAL MEDICINE

## 2022-05-23 PROCEDURE — 3017F COLORECTAL CA SCREEN DOC REV: CPT | Performed by: INTERNAL MEDICINE

## 2022-05-23 PROCEDURE — 1090F PRES/ABSN URINE INCON ASSESS: CPT | Performed by: INTERNAL MEDICINE

## 2022-05-23 PROCEDURE — 4004F PT TOBACCO SCREEN RCVD TLK: CPT | Performed by: INTERNAL MEDICINE

## 2022-05-23 PROCEDURE — 99214 OFFICE O/P EST MOD 30 MIN: CPT | Performed by: INTERNAL MEDICINE

## 2022-05-23 PROCEDURE — G8417 CALC BMI ABV UP PARAM F/U: HCPCS | Performed by: INTERNAL MEDICINE

## 2022-05-23 PROCEDURE — 3023F SPIROM DOC REV: CPT | Performed by: INTERNAL MEDICINE

## 2022-05-23 RX ORDER — ALBUTEROL SULFATE 90 UG/1
AEROSOL, METERED RESPIRATORY (INHALATION)
Qty: 6.7 G | Refills: 3 | Status: SHIPPED | OUTPATIENT
Start: 2022-05-23 | End: 2022-05-23

## 2022-05-23 RX ORDER — ALBUTEROL SULFATE 90 UG/1
AEROSOL, METERED RESPIRATORY (INHALATION)
Qty: 20.1 G | Refills: 0 | Status: SHIPPED | OUTPATIENT
Start: 2022-05-23

## 2022-05-23 RX ORDER — TRAMADOL HYDROCHLORIDE 50 MG/1
50 TABLET ORAL 2 TIMES DAILY PRN
Qty: 60 TABLET | Refills: 0 | Status: SHIPPED | OUTPATIENT
Start: 2022-05-23 | End: 2022-06-20

## 2022-05-23 RX ORDER — MELATONIN
1000 DAILY
Qty: 30 TABLET | Refills: 5 | Status: SHIPPED | OUTPATIENT
Start: 2022-05-23

## 2022-05-23 NOTE — PROGRESS NOTES
SUBJECTIVE:  Sol Strong is a 71 y.o. female being evaluated for:    Chief Complaint   Patient presents with    6 Month Follow-Up     htn, tsh, chronic pain        HPI  Feeling more tired than usual  Not sleeping well  Chronic pain in her low back Legs numb at times worse in her left side  NO trouble urinating  MRI with degernartive changes is spine    NO further double visiotn   Allergies   Allergen Reactions    Ciprofloxacin Nausea And Vomiting     SEVERE    Nsaids      Kidney disease    Abilify [Aripiprazole] Itching    Benadryl [Diphenhydramine] Other (See Comments)     COMPLETTELY SEDATES HER-PER DAUGHTER    Darvon [Propoxyphene] Rash    Dilaudid [Hydromorphone Hcl] Itching     CAN TOLERATE 1 DOSE IF NOT GIVEN BACK TO BACK-    Hydrocodone-Acetaminophen Nausea And Vomiting     8/23/2020--pt states that only the hydrodocone-acetaminophen combination causes a reaction, but pt doesn't react to acetaminophen by Domitila Ambrocio RN    Morphine Itching and Nausea And Vomiting    Percocet [Oxycodone-Acetaminophen] Other (See Comments)     Makes her loopy,HALLUCINATIONS    Trintellix [Vortioxetine] Other (See Comments)     Hands/feet jittery, nausea     Current Outpatient Medications   Medication Sig Dispense Refill    traMADol (ULTRAM) 50 MG tablet Take 1 tablet by mouth 2 times daily as needed for Pain for up to 30 days.  60 tablet 0    umeclidinium-vilanterol (ANORO ELLIPTA) 62.5-25 MCG/INH AEPB inhaler Inhale 1 puff into the lungs daily 1 each 5    vitamin D3 (CHOLECALCIFEROL) 25 MCG (1000 UT) TABS tablet Take 1 tablet by mouth daily 30 tablet 5    lovastatin (MEVACOR) 40 MG tablet TAKE 1 TABLET BY MOUTH EVERY NIGHT 90 tablet 1    gabapentin (NEURONTIN) 600 MG tablet TAKE 1 TABLET BY MOUTH THREE TIMES DAILY 270 tablet 0    levothyroxine (SYNTHROID) 25 MCG tablet TAKE 1 TABLET BY MOUTH DAILY 90 tablet 1    citalopram (CELEXA) 40 MG tablet TAKE 1 TABLET BY MOUTH DAILY 90 tablet 1    baclofen (LIORESAL) 10 MG tablet TAKE 1 TABLET BY MOUTH THREE TIMES DAILY AS NEEDED FOR MUSCLE SPASM 90 tablet 2    promethazine (PHENERGAN) 12.5 MG tablet Take 1 tablet by mouth every 8 hours as needed for Nausea TAKE 1 TABLET BY MOUTH EVERY DAY AS NEEDED 90 tablet 0    acetaminophen (TYLENOL) 500 MG tablet Take 500 mg by mouth every 6 hours as needed       albuterol (PROVENTIL) (2.5 MG/3ML) 0.083% nebulizer solution Take 3 mLs by nebulization every 6 hours as needed for Wheezing 120 each 0    dicyclomine (BENTYL) 10 MG capsule Take 1 capsule by mouth 4 times daily as needed (abdominal cramping and diarrhea) 120 capsule 1    albuterol sulfate  (90 Base) MCG/ACT inhaler INHALE 2 PUFFS BY MOUTH EVERY 6 HOURS AS NEEDED FOR WHEEZING 20.1 g 0     No current facility-administered medications for this visit. Social History     Socioeconomic History    Marital status:      Spouse name: Not on file    Number of children: 2    Years of education: Not on file    Highest education level: Not on file   Occupational History    Occupation: NA   Tobacco Use    Smoking status: Current Every Day Smoker     Packs/day: 0.33     Years: 45.00     Pack years: 14.85     Types: Cigarettes     Start date: 8/16/2014    Smokeless tobacco: Never Used    Tobacco comment: would like to quit   Vaping Use    Vaping Use: Never used   Substance and Sexual Activity    Alcohol use: No     Alcohol/week: 0.0 standard drinks    Drug use: Never    Sexual activity: Never   Other Topics Concern    Not on file   Social History Narrative    Not on file     Social Determinants of Health     Financial Resource Strain: Low Risk     Difficulty of Paying Living Expenses: Not hard at all   Food Insecurity: No Food Insecurity    Worried About Running Out of Food in the Last Year: Never true    Dora of Food in the Last Year: Never true   Transportation Needs: No Transportation Needs    Lack of Transportation (Medical):  No    Lack of Transportation (Non-Medical):  No   Physical Activity:     Days of Exercise per Week: Not on file    Minutes of Exercise per Session: Not on file   Stress:     Feeling of Stress : Not on file   Social Connections:     Frequency of Communication with Friends and Family: Not on file    Frequency of Social Gatherings with Friends and Family: Not on file    Attends Anabaptist Services: Not on file    Active Member of Clubs or Organizations: Not on file    Attends Club or Organization Meetings: Not on file    Marital Status: Not on file   Intimate Partner Violence:     Fear of Current or Ex-Partner: Not on file    Emotionally Abused: Not on file    Physically Abused: Not on file    Sexually Abused: Not on file   Housing Stability:     Unable to Pay for Housing in the Last Year: Not on file    Number of [place] in the Last Year: Not on file    Unstable Housing in the Last Year: Not on file      Past Medical History:   Diagnosis Date    Anxiety     Cervical herniated disc     Circulation problem     COPD (chronic obstructive pulmonary disease) ([address]     CRF (chronic renal failure), stage 3 (moderate) ([address]     Depression     Depression     Diverticulosis of colon     Fibromyalgia     Hx of blood clots     DVT    Hyperlipidemia     Joint ache     Kidney failure     states begining stage 4 since summer '17    Lumbar disc disease     Lumbar disc disease     Migraine     Muscle spasm of back     Pleural effusion     Prolonged emergence from general anesthesia     SVT (supraventricular tachycardia) ([address]     Thyroid disease     HYPOTHYROIDISM    Varicose vein      Past Surgical History:   Procedure Laterality Date    COLONOSCOPY      HERNIA REPAIR      right inguinal and UMBILICAL-2 SURGERIES    HYSTERECTOMY      HYSTERECTOMY, VAGINAL      PAIN MANAGEMENT PROCEDURE Left 6/12/2020    LUMBAR EPIDURAL STEROID INJECTION LEFT L4-L5 performed by William Garcia MD at [facility]    SMALL INTESTINE SURGERY N/A 11/11/2020    CHOLECYSTECTOMY AND SIGMOID COLECTOMY performed by Oleg Nieto MD at [facility]         Review of Systems   Constitutional: Positive for unexpected weight change (slolwy increasing ). Negative for activity change and fatigue. HENT: Negative for nosebleeds. Eyes: Negative for visual disturbance. Respiratory: Positive for cough (chronic unchanged) and shortness of breath. Cardiovascular: Negative for chest pain, palpitations and leg swelling. Gastrointestinal: Negative for abdominal pain, blood in stool, constipation, diarrhea, nausea and vomiting. Endocrine: Positive for cold intolerance. Negative for heat intolerance. Genitourinary: Negative for difficulty urinating and dysuria. Musculoskeletal: Positive for arthralgias, back pain and gait problem. Neurological: Positive for numbness (left sided ) and headaches. Negative for dizziness, syncope, weakness and light-headedness. Psychiatric/Behavioral: Positive for sleep disturbance. Negative for dysphoric mood. The patient is not nervous/anxious. OBJECTIVE:  /66 (Site: Left Upper Arm, Position: Sitting, Cuff Size: Medium Adult)   Pulse 75   Temp 98.6 °F (37 °C) (Oral)   Resp 16   Wt 162 lb (73.5 kg)   LMP  (LMP Unknown)   SpO2 96%   BMI 26.15 kg/m²      Body mass index is 26.15 kg/m². Physical Exam  Vitals and nursing note reviewed. Constitutional:       General: She is not in acute distress. Appearance: Normal appearance. She is well-developed. HENT:      Head: Normocephalic and atraumatic. Mouth/Throat:      Pharynx: Oropharynx is clear. Eyes:      Conjunctiva/sclera: Conjunctivae normal.   Neck:      Thyroid: No thyromegaly. Vascular: No carotid bruit. Comments: Thyroid nodule   Cardiovascular:      Rate and Rhythm: Normal rate and regular rhythm. Heart sounds: Normal heart sounds.  No murmur heard.  No friction rub. No gallop. Pulmonary:      Effort: Pulmonary effort is normal.      Breath sounds: Normal breath sounds. Chest:      Chest wall: No tenderness. Abdominal:      General: Bowel sounds are normal. There is no distension. Palpations: Abdomen is soft. Tenderness: There is no abdominal tenderness. Comments: No hepatosplenomegaly   Musculoskeletal:         General: Tenderness (Low back and upper back ) present. No swelling. Lymphadenopathy:      Cervical: No cervical adenopathy. Skin:     General: Skin is warm and dry. Neurological:      General: No focal deficit present. Mental Status: She is alert. Motor: No abnormal muscle tone. Gait: Gait normal.   Psychiatric:         Mood and Affect: Mood normal.         Behavior: Behavior normal.         Thought Content: Thought content normal.         ASSESSMENT/PLAN:    Da Chan was seen today for 6 month follow-up. Diagnoses and all orders for this visit:    Chronic pain syndrome not sure tramadol is helping get in with pain management   -     JOEL Sierra MD, Pain Management, [facility]    Lumbar disc disease  -     JOEL Sierra MD, Pain Management, [facility]    Acquired hypothyroidism normal tsh    Hypercholesteremia ok profile     Mild episode of recurrent major depressive disorder ([address]    Acute kidney injury superimposed on chronic kidney disease ([address] worsening rf  Avoid all nsaids Drink water and recheck if remains up needs to see nephrology   -     Renal Function Panel; Future    Chronic obstructive pulmonary disease, unspecified COPD type ([address]  -     umeclidinium-vilanterol (ANORO ELLIPTA) 62.5-25 MCG/INH AEPB inhaler;  Inhale 1 puff into the lungs daily        -     albuterol sulfate HFA (PROVENTIL HFA) 108 (90 Base) MCG/ACT inhaler; INHALE 2 PUFFS BY MOUTH EVERY 6 HOURS AS NEEDED FOR WHEEZING    SVT (supraventricular tachycardia) (HCC) no problems noted    Encounter for screening mammogram for malignant neoplasm of breast  -     [facility] DIGITAL SCREEN W OR WO CAD BILATERAL; Future    Vitamin d def normal vitamin d   -     vitamin D3 (CHOLECALCIFEROL) 25 MCG (1000 UT) TABS tablet; Take 1 tablet by mouth daily        Orders Placed This Encounter   Medications    albuterol sulfate HFA (PROVENTIL HFA) 108 (90 Base) MCG/ACT inhaler     Sig: INHALE 2 PUFFS BY MOUTH EVERY 6 HOURS AS NEEDED FOR WHEEZING     Dispense:  6.7 g     Refill:  3    umeclidinium-vilanterol (ANORO ELLIPTA) 62.5-25 MCG/INH AEPB inhaler     Sig: Inhale 1 puff into the lungs daily     Dispense:  1 each     Refill:  5    vitamin D3 (CHOLECALCIFEROL) 25 MCG (1000 UT) TABS tablet     Sig: Take 1 tablet by mouth daily     Dispense:  30 tablet     Refill:  5        Return in about 6 months (around 11/23/2022), or if symptoms worsen or fail to improve, for pain follow up . There are no Patient Instructions on file for this visit.

## 2022-05-30 ASSESSMENT — ENCOUNTER SYMPTOMS
ABDOMINAL PAIN: 0
BLOOD IN STOOL: 0
CONSTIPATION: 0
COUGH: 1
NAUSEA: 0
VOMITING: 0
BACK PAIN: 1
SHORTNESS OF BREATH: 1
DIARRHEA: 0

## 2022-06-18 ENCOUNTER — APPOINTMENT (OUTPATIENT)
Dept: CT IMAGING | Age: 72
End: 2022-06-18
Payer: MEDICARE

## 2022-06-18 ENCOUNTER — APPOINTMENT (OUTPATIENT)
Dept: GENERAL RADIOLOGY | Age: 72
End: 2022-06-18
Payer: MEDICARE

## 2022-06-18 ENCOUNTER — HOSPITAL ENCOUNTER (EMERGENCY)
Age: 72
Discharge: HOME | End: 2022-06-18
Attending: EMERGENCY MEDICINE
Payer: MEDICARE

## 2022-06-18 DIAGNOSIS — T14.8XXA ABRASION: ICD-10-CM

## 2022-06-18 DIAGNOSIS — S02.0XXB OPEN FRACTURE OF FRONTAL BONE, INITIAL ENCOUNTER (HCC): ICD-10-CM

## 2022-06-18 DIAGNOSIS — S50.01XA CONTUSION OF RIGHT ELBOW, INITIAL ENCOUNTER: ICD-10-CM

## 2022-06-18 DIAGNOSIS — S01.81XA FACIAL LACERATION, INITIAL ENCOUNTER: ICD-10-CM

## 2022-06-18 DIAGNOSIS — S09.90XA INJURY OF HEAD, INITIAL ENCOUNTER: Primary | ICD-10-CM

## 2022-06-18 PROCEDURE — 90471 IMMUNIZATION ADMIN: CPT | Performed by: EMERGENCY MEDICINE

## 2022-06-18 PROCEDURE — 6370000000 HC RX 637 (ALT 250 FOR IP): Performed by: EMERGENCY MEDICINE

## 2022-06-18 PROCEDURE — 70450 CT HEAD/BRAIN W/O DYE: CPT

## 2022-06-18 PROCEDURE — 99284 EMERGENCY DEPT VISIT MOD MDM: CPT

## 2022-06-18 PROCEDURE — 90715 TDAP VACCINE 7 YRS/> IM: CPT | Performed by: EMERGENCY MEDICINE

## 2022-06-18 PROCEDURE — 12011 RPR F/E/E/N/L/M 2.5 CM/<: CPT

## 2022-06-18 PROCEDURE — 73080 X-RAY EXAM OF ELBOW: CPT

## 2022-06-18 PROCEDURE — 6360000002 HC RX W HCPCS: Performed by: EMERGENCY MEDICINE

## 2022-06-18 RX ORDER — CEPHALEXIN 500 MG/1
500 CAPSULE ORAL ONCE
Status: COMPLETED | OUTPATIENT
Start: 2022-06-18 | End: 2022-06-18

## 2022-06-18 RX ORDER — CEPHALEXIN 500 MG/1
500 CAPSULE ORAL 3 TIMES DAILY
Qty: 21 CAPSULE | Refills: 0 | Status: SHIPPED | OUTPATIENT
Start: 2022-06-18 | End: 2022-06-25

## 2022-06-18 RX ADMIN — TETANUS TOXOID, REDUCED DIPHTHERIA TOXOID AND ACELLULAR PERTUSSIS VACCINE, ADSORBED 0.5 ML: 5; 2.5; 8; 8; 2.5 SUSPENSION INTRAMUSCULAR at 21:32

## 2022-06-18 RX ADMIN — CEPHALEXIN 500 MG: 500 CAPSULE ORAL at 23:23

## 2022-06-18 ASSESSMENT — PAIN DESCRIPTION - LOCATION
LOCATION: HEAD

## 2022-06-18 ASSESSMENT — PAIN SCALES - GENERAL
PAINLEVEL_OUTOF10: 8
PAINLEVEL_OUTOF10: 7
PAINLEVEL_OUTOF10: 7

## 2022-06-18 ASSESSMENT — PAIN DESCRIPTION - ORIENTATION: ORIENTATION: RIGHT

## 2022-06-18 ASSESSMENT — PAIN DESCRIPTION - PAIN TYPE
TYPE: ACUTE PAIN
TYPE: ACUTE PAIN

## 2022-06-18 ASSESSMENT — PAIN DESCRIPTION - FREQUENCY: FREQUENCY: CONTINUOUS

## 2022-06-18 ASSESSMENT — PAIN DESCRIPTION - DESCRIPTORS
DESCRIPTORS: THROBBING
DESCRIPTORS: POUNDING

## 2022-06-19 VITALS
WEIGHT: 162.7 LBS | DIASTOLIC BLOOD PRESSURE: 82 MMHG | BODY MASS INDEX: 26.15 KG/M2 | TEMPERATURE: 97.5 F | RESPIRATION RATE: 16 BRPM | SYSTOLIC BLOOD PRESSURE: 188 MMHG | HEIGHT: 66 IN | OXYGEN SATURATION: 97 % | HEART RATE: 72 BPM

## 2022-06-19 NOTE — ED NOTES
Right knee abrasion cleansed with NS 0.9% and CHG solution. Bacitracin ointment and Band-Aid applied to forehead stitches and Knee abrasion. Pt tolerated well.       Za Lawrence RN  06/18/22 [id number]

## 2022-06-19 NOTE — ED NOTES
Right arm ace wrapped per order. Pt states comfort fit and tolerated well. Daughter remains at bedside.       Justa Cardenas RN  06/18/22 [id number]

## 2022-06-19 NOTE — ED PROVIDER NOTES
CHIEF COMPLAINT  Chief Complaint   Patient presents with    Head Laceration     Pt fell on concrete about 8:30 tonight. 7/10 pain. HISTORY OF PRESENT ILLNESS  Tyler Dhaliwal is a 71 y.o. female who presents to the ED complaining of tripping on outside steps and falling down 2 steps hitting her right supraorbital ridge causing a laceration. Tetanus status is more than 5 years ago. Patient denies any loss of consciousness, amnesia, nausea, vomiting, lightheadedness, dizziness, focal neurologic deficits, paresthesias, confusion or ataxia. Patient denies injury to the nose or mouth with no epistaxis, otorrhea or rhinorrhea. No neck or back pain. No chest pain or shortness of breath. No presyncope. No palpitations. No abdominal or pelvic pain. Patient denies pain of the upper or lower extremities except for her right lateral elbow but has full range of motion. Patient does report an abrasion to the right anterior knee with no significant pain of the knee with ambulation. No other complaints, modifying factors or associated symptoms. Nursing notes reviewed.    Past Medical History:   Diagnosis Date    Anxiety     Cervical herniated disc     Circulation problem     COPD [facility]     CRF (chronic renal failure), stage 3 (moderate) ([facility]     Depression     Depression     Diverticulosis of colon     Fibromyalgia     Hx of blood clots     DVT    Hyperlipidemia     Joint ache     Kidney failure     states begining stage 4 since summer '17    Lumbar disc disease     Lumbar disc disease     Migraine     Muscle spasm of back     Pleural effusion     Prolonged emergence from general anesthesia     SVT (supraventricular tachycardia) [facility]     Thyroid disease     HYPOTHYROIDISM    Varicose vein      Past Surgical History:   Procedure Laterality Date    COLONOSCOPY      HERNIA REPAIR      right inguinal and UMBILICAL-2 SURGERIES    HYSTERECTOMY      HYSTERECTOMY, VAGINAL      PAIN MANAGEMENT PROCEDURE Left 6/12/2020    LUMBAR EPIDURAL STEROID INJECTION LEFT L4-L5 performed by Reid France MD at [address] N/A 11/11/2020    CHOLECYSTECTOMY AND SIGMOID COLECTOMY performed by Carlton Roberson MD at [address]       Family History   Problem Relation Age of Onset    Hypertension Mother     Heart Failure Mother     Cancer Mother         ovarian     Stroke Father     Asthma Father     Heart Failure Brother     Cancer Brother         lung    Diabetes Brother     Heart Disease Brother     Kidney Disease Brother     Other Brother         liver disease     Social History     Socioeconomic History    Marital status:      Spouse name: Not on file    Number of children: 2    Years of education: Not on file    Highest education level: Not on file   Occupational History    Occupation: NA   Tobacco Use    Smoking status: Current Every Day Smoker     Packs/day: 0.33     Years: 45.00     Pack years: 14.85     Types: Cigarettes     Start date: 8/16/2014    Smokeless tobacco: Never Used    Tobacco comment: would like to quit   Vaping Use    Vaping Use: Never used   Substance and Sexual Activity    Alcohol use: No     Alcohol/week: 0.0 standard drinks    Drug use: Never    Sexual activity: Never   Other Topics Concern    Not on file   Social History Narrative    Not on file     Social Determinants of Health     Financial Resource Strain: Low Risk     Difficulty of Paying Living Expenses: Not hard at all   Food Insecurity: No Food Insecurity    Worried About Running Out of Food in the Last Year: Never true    Dora of Food in the Last Year: Never true   Transportation Needs: No Transportation Needs    Lack of Transportation (Medical): No    Lack of Transportation (Non-Medical):  No   Physical Activity:     Days of Exercise per Week: Not on file    Minutes of Exercise per Session: Not on file   Stress:     Feeling of Stress : Not on file   Social Connections:     Frequency of Communication with Friends and Family: Not on file    Frequency of Social Gatherings with Friends and Family: Not on file    Attends Muslim Services: Not on file    Active Member of [address] [place] [address] or Organizations: Not on file    Attends Club or Organization Meetings: Not on file    Marital Status: Not on file   Intimate Partner Violence:     Fear of Current or Ex-Partner: Not on file    Emotionally Abused: Not on file    Physically Abused: Not on file    Sexually Abused: Not on file   Housing Stability:     Unable to Pay for Housing in the Last Year: Not on file    Number of Alejandramogiana in the Last Year: Not on file    Unstable Housing in the Last Year: Not on file     Current Facility-Administered Medications   Medication Dose Route Frequency Provider Last Rate Last Admin    cephALEXin (KEFLEX) capsule 500 mg  500 mg Oral Once Clemente Watkins MD         Current Outpatient Medications   Medication Sig Dispense Refill    cephALEXin (KEFLEX) 500 MG capsule Take 1 capsule by mouth 3 times daily for 7 days 21 capsule 0    traMADol (ULTRAM) 50 MG tablet Take 1 tablet by mouth 2 times daily as needed for Pain for up to 30 days.  60 tablet 0    umeclidinium-vilanterol (ANORO ELLIPTA) 62.5-25 MCG/INH AEPB inhaler Inhale 1 puff into the lungs daily 1 each 5    vitamin D3 (CHOLECALCIFEROL) 25 MCG (1000 UT) TABS tablet Take 1 tablet by mouth daily 30 tablet 5    albuterol sulfate  (90 Base) MCG/ACT inhaler INHALE 2 PUFFS BY MOUTH EVERY 6 HOURS AS NEEDED FOR WHEEZING 20.1 g 0    lovastatin (MEVACOR) 40 MG tablet TAKE 1 TABLET BY MOUTH EVERY NIGHT 90 tablet 1    gabapentin (NEURONTIN) 600 MG tablet TAKE 1 TABLET BY MOUTH THREE TIMES DAILY 270 tablet 0    levothyroxine (SYNTHROID) 25 MCG tablet TAKE 1 TABLET BY MOUTH DAILY 90 tablet 1    citalopram (CELEXA) 40 MG tablet TAKE 1 TABLET BY MOUTH DAILY 90 tablet 1    baclofen (LIORESAL) 10 MG tablet TAKE 1 TABLET BY MOUTH THREE TIMES DAILY AS NEEDED FOR MUSCLE SPASM 90 tablet 2    promethazine (PHENERGAN) 12.5 MG tablet Take 1 tablet by mouth every 8 hours as needed for Nausea TAKE 1 TABLET BY MOUTH EVERY DAY AS NEEDED 90 tablet 0    acetaminophen (TYLENOL) 500 MG tablet Take 500 mg by mouth every 6 hours as needed       albuterol (PROVENTIL) (2.5 MG/3ML) 0.083% nebulizer solution Take 3 mLs by nebulization every 6 hours as needed for Wheezing 120 each 0    dicyclomine (BENTYL) 10 MG capsule Take 1 capsule by mouth 4 times daily as needed (abdominal cramping and diarrhea) 120 capsule 1     Allergies   Allergen Reactions    Ciprofloxacin Nausea And Vomiting     SEVERE    Nsaids      Kidney disease    Abilify [Aripiprazole] Itching    Benadryl [Diphenhydramine] Other (See Comments)     COMPLETTELY SEDATES HER-PER DAUGHTER    Darvon [Propoxyphene] Rash    Dilaudid [Hydromorphone Hcl] Itching     CAN TOLERATE 1 DOSE IF NOT GIVEN BACK TO BACK-    Hydrocodone-Acetaminophen Nausea And Vomiting     8/23/2020--pt states that only the hydrodocone-acetaminophen combination causes a reaction, but pt doesn't react to acetaminophen by Kristine Correia RN    Morphine Itching and Nausea And Vomiting    Percocet [Oxycodone-Acetaminophen] Other (See Comments)     Makes her loopy,HALLUCINATIONS    Trintellix [Vortioxetine] Other (See Comments)     Hands/feet jittery, nausea       REVIEW OF SYSTEMS  Positives and pertinent negatives as per HPI. Ten other systems were reviewed and are negative. Nursing notes pertaining to ROS were reviewed. PHYSICAL EXAM   BP (!) 193/77 Comment: will inform MD   Pulse 72   Temp 97.5 °F (36.4 °C) (Tympanic)   Resp 16   Ht 5' 6\" (1.676 m)   Wt 162 lb 11.2 oz (73.8 kg)   LMP  (LMP Unknown)   SpO2 95%   BMI 26.26 kg/m²   General: Alert and oriented x 3, NAD.   No increased work of breathing or accessory muscle use.  Non-ill appearing. Appropriate and interactive  Eyes: PERRL, no scleral icterus, injection or exudate. EOMI. HENT: 2 cm vertical linear laceration over the right lateral supraorbital ridge with minimal underlying hematoma and no bony depression or step-off. Oral pharynx is clear, moist, no enanthem. No tonsillar hypertropy or exudate. Nasal mucous membranes are clear. TM's are clear without evidence of otitis media. No otorrhea or rhinorrhea. No epistaxis. No Doran's or raccoon sign. No trismus. No mandible tenderness. No other facial tenderness to palpation. Neck:  No Lymphadenopathy. Non-tender to palpation. Normal ROM. No JVD. No thyromegaly. No Mass. PULMONARY: Lungs clear bilaterally without wheezes, rales or rhonchi. Good air movement bilaterally. CV: Regular rate and rhythm without murmurs, rubs or gallops. ABD: Soft, non-tender, non-distended, normal bowel sounds, no hepatosplenomegaly, no masses. No peritoneal signs, rebound or guarding. Back:  No CVAT, no rash. Nontender to palpation along the spinous processes or paraspinal muscles. Pelvis is stable and nontender to pelvic rock. EXT: No cyanosis or clubbing. No rash. CR < 2 seconds. No tenderness to palpation of the bilateral shoulders, left elbow, bilateral wrists, hands, hips, knees, ankles. Patient has minimal tenderness to palpation over her right proximal elbow over the radial head but she has full range of motion with supination, pronation, flexion, extension with no pain with range of motion and normal sensation to light touch of the entire right and left upper extremity. Patient has a superficial abrasion over her right anterior knee but there is no evidence of knee effusion. No tenderness over the patella, patellar or quadriceps tendon. No tenderness over the popliteal fossa. Patient has no abnormal laxity of the MCL, LCL with no pain with stress testing.   Patient has normal anterior drawer/posterior drawer/Lachman of the right knee. Normal extensor apparatus function of the right knee. No lower extremity edema. +2 pulses in upper/lower extremities bilaterally. Skin is warm and dry. PSYCH: normal affect  NEURO: Alert and oriented x 3, NAD. Interactive. GCS 15.  CN 2-12 are intact. PERRL. EOMI. Visual fields are normal.    5 of 5 LE strength that is bilaterally symmetric.  5 of 5 UE strength that is bilaterally symmetric. Normal sensation to light touch of the UE and LE.  2/4 DTR of the biceps, patellar and Achilles tendons. No clonus. Normal Romberg without pronator drift. Normal finger to nose testing without past pointing. No ataxia or truncal instability. RADIOLOGY    CT Head WO Contrast   Final Result   Acute fracture right frontal bone involving both the inner and outer tables   of the calvarium with minimal irregularity and step-off of the cortical   margin series 3, image 15 for example with minimal overlying soft tissue   contusion and laceration with minimal soft tissue gas locules however no   scalp hematoma. No associated extra-axial fluid collection or hemorrhage. XR ELBOW RIGHT (MIN 3 VIEWS)   Final Result   No acute osseous findings of the right elbow in anatomic alignment. No   effusion               Lac Repair    Date/Time: 6/18/2022 10:00 PM  Performed by: Brijesh Packer MD  Authorized by: Brijesh Packer MD     Consent:     Consent obtained:  Verbal    Consent given by:  Patient    Risks discussed:  Infection, pain, poor cosmetic result, poor wound healing and need for additional repair    Alternatives discussed:  No treatment  Anesthesia (see MAR for exact dosages): Anesthesia method:  Local infiltration    Local anesthetic:  Lidocaine 1% w/o epi  Laceration details:     Location:  Face    Face location:  R eyebrow    Length (cm):  2    Depth (mm):  4  Repair type:     Repair type:   Intermediate  Pre-procedure details:     Preparation: Patient was prepped and draped in usual sterile fashion  Exploration:     Hemostasis achieved with:  Direct pressure    Wound exploration: entire depth of wound probed and visualized      Contaminated: no    Treatment:     Area cleansed with:  Hibiclens    Amount of cleaning:  Standard    Irrigation solution:  Sterile water    Irrigation volume:  200    Irrigation method:  Syringe  Skin repair:     Repair method:  Sutures    Suture size:  5-0    Suture material:  Nylon    Suture technique:  Simple interrupted    Number of sutures:  3  Approximation:     Approximation:  Close  Post-procedure details:     Dressing:  Antibiotic ointment and sterile dressing    Patient tolerance of procedure: Tolerated well, no immediate complications          ED COURSE/MDM  Closed head injury/Left non-depressed frontal bone skull fracture without depression or ICH secondary to mechanical fall with no historical evidence of presyncope or syncope with associated right supraorbital ridge laceration. Tetanus was updated. Wound precautions were given. Wound was primarily closed in the emergency department. Sutures out in 7 to 10 days. CAT scan of the head reveals no acute evidence of fracture or intracranial injury. Normal neurologic exam.  No history or physical evidence of concussion except for headache. Pt was d/w neurosurgery, Dr. Kennedi Arrington, who agreed with this plan of Abx prophylaxis with keflex and outpatient management. He reviewed the CT scan. Right elbow sprain: No evidence of acute bony fracture, dislocation or significant sprain. Ace wrap, ibuprofen/Tylenol, ice. Right knee abrasion: No evidence of internal derangement of the right knee or evidence of acute bony fracture. Wound care. Tetanus updated. Hypertension:  Patient was hypertensive during the ER visit today.   There are no signs of hypertensive emergency or evidence of end organ damage by history or physical exam.  These findings were discussed with the patient and advised to follow up with primary care physician to further assess and treat hypertension in the outpatient setting. The patient's blood pressure was found to be elevated according to CMS/Medicare and the Affordable Care Act/ObamaCare criteria. Elevated blood pressure could occur because of pain or anxiety or other reasons and does not mean that they need to have their blood pressure treated or medications otherwise adjusted. However, this could also be a sign that they will need to have their blood pressure treated or medications changed. The patient was instructed to take a list of recent blood pressure readings to their next visit with their personal physician. Patient was given scripts for the following medications. I counseled patient how to take these medications. New Prescriptions    CEPHALEXIN (KEFLEX) 500 MG CAPSULE    Take 1 capsule by mouth 3 times daily for 7 days         CLINICAL IMPRESSION  1. Injury of head, initial encounter    2. Facial laceration, initial encounter    3. Contusion of right elbow, initial encounter    4. Abrasion    5. Open fracture of frontal bone, initial encounter ([facility]        Blood pressure (!) 193/77, pulse 72, temperature 97.5 °F (36.4 °C), temperature source Tympanic, resp. rate 16, height 5' 6\" (1.676 m), weight 162 lb 11.2 oz (73.8 kg), SpO2 95 %, not currently breastfeeding.       Follow-up with:  MD Duran Meng [address]  [phone]    In 10 days  For suture removal          Michelle Carrizales MD  06/18/22 1998

## 2022-06-19 NOTE — ED NOTES
This RN [employer] for [place] consult. Spoke with Robert Perez.       Bucky Jordan, WILLIAM  06/18/22 [id number]

## 2022-06-19 NOTE — ED NOTES
Right side Head laceration wound Irrigated with NS 0.9% and CHG solution. Pt tolerated well.       Geoff Graf RN  06/18/22 [id number]

## 2022-06-20 ENCOUNTER — CARE COORDINATION (OUTPATIENT)
Dept: CARE COORDINATION | Age: 72
End: 2022-06-20

## 2022-06-20 DIAGNOSIS — M51.9 LUMBAR DISC DISEASE: ICD-10-CM

## 2022-06-20 DIAGNOSIS — G89.4 CHRONIC PAIN SYNDROME: ICD-10-CM

## 2022-06-20 DIAGNOSIS — M47.812 ARTHRITIS OF NECK: ICD-10-CM

## 2022-06-20 RX ORDER — TRAMADOL HYDROCHLORIDE 50 MG/1
50 TABLET ORAL 2 TIMES DAILY PRN
Qty: 60 TABLET | Refills: 1 | Status: SHIPPED | OUTPATIENT
Start: 2022-06-20 | End: 2022-08-22

## 2022-06-20 SDOH — SOCIAL STABILITY: SOCIAL NETWORK
DO YOU BELONG TO ANY CLUBS OR ORGANIZATIONS SUCH AS CHURCH GROUPS UNIONS, FRATERNAL OR ATHLETIC GROUPS, OR SCHOOL GROUPS?: NO

## 2022-06-20 SDOH — SOCIAL STABILITY: SOCIAL NETWORK: HOW OFTEN DO YOU GET TOGETHER WITH FRIENDS OR RELATIVES?: THREE TIMES A WEEK

## 2022-06-20 SDOH — ECONOMIC STABILITY: INCOME INSECURITY: HOW HARD IS IT FOR YOU TO PAY FOR THE VERY BASICS LIKE FOOD, HOUSING, MEDICAL CARE, AND HEATING?: NOT HARD AT ALL

## 2022-06-20 SDOH — SOCIAL STABILITY: SOCIAL NETWORK: HOW OFTEN DO YOU ATTEND CHURCH OR RELIGIOUS SERVICES?: NEVER

## 2022-06-20 SDOH — ECONOMIC STABILITY: INCOME INSECURITY: IN THE LAST 12 MONTHS, WAS THERE A TIME WHEN YOU WERE NOT ABLE TO PAY THE MORTGAGE OR RENT ON TIME?: NO

## 2022-06-20 SDOH — HEALTH STABILITY: PHYSICAL HEALTH: ON AVERAGE, HOW MANY MINUTES DO YOU ENGAGE IN EXERCISE AT THIS LEVEL?: 40 MIN

## 2022-06-20 SDOH — SOCIAL STABILITY: SOCIAL NETWORK: IN A TYPICAL WEEK, HOW MANY TIMES DO YOU TALK ON THE PHONE WITH FAMILY, FRIENDS, OR NEIGHBORS?: THREE TIMES A WEEK

## 2022-06-20 SDOH — ECONOMIC STABILITY: HOUSING INSECURITY
IN THE LAST 12 MONTHS, WAS THERE A TIME WHEN YOU DID NOT HAVE A STEADY PLACE TO SLEEP OR SLEPT IN A SHELTER (INCLUDING NOW)?: NO

## 2022-06-20 SDOH — HEALTH STABILITY: MENTAL HEALTH: HOW OFTEN DO YOU HAVE A DRINK CONTAINING ALCOHOL?: NEVER

## 2022-06-20 SDOH — SOCIAL STABILITY: SOCIAL NETWORK: HOW OFTEN DO YOU ATTENT MEETINGS OF THE CLUB OR ORGANIZATION YOU BELONG TO?: NEVER

## 2022-06-20 SDOH — SOCIAL STABILITY: SOCIAL NETWORK: ARE YOU MARRIED, WIDOWED, DIVORCED, SEPARATED, NEVER MARRIED, OR LIVING WITH A PARTNER?: WIDOWED

## 2022-06-20 SDOH — ECONOMIC STABILITY: FOOD INSECURITY: WITHIN THE PAST 12 MONTHS, YOU WORRIED THAT YOUR FOOD WOULD RUN OUT BEFORE YOU GOT MONEY TO BUY MORE.: SOMETIMES TRUE

## 2022-06-20 SDOH — HEALTH STABILITY: PHYSICAL HEALTH: ON AVERAGE, HOW MANY DAYS PER WEEK DO YOU ENGAGE IN MODERATE TO STRENUOUS EXERCISE (LIKE A BRISK WALK)?: 2 DAYS

## 2022-06-20 SDOH — ECONOMIC STABILITY: FOOD INSECURITY: WITHIN THE PAST 12 MONTHS, THE FOOD YOU BOUGHT JUST DIDN'T LAST AND YOU DIDN'T HAVE MONEY TO GET MORE.: SOMETIMES TRUE

## 2022-06-20 SDOH — HEALTH STABILITY: MENTAL HEALTH
STRESS IS WHEN SOMEONE FEELS TENSE, NERVOUS, ANXIOUS, OR CAN'T SLEEP AT NIGHT BECAUSE THEIR MIND IS TROUBLED. HOW STRESSED ARE YOU?: NOT AT ALL

## 2022-06-20 SDOH — ECONOMIC STABILITY: HOUSING INSECURITY: IN THE LAST 12 MONTHS, HOW MANY PLACES HAVE YOU LIVED?: 1

## 2022-06-20 NOTE — CARE COORDINATION
Ambulatory Care Coordination Note  6/20/2022  CM Risk Score: 5  Charlson 10 Year Mortality Risk Score: 98%     ACC: Ran Schroeder RN    Summary Note: Spoke to patient for follow up to recent ED visit. Introduced myself and provided my contact number. Patient states she was carrying a weed-bridgette in one hand and batteries in the other hand and did not see the 2 steps going outside and fell. Patient states she was not dizzy or having any light headedness. Patient states she does not worry about falling and this incident was clumsy accident. Patient states she understands discharge medications and to take them until entirely gone. Patient confirms that she will be contacting pcp to schedule a follow up for high blood pressure for getting in as soon as possible and also will schedule a follow up for suture removal in 10 days. Informed patient I can send some resources in [employer] about smoking, COPD, excessive heat this time of year, Blood pressure log to document daily. Patient is to complete a updated hippa once in office for appointment. Plan:  FU about htn pcp visit  FU on suture removal pcp visit  FU on my chart resources sent  FU on bp readings daily  FU on COA referral for additional help for food    Ambulatory Care Coordination Assessment    Care Coordination Protocol  Referral from Primary Care Provider: No  Week 1 - Initial Assessment     Do you have all of your prescriptions and are they filled?: Yes  Barriers to medication adherence: None  Are you able to afford your medications?: Yes  How often do you have trouble taking your medications the way you have been told to take them?: I always take them as prescribed. Do you have Home O2 Therapy?: No      Ability to seek help/take action for Emergent Urgent situations i.e. fire, crime, inclement weather or health crisis. : Independent  Ability to ambulate to restroom: Independent  Ability handle personal hygeine needs (bathing/dressing/grooming): Independent  Ability to manage Medications: Independent  Ability to prepare Food Preparation: Independent  Ability to maintain home (clean home, laundry): Needs Assistance  Ability to drive and/or has transportation: Needs Assistance  Ability to do shopping: Needs Assistance  Ability to manage finances: Needs Assistance  Is patient able to live independently?: Yes     Current Housing: Private Residence        Per the Fall Risk Screening, did the patient have 2 or more falls or 1 fall with injury in the past year?: No     Frequent urination at night?: No  Do you use rails/bars?: No  Do you have a non-slip tub mat?: Yes     Are you experiencing loss of meaning?: No  Are you experiencing loss of hope and peace?: No     Thinking about your patient's physical health needs, are there any symptoms or problems (risk indicators) you are unsure about that require further investigation?: No identified areas of uncertainly or problems already being investigated   Are the patients physical health problems impacting on their mental well-being?: No identified areas of concern   Are there any problems with your patients lifestyle behaviors (alcohol, drugs, diet, exercise) that are impacting on physical or mental well-being?: No identified areas of concern   Do you have any other concerns about your patients mental well-being?  How would you rate their severity and impact on the patient?: No identified areas of concern   How would you rate their home environment in terms of safety and stability (including domestic violence, insecure housing, neighbor harassment)?: Consistently safe, supportive, stable, no identified problems   How do daily activities impact on the patient's well-being? (include current or anticipated unemployment, work, caregiving, access to transportation or other): No identified problems or perceived positive benefits   How would you rate their social network (family, work, friends)?: Adequate participation with social networks   How would you rate their financial resources (including ability to afford all required medical care)?: Financially secure, some resource challenges   How wells does the patient now understand their health and well-being (symptoms, signs or risk factors) and what they need to do to manage their health?: Reasonable to good understanding and already engages in managing health or is willing to undertake better management   How well do you think your patient can engage in healthcare discussions? (Barriers include language, deafness, aphasia, alcohol or drug problems, learning difficulties, concentration): Clear and open communication, no identified barriers   Do other services need to be involved to help this patient?: Other care/services not in place and required   Are current services involved with this patient well-coordinated? (Include coordination with other services you are now recommendation): Required care/services missing and/or fragmented   Suggested Interventions and Community Resources  Fall Risk Prevention: Completed Meals on Wheels: Not Started   Smoking Cessation: Not Started   Zone Management Tools: In Process                  Prior to Admission medications    Medication Sig Start Date End Date Taking? Authorizing Provider   cephALEXin (KEFLEX) 500 MG capsule Take 1 capsule by mouth 3 times daily for 7 days 6/18/22 6/25/22 Yes Ernesto Daniels MD   traMADol (ULTRAM) 50 MG tablet Take 1 tablet by mouth 2 times daily as needed for Pain for up to 30 days.  5/23/22 6/22/22 Yes Hellen Leventhal, MD   umeclidinium-vilanterol [facility] ELLIPTA) 62.5-25 MCG/INH AEPB inhaler Inhale 1 puff into the lungs daily 5/23/22  Yes Hellen Leventhal, MD   vitamin D3 (CHOLECALCIFEROL) 25 MCG (1000 UT) TABS tablet Take 1 tablet by mouth daily 5/23/22  Yes Hellen Leventhal, MD   albuterol sulfate  (90 Base) MCG/ACT inhaler INHALE 2 PUFFS BY MOUTH EVERY 6 HOURS AS NEEDED FOR WHEEZING 5/23/22  Yes Tl Machado BULMARO Asher NP   lovastatin (MEVACOR) 40 MG tablet TAKE 1 TABLET BY MOUTH EVERY NIGHT 5/12/22  Yes Ashley Morocho MD   gabapentin (NEURONTIN) 600 MG tablet TAKE 1 TABLET BY MOUTH THREE TIMES DAILY 5/2/22 7/31/22 Yes BULMARO Thompson NP   levothyroxine (SYNTHROID) 25 MCG tablet TAKE 1 TABLET BY MOUTH DAILY 4/18/22  Yes BULMARO Thompson NP   citalopram (CELEXA) 40 MG tablet TAKE 1 TABLET BY MOUTH DAILY 12/27/21  Yes BULMARO Thompson NP   baclofen (LIORESAL) 10 MG tablet TAKE 1 TABLET BY MOUTH THREE TIMES DAILY AS NEEDED FOR MUSCLE SPASM 12/7/21  Yes BULMARO Thompson NP   promethazine (PHENERGAN) 12.5 MG tablet Take 1 tablet by mouth every 8 hours as needed for Nausea TAKE 1 TABLET BY MOUTH EVERY DAY AS NEEDED 10/28/21  Yes BULMARO Thompson NP   acetaminophen (TYLENOL) 500 MG tablet Take 500 mg by mouth every 6 hours as needed    Yes Historical Provider, MD   albuterol (PROVENTIL) (2.5 MG/3ML) 0.083% nebulizer solution Take 3 mLs by nebulization every 6 hours as needed for Wheezing 9/1/20  Yes Ashley Morocho MD   dicyclomine (BENTYL) 10 MG capsule Take 1 capsule by mouth 4 times daily as needed (abdominal cramping and diarrhea) 12/5/19  Yes Ashley Morocho MD       No future appointments. , ACC: Khalif Adrian RN CM Risk Score: 5  Charlson 10 Year Mortality Risk Score: 98%     Care Coordination Interventions    Referral from Primary Care Provider: No  Suggested Interventions and Community Resources  Fall Risk Prevention: Completed  Meals on Wheels: Not Started  Smoking Cessation: Not Started  Zone Management Tools:  In Process (Comment: copd)       ,   COPD Assessment    Does the patient understand envrionmental exposure?: Yes  Is the patient able to verbalize Rescue vs. Long Acting medications?: Yes  Does the patient have a nebulizer?: Yes  Does the patient use a space with inhaled medications?: No     No patient-reported symptoms         Symptoms:        , General Assessment    Do you have any symptoms that are causing concern?: Yes  Progression since Onset: Unchanged  Reported Symptoms: Other (Comment: high bp)     , Care Coordination Episodes    Type: Amb Care Management  Episode: complex care  Noted: 6/20/2022  Comments: enrolled cc, This patient was permanently screened out of Care Coordination on 6/20/2022 for the following reason:   and 5

## 2022-06-21 ENCOUNTER — OFFICE VISIT (OUTPATIENT)
Dept: FAMILY MEDICINE CLINIC | Age: 72
End: 2022-06-21
Payer: MEDICARE

## 2022-06-21 VITALS
HEART RATE: 79 BPM | TEMPERATURE: 98.3 F | BODY MASS INDEX: 25.39 KG/M2 | SYSTOLIC BLOOD PRESSURE: 130 MMHG | DIASTOLIC BLOOD PRESSURE: 78 MMHG | OXYGEN SATURATION: 96 % | HEIGHT: 66 IN | WEIGHT: 158 LBS

## 2022-06-21 DIAGNOSIS — S09.90XD CLOSED HEAD INJURY, SUBSEQUENT ENCOUNTER: ICD-10-CM

## 2022-06-21 DIAGNOSIS — Z09 HOSPITAL DISCHARGE FOLLOW-UP: Primary | ICD-10-CM

## 2022-06-21 DIAGNOSIS — S05.31XS: ICD-10-CM

## 2022-06-21 PROCEDURE — G8417 CALC BMI ABV UP PARAM F/U: HCPCS | Performed by: NURSE PRACTITIONER

## 2022-06-21 PROCEDURE — 3017F COLORECTAL CA SCREEN DOC REV: CPT | Performed by: NURSE PRACTITIONER

## 2022-06-21 PROCEDURE — 1090F PRES/ABSN URINE INCON ASSESS: CPT | Performed by: NURSE PRACTITIONER

## 2022-06-21 PROCEDURE — 99213 OFFICE O/P EST LOW 20 MIN: CPT | Performed by: NURSE PRACTITIONER

## 2022-06-21 PROCEDURE — 4004F PT TOBACCO SCREEN RCVD TLK: CPT | Performed by: NURSE PRACTITIONER

## 2022-06-21 PROCEDURE — 1123F ACP DISCUSS/DSCN MKR DOCD: CPT | Performed by: NURSE PRACTITIONER

## 2022-06-21 PROCEDURE — G8427 DOCREV CUR MEDS BY ELIG CLIN: HCPCS | Performed by: NURSE PRACTITIONER

## 2022-06-21 PROCEDURE — G8400 PT W/DXA NO RESULTS DOC: HCPCS | Performed by: NURSE PRACTITIONER

## 2022-06-21 RX ORDER — LEVOMEFOLATE CALCIUM 7.5 MG TABLET
7.5 TABLET DAILY
COMMUNITY

## 2022-06-21 ASSESSMENT — ENCOUNTER SYMPTOMS
COUGH: 0
NAUSEA: 0
VOMITING: 0
PHOTOPHOBIA: 0

## 2022-06-21 ASSESSMENT — PATIENT HEALTH QUESTIONNAIRE - PHQ9
SUM OF ALL RESPONSES TO PHQ QUESTIONS 1-9: 0
6. FEELING BAD ABOUT YOURSELF - OR THAT YOU ARE A FAILURE OR HAVE LET YOURSELF OR YOUR FAMILY DOWN: 0
SUM OF ALL RESPONSES TO PHQ QUESTIONS 1-9: 0
10. IF YOU CHECKED OFF ANY PROBLEMS, HOW DIFFICULT HAVE THESE PROBLEMS MADE IT FOR YOU TO DO YOUR WORK, TAKE CARE OF THINGS AT HOME, OR GET ALONG WITH OTHER PEOPLE: 0
2. FEELING DOWN, DEPRESSED OR HOPELESS: 0
SUM OF ALL RESPONSES TO PHQ QUESTIONS 1-9: 0
1. LITTLE INTEREST OR PLEASURE IN DOING THINGS: 0
SUM OF ALL RESPONSES TO PHQ9 QUESTIONS 1 & 2: 0
9. THOUGHTS THAT YOU WOULD BE BETTER OFF DEAD, OR OF HURTING YOURSELF: 0
7. TROUBLE CONCENTRATING ON THINGS, SUCH AS READING THE NEWSPAPER OR WATCHING TELEVISION: 0
3. TROUBLE FALLING OR STAYING ASLEEP: 0
SUM OF ALL RESPONSES TO PHQ QUESTIONS 1-9: 0
5. POOR APPETITE OR OVEREATING: 0
4. FEELING TIRED OR HAVING LITTLE ENERGY: 0
8. MOVING OR SPEAKING SO SLOWLY THAT OTHER PEOPLE COULD HAVE NOTICED. OR THE OPPOSITE, BEING SO FIGETY OR RESTLESS THAT YOU HAVE BEEN MOVING AROUND A LOT MORE THAN USUAL: 0

## 2022-06-21 NOTE — PROGRESS NOTES
Silver Quinones (:  1950) is a 71 y.o. female,Established patient, here for evaluation of the following chief complaint(s):  Follow-up (fell has a skull fracture and stitches above right eye [facility]) and Hypertension (top number running high)         ASSESSMENT/PLAN:  1. Hospital discharge follow-up  Reviewed ED care plan and medications    2. Eye laceration, right, sequela  Stable, laceration and sutures intact, surrounding tissue is bruised, no drainage    3. Closed head injury, subsequent encounter  Stable. Pt denies n/v, vision changes or weakness. Return in about 1 week (around 2022), or suture removal.         Subjective   SUBJECTIVE/OBJECTIVE:  HPI  Presents today for ED follow up from fall 2022, states she was going down stairs and fell face fisrt on concrete. Denies loc. CT in ED showed right orbital fracture. Had 4-5 sutures placed to right brow laceration. States he bp was elevated in the ED. 5/10 general HA, well controlled with Ultram.     Pt states she has never had high bp. Currently denies nose bleeds, vision or speech changes, leg swelling, CP, sob. Current Outpatient Medications   Medication Sig Dispense Refill    methylfolate (DEPLIN) 7.5 MG TABS tablet Take 7.5 tablets by mouth daily      traMADol (ULTRAM) 50 MG tablet Take 1 tablet by mouth 2 times daily as needed for Pain for up to 60 days.  60 tablet 1    cephALEXin (KEFLEX) 500 MG capsule Take 1 capsule by mouth 3 times daily for 7 days 21 capsule 0    umeclidinium-vilanterol (ANORO ELLIPTA) 62.5-25 MCG/INH AEPB inhaler Inhale 1 puff into the lungs daily 1 each 5    vitamin D3 (CHOLECALCIFEROL) 25 MCG (1000 UT) TABS tablet Take 1 tablet by mouth daily 30 tablet 5    albuterol sulfate  (90 Base) MCG/ACT inhaler INHALE 2 PUFFS BY MOUTH EVERY 6 HOURS AS NEEDED FOR WHEEZING 20.1 g 0    lovastatin (MEVACOR) 40 MG tablet TAKE 1 TABLET BY MOUTH EVERY NIGHT 90 tablet 1    gabapentin (NEURONTIN) 600 MG tablet TAKE 1 TABLET BY MOUTH THREE TIMES DAILY 270 tablet 0    levothyroxine (SYNTHROID) 25 MCG tablet TAKE 1 TABLET BY MOUTH DAILY 90 tablet 1    citalopram (CELEXA) 40 MG tablet TAKE 1 TABLET BY MOUTH DAILY 90 tablet 1    baclofen (LIORESAL) 10 MG tablet TAKE 1 TABLET BY MOUTH THREE TIMES DAILY AS NEEDED FOR MUSCLE SPASM 90 tablet 2    promethazine (PHENERGAN) 12.5 MG tablet Take 1 tablet by mouth every 8 hours as needed for Nausea TAKE 1 TABLET BY MOUTH EVERY DAY AS NEEDED 90 tablet 0    acetaminophen (TYLENOL) 500 MG tablet Take 500 mg by mouth every 6 hours as needed       albuterol (PROVENTIL) (2.5 MG/3ML) 0.083% nebulizer solution Take 3 mLs by nebulization every 6 hours as needed for Wheezing 120 each 0    dicyclomine (BENTYL) 10 MG capsule Take 1 capsule by mouth 4 times daily as needed (abdominal cramping and diarrhea) 120 capsule 1     No current facility-administered medications for this visit. Past Medical History:   Diagnosis Date    Anxiety     Cervical herniated disc     Circulation problem     [facility]     CRF (chronic renal failure), stage 3 (moderate) ([facility])     Depression     Depression     Diverticulosis of colon     Fibromyalgia     Hx of blood clots     DVT    Hyperlipidemia     Joint ache     Kidney failure     states begining stage 4 since summer '17    Lumbar disc disease     Lumbar disc disease     Migraine     Muscle spasm of back     Pleural effusion     Prolonged emergence from general anesthesia     SV[facility]ricular tachycardia) ([facility])     Thyroid disease     HYPOTHYROIDISM    Varicose vein         Review of Systems   Constitutional: Negative for activity change, fatigue, fever and unexpected weight change. Eyes: Negative for photophobia and visual disturbance. Respiratory: Negative for cough. Cardiovascular: Negative for chest pain. Gastrointestinal: Negative for nausea and vomiting. Musculoskeletal: Positive for arthralgias (chronic back, right knee). Skin:        Right brown laceration, right knee abrasion   Neurological: Positive for headaches (3/10). Negative for dizziness, facial asymmetry, speech difficulty and weakness. Psychiatric/Behavioral: Negative for sleep disturbance. Objective   Physical Exam  Constitutional:       General: She is not in acute distress. Appearance: Normal appearance. She is not ill-appearing. HENT:      Head: Normocephalic and atraumatic. Right Ear: Tympanic membrane, ear canal and external ear normal.      Left Ear: Tympanic membrane, ear canal and external ear normal.      Mouth/Throat:      Mouth: Mucous membranes are moist.      Pharynx: Oropharynx is clear. Eyes:      Extraocular Movements: Extraocular movements intact. Conjunctiva/sclera: Conjunctivae normal.      Pupils: Pupils are equal, round, and reactive to light. Cardiovascular:      Rate and Rhythm: Normal rate and regular rhythm. Pulses: Normal pulses. Heart sounds: Normal heart sounds. Pulmonary:      Effort: Pulmonary effort is normal. No respiratory distress. Breath sounds: Normal breath sounds. Musculoskeletal:      Right lower leg: No edema. Left lower leg: No edema. Skin:     General: Skin is warm and dry. Findings: Bruising (right brow, periorbital) and lesion (right brow laceration with sutures, abrasion to right knee) present. Neurological:      Mental Status: She is alert.               --BULMARO Asencio - OSMANI

## 2022-06-28 ENCOUNTER — OFFICE VISIT (OUTPATIENT)
Dept: FAMILY MEDICINE CLINIC | Age: 72
End: 2022-06-28
Payer: MEDICARE

## 2022-06-28 VITALS
WEIGHT: 161 LBS | SYSTOLIC BLOOD PRESSURE: 124 MMHG | TEMPERATURE: 97.7 F | HEART RATE: 75 BPM | BODY MASS INDEX: 25.88 KG/M2 | DIASTOLIC BLOOD PRESSURE: 78 MMHG | OXYGEN SATURATION: 96 % | HEIGHT: 66 IN

## 2022-06-28 DIAGNOSIS — Z48.02 VISIT FOR SUTURE REMOVAL: Primary | ICD-10-CM

## 2022-06-28 PROCEDURE — G8400 PT W/DXA NO RESULTS DOC: HCPCS | Performed by: NURSE PRACTITIONER

## 2022-06-28 PROCEDURE — G8417 CALC BMI ABV UP PARAM F/U: HCPCS | Performed by: NURSE PRACTITIONER

## 2022-06-28 PROCEDURE — 3017F COLORECTAL CA SCREEN DOC REV: CPT | Performed by: NURSE PRACTITIONER

## 2022-06-28 PROCEDURE — 1123F ACP DISCUSS/DSCN MKR DOCD: CPT | Performed by: NURSE PRACTITIONER

## 2022-06-28 PROCEDURE — 4004F PT TOBACCO SCREEN RCVD TLK: CPT | Performed by: NURSE PRACTITIONER

## 2022-06-28 PROCEDURE — 99212 OFFICE O/P EST SF 10 MIN: CPT | Performed by: NURSE PRACTITIONER

## 2022-06-28 PROCEDURE — G8427 DOCREV CUR MEDS BY ELIG CLIN: HCPCS | Performed by: NURSE PRACTITIONER

## 2022-06-28 PROCEDURE — 1090F PRES/ABSN URINE INCON ASSESS: CPT | Performed by: NURSE PRACTITIONER

## 2022-06-28 NOTE — PROGRESS NOTES
Savannah Mccoy (:  1950) is a 71 y.o. female,Established patient, here for evaluation of the following chief complaint(s):  Suture / Staple Removal (above right eye 4 or 5 stitches)         ASSESSMENT/PLAN:  1. Visit for suture removal  3 sutures removed from right eyebrow laceration. Well approximated, no drainage or redness noted. Follow pt education and instructions. Return if symptoms worsen or fail to improve. Subjective   SUBJECTIVE/OBJECTIVE:  HPI  Presents to office for right eyebrow suture removal. Pt denies redness, drainage or fevers or other concerns. Current Outpatient Medications   Medication Sig Dispense Refill    methylfolate (DEPLIN) 7.5 MG TABS tablet Take 7.5 tablets by mouth daily      traMADol (ULTRAM) 50 MG tablet Take 1 tablet by mouth 2 times daily as needed for Pain for up to 60 days.  60 tablet 1    umeclidinium-vilanterol (ANORO ELLIPTA) 62.5-25 MCG/INH AEPB inhaler Inhale 1 puff into the lungs daily 1 each 5    vitamin D3 (CHOLECALCIFEROL) 25 MCG (1000 UT) TABS tablet Take 1 tablet by mouth daily 30 tablet 5    albuterol sulfate  (90 Base) MCG/ACT inhaler INHALE 2 PUFFS BY MOUTH EVERY 6 HOURS AS NEEDED FOR WHEEZING 20.1 g 0    lovastatin (MEVACOR) 40 MG tablet TAKE 1 TABLET BY MOUTH EVERY NIGHT 90 tablet 1    gabapentin (NEURONTIN) 600 MG tablet TAKE 1 TABLET BY MOUTH THREE TIMES DAILY 270 tablet 0    levothyroxine (SYNTHROID) 25 MCG tablet TAKE 1 TABLET BY MOUTH DAILY 90 tablet 1    citalopram (CELEXA) 40 MG tablet TAKE 1 TABLET BY MOUTH DAILY 90 tablet 1    baclofen (LIORESAL) 10 MG tablet TAKE 1 TABLET BY MOUTH THREE TIMES DAILY AS NEEDED FOR MUSCLE SPASM 90 tablet 2    promethazine (PHENERGAN) 12.5 MG tablet Take 1 tablet by mouth every 8 hours as needed for Nausea TAKE 1 TABLET BY MOUTH EVERY DAY AS NEEDED 90 tablet 0    acetaminophen (TYLENOL) 500 MG tablet Take 500 mg by mouth every 6 hours as needed       albuterol (PROVENTIL) (2.5 MG/3ML) 0.083% nebulizer solution Take 3 mLs by nebulization every 6 hours as needed for Wheezing 120 each 0    dicyclomine (BENTYL) 10 MG capsule Take 1 capsule by mouth 4 times daily as needed (abdominal cramping and diarrhea) 120 capsule 1     No current facility-administered medications for this visit. Past Medical History:   Diagnosis Date    Anxiety     Cervical herniated disc     Circulation problem     [facility]     CRF (chronic renal failure), stage 3 (moderate) ([facility])     Depression     Depression     Diverticulosis of colon     Fibromyalgia     Hx of blood clots     DVT    Hyperlipidemia     Joint ache     Kidney failure     states begining stage 4 since summer '17    Lumbar disc disease     Lumbar disc disease     Migraine     Muscle spasm of back     Pleural effusion     Prolonged emergence from general anesthesia     SVT (supraventricular tachycardia) ([facility])     Thyroid disease     HYPOTHYROIDISM    Varicose vein         Review of Systems   Constitutional: Negative for fever. Eyes: Positive for visual disturbance (wears glasses). Skin: Positive for wound (right brow sutures). Negative for rash. Neurological: Negative for dizziness and headaches. Objective   Physical Exam  Constitutional:       Appearance: Normal appearance. Cardiovascular:      Rate and Rhythm: Normal rate. Pulmonary:      Effort: Pulmonary effort is normal.   Skin:     General: Skin is warm and dry. Capillary Refill: Capillary refill takes less than 2 seconds. Findings: Lesion (well approximated right brow laceration) present. Neurological:      Mental Status: She is alert and oriented to person, place, and time.               --BULMARO Monroy - NP

## 2022-06-28 NOTE — PATIENT INSTRUCTIONS
Patient Education        Learning About Stitches and Staples Removal  When are stitches and staples removed? Your doctor will tell you when to have your stitches or staples removed, usually in 7 to 14 days. How long you'll be told to wait will depend on things like where the wound is located, how big and how deep the wound is, and [facility]. Do not remove the stitches on your own. Stitches on the face are usually removed within a week. But stitches and staples on other areas of the body, such as on the back or belly or over a joint, may need to stay in place longer, often a week or two. Be sure to followyour doctor's instructions. How are stitches and staples removed? It usually doesn't hurt when the doctor removes the stitches or wendie. Alfredo Dose feel a tug as each stitch or staple is removed.  You will either be seated or lying down.  To remove stitches, the doctor will use scissors to cut each of the knots and then pull the threads out.  To remove staples, the doctor will use a tool to take out the staples one at a time.  The area may still feel tender after the stitches or staples are gone. But it should feel better within a few minutes or up to a few hours. What can you expect after stitches and staples are removed? Depending on the type and location of the cut, you will have a scar. Scarsusually fade over time. Keep the area clean, but you won't need a bandage. When should you call for help? Call your doctor now or seek immediate medical care if :   You have new pain, or your pain gets worse.  You have trouble moving the area near the scar.  You have symptoms of infection, such as:  ? Increased pain, swelling, warmth, or redness around the scar. ? Red streaks leading from the scar. ? Pus draining from the scar. ? A fever. Watch closely for changes in your health, and be sure to contact your doctor if:    The scar opens.    You do not get better as expected. Follow-up care is a key part of your treatment and safety. Be sure to make and go to all appointments, and call your doctor if you do not get better as expected. It's also a good idea to keep a list of the medicinesyou take. Where can you learn more? Go to [email or web address] and sign in to your [employer] account. Enter T775 in the [employer] box to learn more about \"Learning About Stitches and Staples Removal.\"     If you do not have an account, please click on the \"Sign Up Now\" link. Current as of: November 15, 2021               Content Version: 13.3  © 2006-2022 Healthwise, Inc[employer]. Care instructions adapted under license by [facility] ([facility]). If you have questions about a medical condition or this instruction, always ask your healthcare professional. [address] any warranty or liability for your use of this information.

## 2022-06-30 ENCOUNTER — CARE COORDINATION (OUTPATIENT)
Dept: CARE COORDINATION | Age: 72
End: 2022-06-30

## 2022-06-30 NOTE — CARE COORDINATION
Ambulatory Care Coordination Note  6/30/2022  CM Risk Score: 5  Charlson 10 Year Mortality Risk Score: 98%     ACC: Adwoa Bernardo, RN    Summary Note: Called and spoke to patient for follow up. Patient states she has been having her daughter measure her bp daily. BP has decreased and is within normal range. Last reading 120/76. Patient states she has not received a call from [facility]. Will send referral. Patient denies any sob, chest pain, cough. Patient has not reviewed the my chart material previously sent, but states she will. No questions, concerns at this follow up call. Informed patient if something comes up dont hestitate to reach out to me. Patient thanked RN for call. Plan:    FU on coa referral  FU on my chart material sent to patient  FU on disease specific resources, educational, or needs    Lab Results     None          Care Coordination Interventions    Referral from Primary Care Provider: No  Suggested Interventions and Community Resources  Fall Risk Prevention: Completed  Meals on Wheels: Not Started  Smoking Cessation: Not Started  Other Services: In Process (Comment: coa referral)  Zone Management Tools: In Process (Comment: copd)         Goals Addressed                    This Visit's Progress      Medication Management   On track      I will take my medication as directed. I will notify my provider of any problems with medications, like adverse effects or side effects. I will notify my provider/Care Coordinator if I am unable to afford my medications. I will notify my provider for advice before I stop taking any of my medication. Barriers: NA  Plan for overcoming my barriers: NA  Confidence: 7/10  Anticipated Goal Completion Date: 7/2022        Patient Stated (pt-stated)   On track      I will schedule an appointment with my pcp for htn management. I will monitor my bp daily and record it in log provided by my chart. Barriers: Remembering to chart write/ log it daily.   Plan for overcoming my barriers: Set BP cuff next to morning medications to remind to take it daily. Confidence: 5/10  Anticipated Goal Completion Date: 7/2022            Prior to Admission medications    Medication Sig Start Date End Date Taking? Authorizing Provider   methylfolate (DEPLIN) 7.5 MG TABS tablet Take 7.5 tablets by mouth daily    Historical Provider, MD   traMADol (ULTRAM) 50 MG tablet Take 1 tablet by mouth 2 times daily as needed for Pain for up to 60 days.  6/20/22 8/19/22  Kayleigh James MD   umeclidinium-vilanterol Michelle Skipper ELLIPTA) 62.5-25 MCG/INH AEPB inhaler Inhale 1 puff into the lungs daily 5/23/22   Kayleigh James MD   vitamin D3 (CHOLECALCIFEROL) 25 MCG (1000 UT) TABS tablet Take 1 tablet by mouth daily 5/23/22   Kayleigh James MD   albuterol sulfate  (90 Base) MCG/ACT inhaler INHALE 2 PUFFS BY MOUTH EVERY 6 HOURS AS NEEDED FOR WHEEZING 5/23/22   BULMARO Sampson NP   lovastatin (MEVACOR) 40 MG tablet TAKE 1 TABLET BY MOUTH EVERY NIGHT 5/12/22   Kayleigh James MD   gabapentin (NEURONTIN) 600 MG tablet TAKE 1 TABLET BY MOUTH THREE TIMES DAILY 5/2/22 7/31/22  BULMARO Sampson NP   levothyroxine (SYNTHROID) 25 MCG tablet TAKE 1 TABLET BY MOUTH DAILY 4/18/22   BULMARO Sampson - OSMANI   citalopram (CELEXA) 40 MG tablet TAKE 1 TABLET BY MOUTH DAILY 12/27/21   BULMARO Sampson - OSMANI   baclofen (LIORESAL) 10 MG tablet TAKE 1 TABLET BY MOUTH THREE TIMES DAILY AS NEEDED FOR MUSCLE SPASM 12/7/21   BULMARO Sampson - NP   promethazine (PHENERGAN) 12.5 MG tablet Take 1 tablet by mouth every 8 hours as needed for Nausea TAKE 1 TABLET BY MOUTH EVERY DAY AS NEEDED 10/28/21   BULMARO Sampson NP   acetaminophen (TYLENOL) 500 MG tablet Take 500 mg by mouth every 6 hours as needed     Historical Provider, MD   albuterol (PROVENTIL) (2.5 MG/3ML) 0.083% nebulizer solution Take 3 mLs by nebulization every 6 hours as needed for Wheezing 9/1/20   Kayleigh James MD dicyclomine (BENTYL) 10 MG capsule Take 1 capsule by mouth 4 times daily as needed (abdominal cramping and diarrhea) 12/5/19   aKyleigh James MD       Future Appointments   Date Time Provider Pablo Kelly   7/18/2022  2:00 PM [facility]      and   COPD Assessment    Does the patient understand envrionmental exposure?: Yes  Is the patient able to verbalize Rescue vs. Long Acting medications?: Yes  Does the patient have a nebulizer?: Yes  Does the patient use a space with inhaled medications?: No     No patient-reported symptoms         Symptoms:

## 2022-07-05 DIAGNOSIS — M51.9 LUMBAR DISC DISEASE: ICD-10-CM

## 2022-07-06 RX ORDER — BACLOFEN 10 MG/1
TABLET ORAL
Qty: 90 TABLET | Refills: 2 | Status: SHIPPED | OUTPATIENT
Start: 2022-07-06

## 2022-07-06 NOTE — PROGRESS NOTES
[facility] ENDOSCOPY AND OUTPATIENT  PRE-PROCEDURE INSTRUCTIONS    Procedure date___7/8/22______Arrival time___1015_________        Procedure time___1115_________       Screening questions for Pain procedures:  Do you have a current infection? _____n____  Are you currently taking an antibiotic?_____n_  Are you taking a blood thinner?_____n___    It is not necessary to stop eating or drinking prior to this procedure. We would like you to take your medications for blood pressure as usual.  You may be asked to stop blood thinners such as Coumadin, Plavix, Fragmin, Lovenox, etc., or any anti-inflammatories such as:  Aspirin, Ibuprofen, Advil, Naproxen prior to your procedure. We also ask that you stop any OTC medications that cause additional bleeding    You must make arrangements for a responsible adult to arrive with you and stay in our waiting area during your procedure. They will also need to take you home after your procedure. For your safety you will not be allowed to leave alone or drive yourself home. Also for your safety, it is strongly suggested that someone stay with you the first 24 hours after your procedure. For your comfort, please wear simple loose fitting clothing to the center. Please do not bring valuables. If you have a living will and a durable power of  for healthcare, please bring in a copy.     You will need to bring a photo ID and insurance card    Our goal is to provide you with excellent care so if you have any questions, please contact us at the [facility] at [phone]

## 2022-07-08 ENCOUNTER — APPOINTMENT (OUTPATIENT)
Dept: INTERVENTIONAL RADIOLOGY/VASCULAR | Age: 72
End: 2022-07-08
Attending: ANESTHESIOLOGY
Payer: MEDICARE

## 2022-07-08 ENCOUNTER — HOSPITAL ENCOUNTER (OUTPATIENT)
Age: 72
Setting detail: OUTPATIENT SURGERY
Discharge: HOME | End: 2022-07-08
Attending: ANESTHESIOLOGY | Admitting: ANESTHESIOLOGY
Payer: MEDICARE

## 2022-07-08 VITALS
HEART RATE: 70 BPM | BODY MASS INDEX: 25.55 KG/M2 | HEIGHT: 66 IN | TEMPERATURE: 97.2 F | DIASTOLIC BLOOD PRESSURE: 86 MMHG | SYSTOLIC BLOOD PRESSURE: 133 MMHG | OXYGEN SATURATION: 96 % | WEIGHT: 159 LBS | RESPIRATION RATE: 17 BRPM

## 2022-07-08 PROCEDURE — 3610000054 HC PAIN LEVEL 3 BASE (NON-OR): Performed by: ANESTHESIOLOGY

## 2022-07-08 PROCEDURE — 6360000004 HC RX CONTRAST MEDICATION: Performed by: ANESTHESIOLOGY

## 2022-07-08 PROCEDURE — 2709999900 HC NON-CHARGEABLE SUPPLY: Performed by: ANESTHESIOLOGY

## 2022-07-08 PROCEDURE — 6360000002 HC RX W HCPCS: Performed by: ANESTHESIOLOGY

## 2022-07-08 RX ORDER — METHYLPREDNISOLONE ACETATE 80 MG/ML
INJECTION, SUSPENSION INTRA-ARTICULAR; INTRALESIONAL; INTRAMUSCULAR; SOFT TISSUE
Status: COMPLETED | OUTPATIENT
Start: 2022-07-08 | End: 2022-07-08

## 2022-07-08 ASSESSMENT — PAIN DESCRIPTION - ORIENTATION
ORIENTATION: LOWER;MID
ORIENTATION: LOWER;MID

## 2022-07-08 ASSESSMENT — PAIN DESCRIPTION - DESCRIPTORS
DESCRIPTORS: ACHING

## 2022-07-08 ASSESSMENT — PAIN DESCRIPTION - PAIN TYPE
TYPE: CHRONIC PAIN
TYPE: CHRONIC PAIN

## 2022-07-08 ASSESSMENT — PAIN DESCRIPTION - ONSET
ONSET: ON-GOING
ONSET: ON-GOING

## 2022-07-08 ASSESSMENT — PAIN SCALES - GENERAL
PAINLEVEL_OUTOF10: 7
PAINLEVEL_OUTOF10: 7

## 2022-07-08 ASSESSMENT — PAIN DESCRIPTION - FREQUENCY
FREQUENCY: CONTINUOUS
FREQUENCY: CONTINUOUS

## 2022-07-08 ASSESSMENT — PAIN DESCRIPTION - LOCATION
LOCATION: BACK
LOCATION: BACK

## 2022-07-08 ASSESSMENT — PAIN - FUNCTIONAL ASSESSMENT
PAIN_FUNCTIONAL_ASSESSMENT: ACTIVITIES ARE NOT PREVENTED
PAIN_FUNCTIONAL_ASSESSMENT: ACTIVITIES ARE NOT PREVENTED
PAIN_FUNCTIONAL_ASSESSMENT: 0-10
PAIN_FUNCTIONAL_ASSESSMENT: ACTIVITIES ARE NOT PREVENTED

## 2022-07-08 NOTE — OP NOTE
Patient:  Arlette Cullen  YOB: 1950  Medical Record #:  [id number]   Place: [address] [facility]  Date:  7/8/2022   Physician:  Rell Danielle MD      PRE-PROCEDURE DIAGNOSIS: M54.16    POST-PROCEDURE DIAGNOSIS: M54.16    PROCEDURE:  Midline interlaminar left  L4-5 epidural steroid injection with fluoroscopy and epidurography. BRIEF HISTORY:  The patient presents today to [facility] for a scheduled lumbar epidural steroid injection procedure. The patient was re-evaluated today and is clinically unchanged as compared to my previous evaluation. The patient is clinically stable to proceed with the procedure. PROCEDURE NOTE:  The procedure was again explained to the patient and the previously distributed pre-procedure literature was reviewed. The options, rationale, and benefits of the procedure including pain relief, functional improvement, and increased mobility, as well as the risks of the procedure including but not limited to infection, bleeding, paresthesia, pain, failure to relieve pain, increased pain, headache, allergic reaction, neurologic impairment, local anesthetic, toxicity, and side effects and the potential side effects of corticosteroids were discussed with the patient and informed written consent was obtained from the patient. The patient was positioned in the prone position on the fluoroscopy table. The skin overlying the lumbosacral vertebrae was prepped using Chloraprep and draped in the usual sterile fashion. The L4-5 lumbar intervertebral level was identified using intermittent AP fluoroscopy. The previously identified projection of overlying skin was anesthetized using 2 cc of buffered 1% lidocaine with a 27 gauge needle. A 3.5\" 22g Touhy needle was advanced through a small skin nick in the AP view towards the interlaminar and epidural space. The epidural space was easily identified using loss of resistance to saline.   No difficulty, paresthesia or occurrence of pain was encountered. Careful aspiration was negative for CSF and blood. A total of 1 cc Isovue 300 was injected yielding an epidurogram.    FLUOROSCOPY:  A fluoroscopy unit was utilized to obtain fluoroscopic images for intra-procedural use and assistance. Fluoroscopy was utilized to identify anatomic and radiographic landmarks for the accompanying procedure guidance and not for diagnostic purposes. After negative aspiration 4 cc of therapeutic injectate containing 1 cc of Depo-Medrol 80 mg/cc and 3 ml of lidocaine 1% was injected slowly in aliquots while clinically observing and monitoring the patient with negative aspiration demonstrated between aliquots of injections. At this time no paresthesia or occurrence of pain was present. The needle was then removed, the area was cleansed and a Band-Aid was placed over the injection site. There were no complications. The patient tolerated the procedure well. The procedure was performed using local anesthesia. The patient was transferred by wheelchair with accompaniment to the  Recovery Area and was monitored per protocol. The vital signs remained stable. The patient was discharged in stable condition accompanied by an escort with written instructions after fulfilling the standard discharge criteria. Written follow up instructions were given to the patient.     Estimated Blood Loss: 0ml    Plan:  Follow up in 6-8 weeks      Office: [phone]

## 2022-07-08 NOTE — H&P
Patient:  Dano Gomez  YOB: 1950  Medical Record #:  [id number]   Place: [address] [facility]  Date:  7/8/2022   Physician:  Gloria San MD    History Obtained From: electronic medical record    HISTORY OF PRESENT ILLNESS    Past Medical History:        Diagnosis Date    Anxiety     Cervical herniated disc     Circulation problem     [facility]     CRF (chronic renal failure), stage 3 (moderate) [facility]     Depression     Depression     Diverticulosis of colon     Fibromyalgia     Hx of blood clots     DVT    Hyperlipidemia     Joint ache     Kidney failure     states begining stage 4 since summer '17    Lumbar disc disease     Lumbar disc disease     Migraine     Muscle spasm of back     Pleural effusion     Prolonged emergence from general anesthesia     SVT [facility]raventricular tachycardia) ([facility]     Thyroid disease     HYPOTHYROIDISM    Varicose vein      Past Surgical History:        Procedure Laterality Date    COLONOSCOPY      HERNIA REPAIR      right inguinal and UMBILICAL-2 SURGERIES    HYSTERECTOMY (CERVIX STATUS UNKNOWN)      HYSTERECTOMY, VAGINAL      PAIN MANAGEMENT PROCEDURE Left 6/12/2020    LUMBAR EPIDURAL STEROID INJECTION LEFT L4-L5 performed by Speedy Whitten MD at [address] N/A 11/11/2020    CHOLECYSTECTOMY AND SIGMOID COLECTOMY performed by Eren Shipman MD at [address] [facility]       Medications Prior to Admission:   No current facility-administered medications on file prior to encounter. Current Outpatient Medications on File Prior to Encounter   Medication Sig Dispense Refill    methylfolate (DEPLIN) 7.5 MG TABS tablet Take 7.5 tablets by mouth daily      traMADol (ULTRAM) 50 MG tablet Take 1 tablet by mouth 2 times daily as needed for Pain for up to 60 days.  60 tablet 1    umeclidinium-vilanterol (ANORO ELLIPTA) 62.5-25 MCG/INH AEPB inhaler Inhale 1 puff into the lungs daily 1 each 5    vitamin D3 (CHOLECALCIFEROL) 25 MCG (1000 UT) TABS tablet Take 1 tablet by mouth daily 30 tablet 5    albuterol sulfate  (90 Base) MCG/ACT inhaler INHALE 2 PUFFS BY MOUTH EVERY 6 HOURS AS NEEDED FOR WHEEZING 20.1 g 0    lovastatin (MEVACOR) 40 MG tablet TAKE 1 TABLET BY MOUTH EVERY NIGHT 90 tablet 1    gabapentin (NEURONTIN) 600 MG tablet TAKE 1 TABLET BY MOUTH THREE TIMES DAILY 270 tablet 0    levothyroxine (SYNTHROID) 25 MCG tablet TAKE 1 TABLET BY MOUTH DAILY 90 tablet 1    citalopram (CELEXA) 40 MG tablet TAKE 1 TABLET BY MOUTH DAILY 90 tablet 1    promethazine (PHENERGAN) 12.5 MG tablet Take 1 tablet by mouth every 8 hours as needed for Nausea TAKE 1 TABLET BY MOUTH EVERY DAY AS NEEDED 90 tablet 0    acetaminophen (TYLENOL) 500 MG tablet Take 500 mg by mouth every 6 hours as needed       albuterol (PROVENTIL) (2.5 MG/3ML) 0.083% nebulizer solution Take 3 mLs by nebulization every 6 hours as needed for Wheezing 120 each 0    dicyclomine (BENTYL) 10 MG capsule Take 1 capsule by mouth 4 times daily as needed (abdominal cramping and diarrhea) 120 capsule 1     Allergies:  Ciprofloxacin, Nsaids, Abilify [aripiprazole], Benadryl [diphenhydramine], Darvon [propoxyphene], Dilaudid [hydromorphone hcl], Hydrocodone-acetaminophen, Morphine, Percocet [oxycodone-acetaminophen], and Trintellix [vortioxetine]  Social History     Socioeconomic History    Marital status:       Spouse name: Not on file    Number of children: 2    Years of education: Not on file    Highest education level: Not on file   Occupational History    Occupation: NA   Tobacco Use    Smoking status: Current Every Day Smoker     Packs/day: 0.33     Years: 45.00     Pack years: 14.85     Types: Cigarettes     Start date: 8/16/2014    Smokeless tobacco: Never Used    Tobacco comment: would like to quit   Vaping Use    Vaping Use: Never used   Substance and Sexual Activity    Alcohol use: No     Alcohol/week: 0.0 standard drinks    Drug use: Never    Sexual activity: Never   Other Topics Concern    Not on file   Social History Narrative    Not on file     Social Determinants of Health     Financial Resource Strain: Low Risk     Difficulty of Paying Living Expenses: Not hard at all   Food Insecurity: Food Insecurity Present    Worried About [address] [facility] in the Last Year: Sometimes true    Dora of Food in the Last Year: Sometimes true   Transportation Needs: No Transportation Needs    Lack of Transportation (Medical): No    Lack of Transportation (Non-Medical): No   Physical Activity: Insufficiently Active    Days of Exercise per Week: 2 days    Minutes of Exercise per Session: 40 min   Stress: No Stress Concern Present    Feeling of Stress : Not at all   Social Connections: Socially Isolated    Frequency of Communication with Friends and Family: Three times a week    Frequency of Social Gatherings with Friends and Family: Three times a week    Attends Mandaen Services: Never    Active Member of Clubs or Organizations: No    Attends Club or Organization Meetings: Never    Marital Status:     Intimate Partner Violence:     Fear of Current or Ex-Partner: Not on file    Emotionally Abused: Not on file    Physically Abused: Not on file    Sexually Abused: Not on file   Housing Stability: [address] Unable to Pay for Housing in the Last Year: No    Number of [place] in the Last Year: 1    Unstable Housing in the Last Year: No     Family History   Problem Relation Age of Onset    Hypertension Mother     Heart Failure Mother     Cancer Mother         ovarian     Stroke Father     Asthma Father     Heart Failure Brother     Cancer Brother         lung    Diabetes Brother     Heart Disease Brother     Kidney Disease Brother     Other Brother         liver disease         PHYSICAL EXAM:      Ht 5' 6\" (1.676 m)   Wt 161 lb (73 kg)   LMP  (LMP Unknown)   BMI 25.99 kg/m²  I            ASSESSMENT AND PLAN:    1. Procedure. options, risks and benefits reviewed with patient and expresses understanding.

## 2022-07-18 ENCOUNTER — HOSPITAL ENCOUNTER (OUTPATIENT)
Dept: MAMMOGRAPHY | Age: 72
Discharge: HOME | End: 2022-07-18
Payer: MEDICARE

## 2022-07-18 VITALS — WEIGHT: 157 LBS | HEIGHT: 66 IN | BODY MASS INDEX: 25.23 KG/M2

## 2022-07-18 DIAGNOSIS — Z12.31 VISIT FOR SCREENING MAMMOGRAM: ICD-10-CM

## 2022-07-18 PROCEDURE — 77067 SCR MAMMO BI INCL CAD: CPT

## 2022-07-19 NOTE — PROGRESS NOTES
[facility]  PRE-PROCEDURE INSTRUCTIONS    Procedure date_07/22/22________  Arrival time___0845_________        Procedure time___0945_________       Screening questions for Pain procedures:  Do you have a current infection? _________  Are you currently taking an antibiotic?______  Are you taking a blood thinner?________    It is not necessary to stop eating or drinking prior to this procedure. We would like you to take your medications for blood pressure as usual.  You may be asked to stop blood thinners such as Coumadin, Plavix, Fragmin, Lovenox, etc., or any anti-inflammatories such as:  Aspirin, Ibuprofen, Advil, Naproxen prior to your procedure. We also ask that you stop any OTC medications that cause additional bleeding    You must make arrangements for a responsible adult to arrive with you and stay in our waiting area during your procedure. They will also need to take you home after your procedure. For your safety you will not be allowed to leave alone or drive yourself home. Also for your safety, it is strongly suggested that someone stay with you the first 24 hours after your procedure. For your comfort, please wear simple loose fitting clothing to the center. Please do not bring valuables. If you have a living will and a durable power of  for healthcare, please bring in a copy.     You will need to bring a photo ID and insurance card    Our goal is to provide you with excellent care so if you have any questions, please contact us at the [facility] at [phone]

## 2022-07-22 ENCOUNTER — HOSPITAL ENCOUNTER (OUTPATIENT)
Age: 72
Setting detail: OUTPATIENT SURGERY
Discharge: HOME | End: 2022-07-22
Attending: ANESTHESIOLOGY | Admitting: ANESTHESIOLOGY
Payer: MEDICARE

## 2022-07-22 ENCOUNTER — CARE COORDINATION (OUTPATIENT)
Dept: CARE COORDINATION | Age: 72
End: 2022-07-22

## 2022-07-22 ENCOUNTER — APPOINTMENT (OUTPATIENT)
Dept: INTERVENTIONAL RADIOLOGY/VASCULAR | Age: 72
End: 2022-07-22
Attending: ANESTHESIOLOGY
Payer: MEDICARE

## 2022-07-22 VITALS
HEART RATE: 67 BPM | HEIGHT: 66 IN | RESPIRATION RATE: 18 BRPM | TEMPERATURE: 97.1 F | OXYGEN SATURATION: 96 % | BODY MASS INDEX: 25.71 KG/M2 | DIASTOLIC BLOOD PRESSURE: 76 MMHG | SYSTOLIC BLOOD PRESSURE: 159 MMHG | WEIGHT: 160 LBS

## 2022-07-22 PROCEDURE — 6360000002 HC RX W HCPCS: Performed by: ANESTHESIOLOGY

## 2022-07-22 PROCEDURE — 3610000056 HC PAIN LEVEL 4 BASE (NON-OR): Performed by: ANESTHESIOLOGY

## 2022-07-22 PROCEDURE — 2709999900 HC NON-CHARGEABLE SUPPLY: Performed by: ANESTHESIOLOGY

## 2022-07-22 PROCEDURE — 2500000003 HC RX 250 WO HCPCS: Performed by: ANESTHESIOLOGY

## 2022-07-22 RX ORDER — BUPIVACAINE HYDROCHLORIDE 5 MG/ML
INJECTION, SOLUTION EPIDURAL; INTRACAUDAL
Status: COMPLETED | OUTPATIENT
Start: 2022-07-22 | End: 2022-07-22

## 2022-07-22 RX ORDER — METHYLPREDNISOLONE ACETATE 80 MG/ML
INJECTION, SUSPENSION INTRA-ARTICULAR; INTRALESIONAL; INTRAMUSCULAR; SOFT TISSUE
Status: COMPLETED | OUTPATIENT
Start: 2022-07-22 | End: 2022-07-22

## 2022-07-22 RX ORDER — LIDOCAINE HYDROCHLORIDE 10 MG/ML
INJECTION, SOLUTION EPIDURAL; INFILTRATION; INTRACAUDAL; PERINEURAL
Status: COMPLETED | OUTPATIENT
Start: 2022-07-22 | End: 2022-07-22

## 2022-07-22 ASSESSMENT — PAIN DESCRIPTION - DESCRIPTORS
DESCRIPTORS: PRESSURE;TIGHTNESS;CRAMPING
DESCRIPTORS: STABBING

## 2022-07-22 ASSESSMENT — PAIN DESCRIPTION - LOCATION
LOCATION: BACK
LOCATION: BACK

## 2022-07-22 ASSESSMENT — PAIN DESCRIPTION - ORIENTATION
ORIENTATION: LOWER;MID
ORIENTATION: LOWER;MID

## 2022-07-22 ASSESSMENT — PAIN SCALES - GENERAL
PAINLEVEL_OUTOF10: 5
PAINLEVEL_OUTOF10: 5

## 2022-07-22 NOTE — CARE COORDINATION
Attempted to reach patient by phone. No answer. No vm set up could not leave a message. Will follow up at another date and time.

## 2022-07-22 NOTE — DISCHARGE INSTRUCTIONS
[facility]   [address], [address] [place] [address]     Patient:  Tomer Rosenberg  YOB: 1950  Medical Record #:  [id number]   Place: [address] [place] [address]  Date:  7/22/2022   Physician:  Rei Medina MD    Procedure Performed: [unfilled]     Discharge Instructions    Notify Pain Management Services if any of the following occur:    Redness/Swelling at the injection site lasting longer than 2 days  Fever (with redness, swelling, or drainage at the injection site)  Drainage at the injection site  New weakness/ numbness  Severe headache   Loss of bowel/ bladder function    General Instructions: You may experience numbness for several hours following your treatment. You should be cautious using those areas which are numb. Once the numbness wears off, you may apply ice or heat to injection site, if needed. Do not return to work or drive today    Rest today and return to normal activities tomorrow. On average, the steroid takes about 1 week to work and can be up to 2 weeks    You should continue to depend on your primary physician for your medical management of conditions not related to your pain management treatment. Continue to take all your other medications, including blood thinners, as directed by your primary physician unless otherwise instructed. NO changes have been made to your medications. Any changes listed on the discharge are based on patient self reporting. Any EMR warnings regarding drug/drug interactions were dismissed based on current use by the patient, management by prescribing physician and pharmacist and not managed by this physician. Any problem which relates specifically to a treatment or procedure performed today should be directed to the [facility].        [facility]  Division of Interventional Pain Management  [address] [id number] [phone]

## 2022-07-22 NOTE — OP NOTE
that injection and afterwards. A total of 3 cc of injectate was injected. The injectate contained 1 cc of depomedrol 40 mg per cc and 2 cc of Bupivacaine 0.5%. The injectate was injected in small increments while monitoring the patient. The patient tolerated the procedure well. There were no complications. The patient complained of no paresthesia during the injection. The needle was removed, the area was cleansed, and a Band-Aid was placed over the injection site. There were no complications. The patient tolerated the procedure well. The procedure was performed using local anesthesia. The patient was transferred with accompaniment to the ecovery area where the vital signs remained stable. The patient was discharged accompanied with an escort with written instructions after fulfilling standard discharge criteria.   Follow-up instructions were given to the patient and are as follows:      Estimated Blood Loss: 0ml      Plan:  Follow up in 4-6 weeks    Office: [phone]

## 2022-07-22 NOTE — H&P
Patient:  Silvana Morgan  YOB: 1950  Medical Record #:  [id number]   Place: [address] [place] [address]  Date:  7/22/2022   Physician:  Mark Toribio MD    History Obtained From: electronic medical record    HISTORY OF PRESENT ILLNESS    Past Medical History:        Diagnosis Date    Anxiety     Cervical herniated disc     Circulation problem     COPD (chronic obstructive pulmonary disease) ([facility])     CRF (chronic renal failure), stage 3 (moderate) ([facility])     Depression     Depression     Diverticulosis of colon     Fibromyalgia     Hx of blood clots     DVT    Hyperlipidemia     Joint ache     Kidney failure     states begining stage 4 since summer '17    Lumbar disc disease     Lumbar disc disease     Migraine     Muscle spasm of back     Pleural effusion     Prolonged emergence from general anesthesia     SVT (supraventricular tachycardia) ([facility])     Thyroid disease     HYPOTHYROIDISM    Varicose vein      Past Surgical History:        Procedure Laterality Date    COLONOSCOPY      HERNIA REPAIR      right inguinal and UMBILICAL-2 SURGERIES    HYSTERECTOMY (CERVIX STATUS UNKNOWN)      HYSTERECTOMY, VAGINAL      PAIN MANAGEMENT PROCEDURE Left 6/12/2020    LUMBAR EPIDURAL STEROID INJECTION LEFT L4-L5 performed by Leonard Wall MD at [address] Left 7/8/2022    LUMBAR EPIDURAL STEROID INJECTION   LEFT L4-5 performed by Leonard Wall MD at [facility] N/A 11/11/2020    CHOLECYSTECTOMY AND SIGMOID COLECTOMY performed by Gerardo Sharif MD at [address] [place] [address]       Medications Prior to Admission:   No current facility-administered medications on file prior to encounter.      Current Outpatient Medications on File Prior to Encounter   Medication Sig Dispense Refill    baclofen (LIORESAL) 10 MG tablet TAKE 1 TABLET BY MOUTH THREE TIMES DAILY AS NEEDED FOR MUSCLE SPASM 90 tablet 2    methylfolate (DEPLIN) 7.5 MG TABS tablet Take 7.5 tablets by mouth daily      traMADol (ULTRAM) 50 MG tablet Take 1 tablet by mouth 2 times daily as needed for Pain for up to 60 days. 60 tablet 1    umeclidinium-vilanterol (ANORO ELLIPTA) 62.5-25 MCG/INH AEPB inhaler Inhale 1 puff into the lungs daily 1 each 5    vitamin D3 (CHOLECALCIFEROL) 25 MCG (1000 UT) TABS tablet Take 1 tablet by mouth daily 30 tablet 5    albuterol sulfate  (90 Base) MCG/ACT inhaler INHALE 2 PUFFS BY MOUTH EVERY 6 HOURS AS NEEDED FOR WHEEZING 20.1 g 0    lovastatin (MEVACOR) 40 MG tablet TAKE 1 TABLET BY MOUTH EVERY NIGHT 90 tablet 1    gabapentin (NEURONTIN) 600 MG tablet TAKE 1 TABLET BY MOUTH THREE TIMES DAILY 270 tablet 0    levothyroxine (SYNTHROID) 25 MCG tablet TAKE 1 TABLET BY MOUTH DAILY 90 tablet 1    citalopram (CELEXA) 40 MG tablet TAKE 1 TABLET BY MOUTH DAILY 90 tablet 1    promethazine (PHENERGAN) 12.5 MG tablet Take 1 tablet by mouth every 8 hours as needed for Nausea TAKE 1 TABLET BY MOUTH EVERY DAY AS NEEDED 90 tablet 0    acetaminophen (TYLENOL) 500 MG tablet Take 500 mg by mouth every 6 hours as needed       albuterol (PROVENTIL) (2.5 MG/3ML) 0.083% nebulizer solution Take 3 mLs by nebulization every 6 hours as needed for Wheezing 120 each 0    dicyclomine (BENTYL) 10 MG capsule Take 1 capsule by mouth 4 times daily as needed (abdominal cramping and diarrhea) 120 capsule 1     Allergies:  Ciprofloxacin, Nsaids, Abilify [aripiprazole], Benadryl [diphenhydramine], Darvon [propoxyphene], Dilaudid [hydromorphone hcl], Hydrocodone-acetaminophen, Morphine, Percocet [oxycodone-acetaminophen], and Trintellix [vortioxetine]  Social History     Socioeconomic History    Marital status:       Spouse name: Not on file    Number of children: 2    Years of education: Not on file    Highest education level: Not on file   Occupational History    Occupation: NA   Tobacco Use    Smoking status: Every Day     Packs/day: 0.33     Years: 45.00 Pack years: 14.85     Types: Cigarettes     Start date: 8/16/2014    Smokeless tobacco: Never    Tobacco comments:     would like to quit   Vaping Use    Vaping Use: Never used   Substance and Sexual Activity    Alcohol use: No     Alcohol/week: 0.0 standard drinks    Drug use: Never    Sexual activity: Never   Other Topics Concern    Not on file   Social History Narrative    Not on file     Social Determinants of Health     Financial Resource Strain: Low Risk     Difficulty of Paying Living Expenses: Not hard at all   Food Insecurity: Food Insecurity Present    Worried About [facility] in the Last Year: Sometimes true    Ran Out of Food in the Last Year: Sometimes true   Transportation Needs: No Transportation Needs    Lack of Transportation (Medical): No    Lack of Transportation (Non-Medical): No   Physical Activity: Insufficiently Active    Days of Exercise per Week: 2 days    Minutes of Exercise per Session: 40 min   Stress: No Stress Concern Present    Feeling of Stress : Not at all   Social Connections: Socially Isolated    Frequency of Communication with Friends and Family: Three times a week    Frequency of Social Gatherings with Friends and Family: Three times a week    Attends Latter day Services: Never    Active Member of Clubs or Organizations: No    Attends Club or Organization Meetings: Never    Marital Status:     Intimate Partner Violence: Not on file   Housing Stability: Low Risk     Unable to Pay for Housing in the Last Year: No    Number of Places Lived in the Last Year: 1    Unstable Housing in the Last Year: No     Family History   Problem Relation Age of Onset    Hypertension Mother     Heart Failure Mother     Cancer Mother         ovarian     Stroke Father     Asthma Father     Heart Failure Brother     Cancer Brother         lung    Diabetes Brother     Heart Disease Brother     Kidney Disease Brother     Other Brother         liver disease         PHYSICAL EXAM:      BP Pamela Ben ) 142/65   Pulse 78   Temp 97 °F (36.1 °C) (Temporal)   Resp 16   Ht 5' 6\" (1.676 m)   Wt 160 lb (72.6 kg)   LMP  (LMP Unknown)   SpO2 97%   BMI 25.82 kg/m²  I            ASSESSMENT AND PLAN:    1. Procedure. options, risks and benefits reviewed with patient and expresses understanding.

## 2022-08-03 RX ORDER — GABAPENTIN 600 MG/1
TABLET ORAL
Qty: 270 TABLET | Refills: 0 | Status: SHIPPED | OUTPATIENT
Start: 2022-08-03 | End: 2022-11-02

## 2022-08-12 ENCOUNTER — CARE COORDINATION (OUTPATIENT)
Dept: CARE COORDINATION | Age: 72
End: 2022-08-12

## 2022-08-21 DIAGNOSIS — G89.4 CHRONIC PAIN SYNDROME: ICD-10-CM

## 2022-08-21 DIAGNOSIS — M51.9 LUMBAR DISC DISEASE: ICD-10-CM

## 2022-08-21 DIAGNOSIS — M47.812 ARTHRITIS OF NECK: ICD-10-CM

## 2022-08-22 RX ORDER — TRAMADOL HYDROCHLORIDE 50 MG/1
50 TABLET ORAL 2 TIMES DAILY PRN
Qty: 60 TABLET | Refills: 1 | Status: SHIPPED | OUTPATIENT
Start: 2022-08-22 | End: 2022-10-26

## 2022-08-23 NOTE — TELEPHONE ENCOUNTER
Pt advised. Number given to schedule.    [address]  and [facility], [facility], [facility]   [address], [place], [facility]   Ph: [phone] low hemoglobin/Gestational Age less than 36 Weeks/Placenta Accreta/Other

## 2022-08-31 ENCOUNTER — CARE COORDINATION (OUTPATIENT)
Dept: CARE COORDINATION | Age: 72
End: 2022-08-31

## 2022-08-31 NOTE — CARE COORDINATION
Ambulatory Care Coordination Note  8/31/2022    ACC: Elsie Ojeda, RN    Summary Note: Spoke to patient for cc follow up. Patient states she has been doing well. Denies sob,cp, cough no different from baseline. Received and understands handouts sent. Understands environmental factors that can contribute to exacerbations. Coa has informed patient she makes too much money from her monthly income/[facility]. Will send patient local food pantries for some help. Patient needed scheduled for AWV. Scheduled and patient confirmed date/time were good. No other questions, concerns during this outreach. FU COPD reassessment  FU local pantries for help with food  Reassess additional questions, concerns needs    Lab Results       None            Care Coordination Interventions    Referral from Primary Care Provider: No  Suggested Interventions and Community Resources  Fall Risk Prevention: Completed (Comment: 6/30/22 completed)  Meals on Wheels: Completed (Comment: 8/12/22 completed)  Smoking Cessation: Not Started (Comment: reattempt readiness after 8/31/22 outreach)  Other Services: Completed (Comment: coa referral 6/30/22)  Zone Management Tools: Completed (Comment: copd completed 8/31/22)  Other Services or Interventions: 8/31/22 sending local food panitries          Goals Addressed                      This Visit's Progress      Medication Management   On track      I will take my medication as directed. I will notify my provider of any problems with medications, like adverse effects or side effects. I will notify my provider/Care Coordinator if I am unable to afford my medications. I will notify my provider for advice before I stop taking any of my medication.     Barriers: lack of education  Plan for overcoming my barriers: acsae RN support.education  Confidence: 7/10  Anticipated Goal Completion Date: 18371        Patient Stated (pt-stated)   On track      I will schedule an appointment with my pcp for htn management. I will monitor my bp daily and record it in log provided by my chart. Barriers: Remembering to chart write/ log it daily. Plan for overcoming my barriers: Set BP cuff next to morning medications to remind to take it daily. Confidence: 5/10  Anticipated Goal Completion Date:9/2022              Prior to Admission medications    Medication Sig Start Date End Date Taking? Authorizing Provider   traMADol (ULTRAM) 50 MG tablet Take 1 tablet by mouth 2 times daily as needed for Pain for up to 60 days.  8/22/22 10/21/22  Heaven Espana MD   gabapentin (NEURONTIN) 600 MG tablet TAKE 1 TABLET BY MOUTH THREE TIMES DAILY 8/3/22 11/1/22  Heaven Espana MD   baclofen (LIORESAL) 10 MG tablet TAKE 1 TABLET BY MOUTH THREE TIMES DAILY AS NEEDED FOR MUSCLE SPASM 7/6/22   Heaven Espana MD   methylfolate (DEPLIN) 7.5 MG TABS tablet Take 7.5 tablets by mouth daily    Historical Provider, MD   umeclidinium-vilanterol Coralee Eliot ELLIPTA) 62.5-25 MCG/INH AEPB inhaler Inhale 1 puff into the lungs daily 5/23/22   Heaven Espana MD   vitamin D3 (CHOLECALCIFEROL) 25 MCG (1000 UT) TABS tablet Take 1 tablet by mouth daily 5/23/22   Heaven Espana MD   albuterol sulfate  (90 Base) MCG/ACT inhaler INHALE 2 PUFFS BY MOUTH EVERY 6 HOURS AS NEEDED FOR WHEEZING 5/23/22   BULMARO Tobin NP   lovastatin (MEVACOR) 40 MG tablet TAKE 1 TABLET BY MOUTH EVERY NIGHT 5/12/22   Heaven Espana MD   levothyroxine (SYNTHROID) 25 MCG tablet TAKE 1 TABLET BY MOUTH DAILY 4/18/22   BULMARO Tobin NP   citalopram (CELEXA) 40 MG tablet TAKE 1 TABLET BY MOUTH DAILY 12/27/21   BULMARO Tobin NP   promethazine (PHENERGAN) 12.5 MG tablet Take 1 tablet by mouth every 8 hours as needed for Nausea TAKE 1 TABLET BY MOUTH EVERY DAY AS NEEDED 10/28/21   BULMAOR Tobin NP   acetaminophen (TYLENOL) 500 MG tablet Take 500 mg by mouth every 6 hours as needed     Historical Provider, MD   albuterol (PROVENTIL) (2.5 MG/3ML) 0.083% nebulizer solution Take 3 mLs by nebulization every 6 hours as needed for Wheezing 9/1/20   Zulay Patel MD   dicyclomine (BENTYL) 10 MG capsule Take 1 capsule by mouth 4 times daily as needed (abdominal cramping and diarrhea) 12/5/19   Zulay Patel MD       Future Appointments   Date Time Provider Pablo Leticia   10/20/2022  9:45 AM Zulay Patel MD [facility]   ,   COPD Assessment    Does the patient understand envrionmental exposure?: Yes  Is the patient able to verbalize Rescue vs. Long Acting medications?: Yes  Does the patient have a nebulizer?: Yes  Does the patient use a space with inhaled medications?: No     No patient-reported symptoms         Symptoms:          , and   General Assessment    Do you have any symptoms that are causing concern?: No

## 2022-09-21 ENCOUNTER — CARE COORDINATION (OUTPATIENT)
Dept: CARE COORDINATION | Age: 72
End: 2022-09-21

## 2022-09-21 NOTE — CARE COORDINATION
Attempted to reach patient by phone. No answer. Left brief message with name, contact number and asked for return call back. Waiting for patient response at this time. Will update notes when callback is received. Will follow up at another date and time.

## 2022-10-05 ENCOUNTER — CARE COORDINATION (OUTPATIENT)
Dept: CARE COORDINATION | Age: 72
End: 2022-10-05

## 2022-10-05 DIAGNOSIS — J44.9 CHRONIC OBSTRUCTIVE PULMONARY DISEASE, UNSPECIFIED COPD TYPE (HCC): Primary | ICD-10-CM

## 2022-10-05 DIAGNOSIS — I10 HYPERTENSION, UNSPECIFIED TYPE: ICD-10-CM

## 2022-10-05 NOTE — PROGRESS NOTES
10/5/22 10:20 AM       Remote Patient Monitoring Treatment Plan    Received request from ACMICHAELA/NORMA Mills RN to order remote patient monitoring for in home monitoring of COPD and HTN and order completed. Patient will be monitoring blood pressure   pulse ox   weight  survey questions daily. Patient will engage in Remote Patient Monitoring each day to develop the skills necessary for self management. RPM Care Team Responsibilities:   Alerts will be reviewed daily and addressed within 2-4 hours during operational hours (Monday -Friday 9 am-4 pm)  Alert response and intervention documented in patient medical record  Alert response escalated to PCP per protocol and documented in patient medical record  Patient monitored over approximately  days  Discharge from program based on self-management readiness    See care coordination encounters for additional details.      Charan Sprague DNP, FNP-C, Remote Patient Monitoring NP, (Ph) [phone]

## 2022-10-05 NOTE — CARE COORDINATION
Spoke to patient for cc follow up. RPM offer accepted. Patient agreed it would be nice to monitor vs and o2. Patient states her daughter lives with her and can help her if needed. Remote Patient Monitoring Enrollment Note      Date/Time:  10/5/2022 9:58 AM    Offered patient enrollment in the [facility] Remote Patient Monitoring (RPM) program for in home monitoring for COPD and HTN. Patient accepted RPM services. Patient will be monitoring the following daily:  activity level  blood pressure reading  blood pressure heart rate  medications  pulse ox reading  pulse ox heart rate  survey response  temperature reading    ACM reviewed the information below with patient:    Emergency Contact: Julio Hubbard [phone]    [x] A member from the care coordination team will reach out to notify the patient once the RPM kit is ordered. [x] Once the kit is delivered, the [facility] team will contact the patient after UPS deliver to assist with set up. [x] Determined BP cuff size: regular (9.05\"-15.74\")      [] Determined weight scale:  na                                                  [x] Hours of ACM monitoring - Monday-Friday [id number]                         All questions about RPM program answered at this time.

## 2022-10-05 NOTE — CARE COORDINATION
Remote Patient Kit Ordering Note      Date/Time:  10/5/2022 11:09 AM      [x] CCSS confirmed patient shipping address  [x] Patient will receive package over the next 2-4 business days. Someone 21 years or older must be present to sign for UPS delivery. [x] Patient to contact virtual installation-specific phone number listed in the patient instructions. [x] If the patient does not contact HRS within 24 hours, an [address] will call the patient directly: If the patient does not answer, HRS will follow up with the clinical team notifying them about the unsuccessful attempt to contact the patient. HRS will make three call attempts to the patient. [x] LPN will contact patient once equipment is active to welcome them to the program.                                                         [x] Hours of RPM monitoring - Monday-Friday [id number]                     All questions answered at this time. ACM made aware the Remote Patient Monitoring set up has been completed. CCSS notified patient of RPM equipment order. Samira Krista, patient daughter states Mother has been having issues with her phone, which is why  was unable to reach. Louisa Sourav requests her number be the primary contact as she lives with patient and always has her phone.

## 2022-10-07 ENCOUNTER — CARE COORDINATION (OUTPATIENT)
Dept: CASE MANAGEMENT | Age: 72
End: 2022-10-07

## 2022-10-07 ENCOUNTER — CARE COORDINATION (OUTPATIENT)
Dept: CARE COORDINATION | Age: 72
End: 2022-10-07

## 2022-10-07 NOTE — CARE COORDINATION
Remote Patient Monitoring Welcome Note      Date/Time:  10/7/2022 1:27 PM    Verified patients name and  as identifiers. Completed and confirmed the following: Emergency Contact: Payal Black, Child, [phone] [x] Patient received all RPM equipment (tablet, scale, blood pressure device and cuff, and pulse oximeter) Cuff Size: Small [ ] Regular [x] Large [ ] Weight Scale: Regular [x] Bariatric [ ] [x] Instructed patient keep box for use when returning equipment [x] Reviewed Patient Welcome Letter with patient [x] Reviewed expectations for patient and care team [x] Reviewed RPM consent form [x] Instructed patient to keep scale on flat surface [x] Instructed patient to keep tablet plugged in at all times [x] Instructed how to contact IT support (number listed on welcome letter) [x] Provided Remote Patient Monitoring care  information All questions answered at this time.  Serge Torrez LPN, [facility] PH: [phone] ---- Current Patient Metrics ---- Activity: - mins Blood Pressure: 144/94, 84bpm Pulseox: 93%, 79bpm Survey: C Weight: 161.0lbs Note Created at: 10/07/2022 01:31 PM ET ---- Time-Spent: 5 minutes 0 seconds

## 2022-10-07 NOTE — CARE COORDINATION
Remote Patient Monitoring Note      Date/Time:  10/7/2022 3:54 PM    CCSS reviewed patients reported daily Remote Patient Monitoring metrics. All reported metrics are within normal limits. Plan/Follow Up:  Will continue to review, monitor and address alerts with follow up based on severity of symptoms and risk factors ---- Current Patient Metrics ---- Activity: - mins Blood Pressure: 144/94, 84bpm Pulseox: 93%, 79bpm Survey: C Weight: 161.0lbs Note Created at: 10/07/2022 03:54 PM ET ---- Time-Spent: 2 minutes 0 seconds

## 2022-10-10 ENCOUNTER — TELEPHONE (OUTPATIENT)
Dept: FAMILY MEDICINE CLINIC | Age: 72
End: 2022-10-10

## 2022-10-10 ENCOUNTER — CARE COORDINATION (OUTPATIENT)
Dept: CASE MANAGEMENT | Age: 72
End: 2022-10-10

## 2022-10-10 NOTE — CARE COORDINATION
Remote Patient Monitoring Note      Date/Time:  10/10/2022 1:42 PM  LPN contacted family by telephone regarding red alert received for blood pressure reading (196/103). Verified patients name and  as identifiers. Background: RPM for HTN, COPD Clinical Interventions: LPN reviewed red alert r/t BP of 196/103. Daughter stated that pt started to present with \"cold\" symptoms over the weekend. Pt c/o congestion, cough on Saturday and . Daughter denied that the pt reported HA, fever, SOB and chest pain. Daughter stated that she is not home with pt currently but is on her way and will recheck pt's BP as soon as she gets there. Writer confirmed that pt is aware of proper cuff placement to obtain an accurate reading. Writer will route to PCP and ACM to advise. Plan/Follow Up: Will continue to review, monitor and address alerts with follow up based on severity of symptoms and risk factors.  ---- Current Patient Metrics ---- Activity: 15 mins Blood Pressure: 196/103, 78bpm Pulseox: 97%, 72bpm Survey: C Weight: 160.0lbs Note Created at: 10/10/2022 01:48 PM ET ---- Time-Spent: 5 minutes 0 seconds

## 2022-10-11 ENCOUNTER — OFFICE VISIT (OUTPATIENT)
Dept: FAMILY MEDICINE CLINIC | Age: 72
End: 2022-10-11

## 2022-10-11 ENCOUNTER — CARE COORDINATION (OUTPATIENT)
Dept: CASE MANAGEMENT | Age: 72
End: 2022-10-11

## 2022-10-11 DIAGNOSIS — G89.4 CHRONIC PAIN SYNDROME: ICD-10-CM

## 2022-10-11 DIAGNOSIS — N18.32 STAGE 3B CHRONIC KIDNEY DISEASE (HCC): Primary | ICD-10-CM

## 2022-10-11 DIAGNOSIS — N18.9 ACUTE KIDNEY INJURY SUPERIMPOSED ON CHRONIC KIDNEY DISEASE (HCC): ICD-10-CM

## 2022-10-11 DIAGNOSIS — F51.04 CHRONIC INSOMNIA: ICD-10-CM

## 2022-10-11 DIAGNOSIS — R51.9 CHRONIC NONINTRACTABLE HEADACHE, UNSPECIFIED HEADACHE TYPE: ICD-10-CM

## 2022-10-11 DIAGNOSIS — N18.32 STAGE 3B CHRONIC KIDNEY DISEASE (HCC): ICD-10-CM

## 2022-10-11 DIAGNOSIS — G89.29 CHRONIC NONINTRACTABLE HEADACHE, UNSPECIFIED HEADACHE TYPE: ICD-10-CM

## 2022-10-11 DIAGNOSIS — I10 PRIMARY HYPERTENSION: Primary | ICD-10-CM

## 2022-10-11 DIAGNOSIS — N17.9 ACUTE KIDNEY INJURY SUPERIMPOSED ON CHRONIC KIDNEY DISEASE (HCC): ICD-10-CM

## 2022-10-11 LAB
ALBUMIN SERPL-MCNC: 4 G/DL (ref 3.4–5)
ANION GAP SERPL CALCULATED.3IONS-SCNC: 13 MMOL/L (ref 3–16)
BUN BLDV-MCNC: 17 MG/DL (ref 7–20)
CALCIUM SERPL-MCNC: 9 MG/DL (ref 8.3–10.6)
CHLORIDE BLD-SCNC: 99 MMOL/L (ref 99–110)
CO2: 26 MMOL/L (ref 21–32)
CREAT SERPL-MCNC: 1.4 MG/DL (ref 0.6–1.2)
GFR AFRICAN AMERICAN: 45
GFR NON-AFRICAN AMERICAN: 37
GLUCOSE BLD-MCNC: 73 MG/DL (ref 70–99)
PHOSPHORUS: 4.2 MG/DL (ref 2.5–4.9)
POTASSIUM SERPL-SCNC: 4.2 MMOL/L (ref 3.5–5.1)
SODIUM BLD-SCNC: 138 MMOL/L (ref 136–145)

## 2022-10-11 RX ORDER — AMITRIPTYLINE HYDROCHLORIDE 25 MG/1
25 TABLET, FILM COATED ORAL NIGHTLY
Qty: 90 TABLET | Refills: 1 | Status: SHIPPED | OUTPATIENT
Start: 2022-10-11 | End: 2022-10-26

## 2022-10-11 RX ORDER — LOSARTAN POTASSIUM 25 MG/1
25 TABLET ORAL DAILY
Qty: 30 TABLET | Refills: 5 | Status: SHIPPED | OUTPATIENT
Start: 2022-10-11

## 2022-10-11 RX ORDER — LOSARTAN POTASSIUM 25 MG/1
25 TABLET ORAL DAILY
Qty: 90 TABLET | OUTPATIENT
Start: 2022-10-11

## 2022-10-11 NOTE — PROGRESS NOTES
SUBJECTIVE:  Delroy Mariano is a 71 y.o. female being evaluated for:    Chief Complaint   Patient presents with    Hypertension     X1 day BP running high 196/103, 183/96, 156/80       HPI   In a progam at the hospital  Has had some chronic headaches and is off balance  No change from her normal  Checked with cuff twice Manually took last night high but better   Epidurals not helpful only lasted for 2 days Tramadol helps some days some days not and pain is worsening  Still having headaches  chronic Laying down helps  Not too often needs to do this headaches daily Hx of migraines Top of head Neck does hurt  No change in vision   Allergies   Allergen Reactions    Ciprofloxacin Nausea And Vomiting     SEVERE    Nsaids      Kidney disease    Abilify [Aripiprazole] Itching    Benadryl [Diphenhydramine] Other (See Comments)     COMPLETTELY SEDATES HER-PER DAUGHTER    Darvon [Propoxyphene] Rash    Dilaudid [Hydromorphone Hcl] Itching     CAN TOLERATE 1 DOSE IF NOT GIVEN BACK TO BACK-    Hydrocodone-Acetaminophen Nausea And Vomiting     8/23/2020--pt states that only the hydrodocone-acetaminophen combination causes a reaction, but pt doesn't react to acetaminophen by Bonnie Terry, RN    Morphine Itching and Nausea And Vomiting    Percocet [Oxycodone-Acetaminophen] Other (See Comments)     Makes her loopy,HALLUCINATIONS    Trintellix [Vortioxetine] Other (See Comments)     Hands/feet jittery, nausea     Current Outpatient Medications   Medication Sig Dispense Refill    Doxylamine Succinate, Sleep, (SLEEP AID PO) Take 2 tablets by mouth nightly      traMADol (ULTRAM) 50 MG tablet Take 1 tablet by mouth 2 times daily as needed for Pain for up to 60 days.  60 tablet 1    gabapentin (NEURONTIN) 600 MG tablet TAKE 1 TABLET BY MOUTH THREE TIMES DAILY 270 tablet 0    baclofen (LIORESAL) 10 MG tablet TAKE 1 TABLET BY MOUTH THREE TIMES DAILY AS NEEDED FOR MUSCLE SPASM 90 tablet 2    methylfolate (DEPLIN) 7.5 MG TABS tablet Take 7.5 tablets by mouth daily      umeclidinium-vilanterol (ANORO ELLIPTA) 62.5-25 MCG/INH AEPB inhaler Inhale 1 puff into the lungs daily 1 each 5    vitamin D3 (CHOLECALCIFEROL) 25 MCG (1000 UT) TABS tablet Take 1 tablet by mouth daily 30 tablet 5    albuterol sulfate  (90 Base) MCG/ACT inhaler INHALE 2 PUFFS BY MOUTH EVERY 6 HOURS AS NEEDED FOR WHEEZING 20.1 g 0    lovastatin (MEVACOR) 40 MG tablet TAKE 1 TABLET BY MOUTH EVERY NIGHT 90 tablet 1    levothyroxine (SYNTHROID) 25 MCG tablet TAKE 1 TABLET BY MOUTH DAILY 90 tablet 1    citalopram (CELEXA) 40 MG tablet TAKE 1 TABLET BY MOUTH DAILY 90 tablet 1    promethazine (PHENERGAN) 12.5 MG tablet Take 1 tablet by mouth every 8 hours as needed for Nausea TAKE 1 TABLET BY MOUTH EVERY DAY AS NEEDED 90 tablet 0    acetaminophen (TYLENOL) 500 MG tablet Take 500 mg by mouth every 6 hours as needed       albuterol (PROVENTIL) (2.5 MG/3ML) 0.083% nebulizer solution Take 3 mLs by nebulization every 6 hours as needed for Wheezing 120 each 0    dicyclomine (BENTYL) 10 MG capsule Take 1 capsule by mouth 4 times daily as needed (abdominal cramping and diarrhea) 120 capsule 1     No current facility-administered medications for this visit. Social History     Socioeconomic History    Marital status:       Spouse name: Not on file    Number of children: 2    Years of education: Not on file    Highest education level: Not on file   Occupational History    Occupation: NA   Tobacco Use    Smoking status: Every Day     Packs/day: 0.33     Years: 45.00     Pack years: 14.85     Types: Cigarettes     Start date: 8/16/2014    Smokeless tobacco: Never    Tobacco comments:     would like to quit   Vaping Use    Vaping Use: Never used   Substance and Sexual Activity    Alcohol use: No     Alcohol/week: 0.0 standard drinks    Drug use: Never    Sexual activity: Never   Other Topics Concern    Not on file   Social History Narrative    Not on file     Social Determinants of Health     Financial Resource Strain: Low Risk     Difficulty of Paying Living Expenses: Not hard at all   Food Insecurity: Food Insecurity Present    Worried About [address] in the Last Year: Sometimes true    Dora of Food in the Last Year: Sometimes true   Transportation Needs: No Transportation Needs    Lack of Transportation (Medical): No    Lack of Transportation (Non-Medical): No   Physical Activity: Insufficiently Active    Days of Exercise per Week: 2 days    Minutes of Exercise per Session: 40 min   Stress: No Stress Concern Present    Feeling of Stress : Not at all   Social Connections: Socially Isolated    Frequency of Communication with Friends and Family: Three times a week    Frequency of Social Gatherings with Friends and Family: Three times a week    Attends Restorationist Services: Never    Active Member of Clubs or Organizations: No    Attends Club or Organization Meetings: Never    Marital Status:     Intimate Partner Violence: Not on file   Housing Stability: Low Risk     Unable to Pay for Housing in the Last Year: No    Number of [place] in the Last Year: 1    Unstable Housing in the Last Year: No      Past Medical History:   Diagnosis Date    Anxiety     Cervical herniated disc     Circulation problem     COPD (chronic obstructive pulmonary disease) ([address]     CRF (chronic renal failure), stage 3 (moderate) ([address]     Depression     Depression     Diverticulosis of colon     Fibromyalgia     Hx of blood clots     DVT    Hyperlipidemia     Joint ache     Kidney failure     states begining stage 4 since summer '17    Lumbar disc disease     Lumbar disc disease     Migraine     Muscle spasm of back     Pleural effusion     Prolonged emergence from general anesthesia     SVT (supraventricular tachycardia) ([address]     Thyroid disease     HYPOTHYROIDISM    Varicose vein      Past Surgical History:   Procedure Laterality Date    BACK INJECTION Bilateral 7/22/2022    BILATERAL SACROILIAC JOINT INJECTION performed by González Morales MD at [address]      right inguinal and UMBILICAL-2 SURGERIES    HYSTERECTOMY (CERVIX STATUS UNKNOWN)      HYSTERECTOMY, VAGINAL      PAIN MANAGEMENT PROCEDURE Left 6/12/2020    LUMBAR EPIDURAL STEROID INJECTION LEFT L4-L5 performed by González Morales MD at [address] Left 7/8/2022    LUMBAR EPIDURAL STEROID INJECTION   LEFT L4-5 performed by González Morales MD at [address] N/A 11/11/2020    CHOLECYSTECTOMY AND SIGMOID COLECTOMY performed by Chon Bonds MD at [facility]         Review of Systems    OBJECTIVE:  /70 (Site: Left Upper Arm, Position: Sitting, Cuff Size: Medium Adult)   Pulse 73   Temp 97.7 °F (36.5 °C) (Oral)   Resp 16   Wt 162 lb (73.5 kg)   LMP  (LMP Unknown)   SpO2 95%   BMI 26.15 kg/m²      Body mass index is 26.15 kg/m². Physical Exam    ASSESSMENT/PLAN:    There are no diagnoses linked to this encounter. No orders of the defined types were placed in this encounter. No follow-ups on file. There are no Patient Instructions on file for this visit.

## 2022-10-11 NOTE — CARE COORDINATION
Remote Patient Monitoring Note      Date/Time:  10/11/2022 9:54 AM    LPN reviewed patients reported daily Remote Patient Monitoring metrics. All reported metrics are within normal limits. Plan/Follow Up: Will continue to review, monitor and address alerts with follow up based on severity of symptoms and risk factors Pt will alert for survey until survey is completed again on Thursday.  Destiney Mckenzie LPN, [facility] PH: [phone] ---- Current Patient Metrics ---- Activity: - mins Blood Pressure: 154/77, 82bpm Pulseox: 95%, 79bpm Survey: - Weight: 160.4lbs Note Created at: 10/11/2022 09:58 AM ET ---- Time-Spent: 2 minutes 0 seconds

## 2022-10-11 NOTE — PROGRESS NOTES
SUBJECTIVE:  Luciano Moya is a 71 y.o. female being evaluated for:    Chief Complaint   Patient presents with    Hypertension     X1 day BP running high 196/103, 183/96, 156/80       HPI   Today because blood pressure has been high  Screenings in the morning from the hospital program  NO headaches nose bleeds cp or dizziness      Allergies   Allergen Reactions    Ciprofloxacin Nausea And Vomiting     SEVERE    Nsaids      Kidney disease    Abilify [Aripiprazole] Itching    Benadryl [Diphenhydramine] Other (See Comments)     COMPLETTELY SEDATES HER-PER DAUGHTER    Darvon [Propoxyphene] Rash    Dilaudid [Hydromorphone Hcl] Itching     CAN TOLERATE 1 DOSE IF NOT GIVEN BACK TO BACK-    Hydrocodone-Acetaminophen Nausea And Vomiting     8/23/2020--pt states that only the hydrodocone-acetaminophen combination causes a reaction, but pt doesn't react to acetaminophen by Kelly Moreno, RN    Morphine Itching and Nausea And Vomiting    Percocet [Oxycodone-Acetaminophen] Other (See Comments)     Makes her loopy,HALLUCINATIONS    Trintellix [Vortioxetine] Other (See Comments)     Hands/feet jittery, nausea     Current Outpatient Medications   Medication Sig Dispense Refill    Doxylamine Succinate, Sleep, (SLEEP AID PO) Take 2 tablets by mouth nightly      amitriptyline (ELAVIL) 25 MG tablet Take 1 tablet by mouth nightly 90 tablet 1    losartan (COZAAR) 25 MG tablet Take 1 tablet by mouth daily 30 tablet 5    traMADol (ULTRAM) 50 MG tablet Take 1 tablet by mouth 2 times daily as needed for Pain for up to 60 days.  60 tablet 1    gabapentin (NEURONTIN) 600 MG tablet TAKE 1 TABLET BY MOUTH THREE TIMES DAILY 270 tablet 0    baclofen (LIORESAL) 10 MG tablet TAKE 1 TABLET BY MOUTH THREE TIMES DAILY AS NEEDED FOR MUSCLE SPASM 90 tablet 2    methylfolate (DEPLIN) 7.5 MG TABS tablet Take 7.5 tablets by mouth daily      umeclidinium-vilanterol (ANORO ELLIPTA) 62.5-25 MCG/INH AEPB inhaler Inhale 1 puff into the lungs daily 1 each 5 vitamin D3 (CHOLECALCIFEROL) 25 MCG (1000 UT) TABS tablet Take 1 tablet by mouth daily 30 tablet 5    albuterol sulfate  (90 Base) MCG/ACT inhaler INHALE 2 PUFFS BY MOUTH EVERY 6 HOURS AS NEEDED FOR WHEEZING 20.1 g 0    lovastatin (MEVACOR) 40 MG tablet TAKE 1 TABLET BY MOUTH EVERY NIGHT 90 tablet 1    citalopram (CELEXA) 40 MG tablet TAKE 1 TABLET BY MOUTH DAILY 90 tablet 1    promethazine (PHENERGAN) 12.5 MG tablet Take 1 tablet by mouth every 8 hours as needed for Nausea TAKE 1 TABLET BY MOUTH EVERY DAY AS NEEDED 90 tablet 0    acetaminophen (TYLENOL) 500 MG tablet Take 500 mg by mouth every 6 hours as needed       albuterol (PROVENTIL) (2.5 MG/3ML) 0.083% nebulizer solution Take 3 mLs by nebulization every 6 hours as needed for Wheezing 120 each 0    dicyclomine (BENTYL) 10 MG capsule Take 1 capsule by mouth 4 times daily as needed (abdominal cramping and diarrhea) 120 capsule 1    levothyroxine (SYNTHROID) 25 MCG tablet TAKE 1 TABLET BY MOUTH DAILY 90 tablet 1     No current facility-administered medications for this visit. Social History     Socioeconomic History    Marital status:       Spouse name: Not on file    Number of children: 2    Years of education: Not on file    Highest education level: Not on file   Occupational History    Occupation: NA   Tobacco Use    Smoking status: Every Day     Packs/day: 0.33     Years: 45.00     Pack years: 14.85     Types: Cigarettes     Start date: 8/16/2014    Smokeless tobacco: Never    Tobacco comments:     would like to quit   Vaping Use    Vaping Use: Never used   Substance and Sexual Activity    Alcohol use: No     Alcohol/week: 0.0 standard drinks    Drug use: Never    Sexual activity: Never   Other Topics Concern    Not on file   Social History Narrative    Not on file     Social Determinants of Health     Financial Resource Strain: Low Risk     Difficulty of Paying Living Expenses: Not hard at all   Food Insecurity: [address] Present    Worried About [facility] in the Last Year: Sometimes true    Ran Out of Food in the Last Year: Sometimes true   Transportation Needs: No Transportation Needs    Lack of Transportation (Medical): No    Lack of Transportation (Non-Medical): No   Physical Activity: Insufficiently Active    Days of Exercise per Week: 2 days    Minutes of Exercise per Session: 40 min   Stress: No Stress Concern Present    Feeling of Stress : Not at all   Social Connections: Socially Isolated    Frequency of Communication with Friends and Family: Three times a week    Frequency of Social Gatherings with Friends and Family: Three times a week    Attends Hindu Services: Never    Active Member of Clubs or Organizations: No    Attends Club or Organization Meetings: Never    Marital Status:     Intimate Partner Violence: Not on file   Housing Stability: Low Risk     Unable to Pay for Housing in the Last Year: No    Number of Places Lived in the Last Year: 1    Unstable Housing in the Last Year: No      Past Medical History:   Diagnosis Date    Anxiety     Cervical herniated disc     Circulation problem     COPD (chronic obstructive pulmonary disease) (HCC)     CRF (chronic renal failure), stage 3 (moderate) ([facility])     Depression     Depression     Diverticulosis of colon     Fibromyalgia     Hx of blood clots     DVT    Hyperlipidemia     Joint ache     Kidney failure     states begining stage 4 since summer '17    Lumbar disc disease     Lumbar disc disease     Migraine     Muscle spasm of back     Pleural effusion     Prolonged emergence from general anesthesia     SVT (supraventricular tachycardia) ([address]     Thyroid disease     HYPOTHYROIDISM    Varicose vein      Past Surgical History:   Procedure Laterality Date    BACK INJECTION Bilateral 7/22/2022    BILATERAL SACROILIAC JOINT INJECTION performed by Samy Hill MD at [address]      right inguinal and UMBILICAL-2 SURGERIES    HYSTERECTOMY (CERVIX STATUS UNKNOWN)      HYSTERECTOMY, VAGINAL      PAIN MANAGEMENT PROCEDURE Left 6/12/2020    LUMBAR EPIDURAL STEROID INJECTION LEFT L4-L5 performed by Jennifer Nguyen MD at [address] Left 7/8/2022    LUMBAR EPIDURAL STEROID INJECTION   LEFT L4-5 performed by Jennifer Nguyen MD at [facility] N/A 11/11/2020    CHOLECYSTECTOMY AND SIGMOID COLECTOMY performed by Braden Ayala MD at [address] [place] [address]         Review of Systems   Constitutional:  Negative for activity change, fatigue and unexpected weight change. HENT:  Negative for nosebleeds. Respiratory:  Negative for cough and shortness of breath. Cardiovascular:  Negative for chest pain, palpitations and leg swelling. Gastrointestinal:  Negative for abdominal pain, blood in stool, constipation, diarrhea, nausea and vomiting. Genitourinary:  Negative for dysuria. Musculoskeletal:  Positive for arthralgias (chronic back, right knee). Neurological:  Negative for dizziness, speech difficulty, weakness, light-headedness and headaches. Psychiatric/Behavioral:  Positive for dysphoric mood. Negative for sleep disturbance. OBJECTIVE:  /70 (Site: Left Upper Arm, Position: Sitting, Cuff Size: Medium Adult)   Pulse 73   Temp 97.7 °F (36.5 °C) (Oral)   Resp 16   Wt 162 lb (73.5 kg)   LMP  (LMP Unknown)   SpO2 95%   BMI 26.15 kg/m²      Body mass index is 26.15 kg/m². Physical Exam    ASSESSMENT/PLAN:    There are no diagnoses linked to this encounter.     Orders Placed This Encounter   Medications    amitriptyline (ELAVIL) 25 MG tablet     Sig: Take 1 tablet by mouth nightly     Dispense:  90 tablet     Refill:  1    losartan (COZAAR) 25 MG tablet     Sig: Take 1 tablet by mouth daily     Dispense:  30 tablet     Refill:  5        Return in about 3 months (around 1/11/2023), or if symptoms worsen or fail to improve, for medicare wellness . There are no Patient Instructions on file for this visit.

## 2022-10-12 ENCOUNTER — CARE COORDINATION (OUTPATIENT)
Dept: CASE MANAGEMENT | Age: 72
End: 2022-10-12

## 2022-10-12 NOTE — CARE COORDINATION
Remote Patient Monitoring Note      Date/Time:  10/12/2022 12:44 PM  LPN reviewed patients reported daily Remote Patient Monitoring metrics. All reported metrics are within normal limits. Yellow for survey until completed again on Thursday. Plan/Follow Up:  Will continue to review, monitor and address alerts with follow up based on severity of symptoms and risk factors Carrington Reynolds LPN, [facility] PH: [phone] ---- Current Patient Metrics ---- Activity: 20 mins Blood Pressure: 121/69, 81bpm Pulseox: 93%, 78bpm Survey: - Weight: 163.0lbs Note Created at: 10/12/2022 12:45 PM ET ---- Time-Spent: 2 minutes 0 seconds

## 2022-10-13 ENCOUNTER — CARE COORDINATION (OUTPATIENT)
Dept: CASE MANAGEMENT | Age: 72
End: 2022-10-13

## 2022-10-13 ENCOUNTER — TELEPHONE (OUTPATIENT)
Dept: FAMILY MEDICINE CLINIC | Age: 72
End: 2022-10-13

## 2022-10-13 DIAGNOSIS — N18.32 STAGE 3B CHRONIC KIDNEY DISEASE (HCC): Primary | ICD-10-CM

## 2022-10-13 RX ORDER — LEVOTHYROXINE SODIUM 0.03 MG/1
25 TABLET ORAL DAILY
Qty: 90 TABLET | Refills: 1 | Status: SHIPPED | OUTPATIENT
Start: 2022-10-13

## 2022-10-13 NOTE — TELEPHONE ENCOUNTER
Pt's daughter informed referral is done to Dr Mei Barrow.    [facility] [address]   [address] [place] [address]   Phone: [phone]

## 2022-10-13 NOTE — TELEPHONE ENCOUNTER
----- Message from Malia Li sent at 10/13/2022 10:56 AM EDT -----  Subject: Message to Provider    QUESTIONS  Information for Provider? Talat Pruett calling for referral to kidney Doctor   Modesta Anderson. Has seen Dr. Obed Tarango for Kidney previously and would like   her to go back there instead. Please have pcp Dalila Laboy or staff call   to discuss. ---------------------------------------------------------------------------  --------------  Ofelia Smith INFO  [phone]; OK to leave message on voicemail  ---------------------------------------------------------------------------  --------------  SCRIPT ANSWERS  Relationship to Patient? Other  Representative Name? Talat Pruett  Is the Representative on the appropriate HIPAA document in Epic?  Yes

## 2022-10-13 NOTE — CARE COORDINATION
Remote Patient Monitoring Note      Date/Time:  10/13/2022 2:55 PM  LPN contacted family by telephone regarding yellow alert received for survey response (No response). Verified patients name and  as identifiers. Background: RPM for COPD and HTN Clinical Interventions: Reviewed and followed up on alerts and treatments-LPN contacted, Nichole Messina, to advise of survey to be completed on  only. Nichole Messina will advise pt to complete surveys today and on . Plan/Follow Up: Will continue to review, monitor and address alerts with follow up based on severity of symptoms and risk factors.  Robert Young LPN, [facility] PH: [phone] ---- Current Patient Metrics ---- Activity: - mins Blood Pressure: 167/87, 90bpm Pulseox: 97%, 83bpm Survey: - Weight: 162.0lbs Note Created at: 10/13/2022 02:57 PM ET ---- Time-Spent: 5 minutes 0 seconds

## 2022-10-14 ENCOUNTER — CARE COORDINATION (OUTPATIENT)
Dept: CASE MANAGEMENT | Age: 72
End: 2022-10-14

## 2022-10-14 NOTE — CARE COORDINATION
Remote Patient Monitoring Note      Date/Time:  10/14/2022 12:00 PM  LPN reviewed patients reported daily Remote Patient Monitoring metrics. All reported metrics are within normal limits. Yellow alert for survey only as pt was unable to access the survey on yesterday due to tech issues. Plan/Follow Up:  Will continue to review, monitor and address alerts with follow up based on severity of symptoms and risk factors Vincenzo Dhaliwal LPN, [facility] PH: [phone] ---- Current Patient Metrics ---- Activity: - mins Blood Pressure: 155/99, 88bpm Pulseox: 95%, 84bpm Survey: C Weight: 160.0lbs Note Created at: 10/14/2022 12:01 PM ET ---- Time-Spent: 2 minutes 0 seconds

## 2022-10-15 VITALS
TEMPERATURE: 97.7 F | BODY MASS INDEX: 26.15 KG/M2 | OXYGEN SATURATION: 95 % | SYSTOLIC BLOOD PRESSURE: 152 MMHG | RESPIRATION RATE: 16 BRPM | WEIGHT: 162 LBS | DIASTOLIC BLOOD PRESSURE: 82 MMHG | HEART RATE: 73 BPM

## 2022-10-15 PROBLEM — E11.22 TYPE 2 DIABETES MELLITUS WITH CHRONIC KIDNEY DISEASE (HCC): Status: ACTIVE | Noted: 2022-10-15

## 2022-10-15 ASSESSMENT — ENCOUNTER SYMPTOMS
CONSTIPATION: 0
NAUSEA: 0
SHORTNESS OF BREATH: 0
ABDOMINAL PAIN: 0
VOMITING: 0
DIARRHEA: 0
BLOOD IN STOOL: 0
COUGH: 0

## 2022-10-15 NOTE — PROGRESS NOTES
SUBJECTIVE:  Giuliana Paula is a 71 y.o. female being evaluated for:    Chief Complaint   Patient presents with    Hypertension     X1 day BP running high 196/103, 183/96, 156/80       HPI   In a progam at the hospital  Has had some chronic headaches and is off balance  No change from her normal  Checked with cuff twice Manually took last night high but better   Epidurals not helpful only lasted for 2 days Tramadol helps some days some days not and pain is worsening  Still having headaches  chronic Laying down helps  Not too often needs to do this headaches daily Hx of migraines Top of head Neck does hurt  No change in vision   Allergies   Allergen Reactions    Ciprofloxacin Nausea And Vomiting     SEVERE    Nsaids      Kidney disease    Abilify [Aripiprazole] Itching    Benadryl [Diphenhydramine] Other (See Comments)     COMPLETTELY SEDATES HER-PER DAUGHTER    Darvon [Propoxyphene] Rash    Dilaudid [Hydromorphone Hcl] Itching     CAN TOLERATE 1 DOSE IF NOT GIVEN BACK TO BACK-    Hydrocodone-Acetaminophen Nausea And Vomiting     8/23/2020--pt states that only the hydrodocone-acetaminophen combination causes a reaction, but pt doesn't react to acetaminophen by Camilla Castillo, RN    Morphine Itching and Nausea And Vomiting    Percocet [Oxycodone-Acetaminophen] Other (See Comments)     Makes her loopy,HALLUCINATIONS    Trintellix [Vortioxetine] Other (See Comments)     Hands/feet jittery, nausea     Current Outpatient Medications   Medication Sig Dispense Refill    Doxylamine Succinate, Sleep, (SLEEP AID PO) Take 2 tablets by mouth nightly      amitriptyline (ELAVIL) 25 MG tablet Take 1 tablet by mouth nightly 90 tablet 1    losartan (COZAAR) 25 MG tablet Take 1 tablet by mouth daily 30 tablet 5    traMADol (ULTRAM) 50 MG tablet Take 1 tablet by mouth 2 times daily as needed for Pain for up to 60 days.  60 tablet 1    gabapentin (NEURONTIN) 600 MG tablet TAKE 1 TABLET BY MOUTH THREE TIMES DAILY 270 tablet 0 baclofen (LIORESAL) 10 MG tablet TAKE 1 TABLET BY MOUTH THREE TIMES DAILY AS NEEDED FOR MUSCLE SPASM 90 tablet 2    methylfolate (DEPLIN) 7.5 MG TABS tablet Take 7.5 tablets by mouth daily      umeclidinium-vilanterol (ANORO ELLIPTA) 62.5-25 MCG/INH AEPB inhaler Inhale 1 puff into the lungs daily 1 each 5    vitamin D3 (CHOLECALCIFEROL) 25 MCG (1000 UT) TABS tablet Take 1 tablet by mouth daily 30 tablet 5    albuterol sulfate  (90 Base) MCG/ACT inhaler INHALE 2 PUFFS BY MOUTH EVERY 6 HOURS AS NEEDED FOR WHEEZING 20.1 g 0    lovastatin (MEVACOR) 40 MG tablet TAKE 1 TABLET BY MOUTH EVERY NIGHT 90 tablet 1    citalopram (CELEXA) 40 MG tablet TAKE 1 TABLET BY MOUTH DAILY 90 tablet 1    promethazine (PHENERGAN) 12.5 MG tablet Take 1 tablet by mouth every 8 hours as needed for Nausea TAKE 1 TABLET BY MOUTH EVERY DAY AS NEEDED 90 tablet 0    acetaminophen (TYLENOL) 500 MG tablet Take 500 mg by mouth every 6 hours as needed       albuterol (PROVENTIL) (2.5 MG/3ML) 0.083% nebulizer solution Take 3 mLs by nebulization every 6 hours as needed for Wheezing 120 each 0    dicyclomine (BENTYL) 10 MG capsule Take 1 capsule by mouth 4 times daily as needed (abdominal cramping and diarrhea) 120 capsule 1    levothyroxine (SYNTHROID) 25 MCG tablet TAKE 1 TABLET BY MOUTH DAILY 90 tablet 1     No current facility-administered medications for this visit. Social History     Socioeconomic History    Marital status:       Spouse name: Not on file    Number of children: 2    Years of education: Not on file    Highest education level: Not on file   Occupational History    Occupation: NA   Tobacco Use    Smoking status: Every Day     Packs/day: 0.33     Years: 45.00     Pack years: 14.85     Types: Cigarettes     Start date: 8/16/2014    Smokeless tobacco: Never    Tobacco comments:     would like to quit   Vaping Use    Vaping Use: Never used   Substance and Sexual Activity    Alcohol use: No     Alcohol/week: 0.0 standard drinks    Drug use: Never    Sexual activity: Never   Other Topics Concern    Not on file   Social History Narrative    Not on file     Social Determinants of Health     Financial Resource Strain: Low Risk     Difficulty of Paying Living Expenses: Not hard at all   Food Insecurity: Food Insecurity Present    Worried About [facility] in the Last Year: Sometimes true    Ran Out of Food in the Last Year: Sometimes true   Transportation Needs: No Transportation Needs    Lack of Transportation (Medical): No    Lack of Transportation (Non-Medical): No   Physical Activity: Insufficiently Active    Days of Exercise per Week: 2 days    Minutes of Exercise per Session: 40 min   Stress: No Stress Concern Present    Feeling of Stress : Not at all   Social Connections: Socially Isolated    Frequency of Communication with Friends and Family: Three times a week    Frequency of Social Gatherings with Friends and Family: Three times a week    Attends Adventism Services: Never    Active Member of Clubs or Organizations: No    Attends Club or Organization Meetings: Never    Marital Status:     Intimate Partner Violence: Not on file   Housing Stability: Low Risk     Unable to Pay for Housing in the Last Year: No    Number of Places Lived in the Last Year: 1    Unstable Housing in the Last Year: No      Past Medical History:   Diagnosis Date    Anxiety     Cervical herniated disc     Circulation problem     COPD (chronic obstructive pulmonary disease) ([facility])     CRF (chronic renal failure), stage 3 (moderate) ([facility])     Depression     Depression     Diverticulosis of colon     Fibromyalgia     Hx of blood clots     DVT    Hyperlipidemia     Joint ache     Kidney failure     states begining stage 4 since summer '17    Lumbar disc disease     Lumbar disc disease     Migraine     Muscle spasm of back     Pleural effusion     Prolonged emergence from general anesthesia     SVT (supraventricular tachycardia) ([facility] [address] Thyroid disease     HYPOTHYROIDISM    Varicose vein      Past Surgical History:   Procedure Laterality Date    BACK INJECTION Bilateral 7/22/2022    BILATERAL SACROILIAC JOINT INJECTION performed by Fauzia Beth MD at [address]      right inguinal and UMBILICAL-2 SURGERIES    HYSTERECTOMY (CERVIX STATUS UNKNOWN)      HYSTERECTOMY, VAGINAL      PAIN MANAGEMENT PROCEDURE Left 6/12/2020    LUMBAR EPIDURAL STEROID INJECTION LEFT L4-L5 performed by Fauzia Beth MD at [address] Left 7/8/2022    LUMBAR EPIDURAL STEROID INJECTION   LEFT L4-5 performed by Fauzia Beth MD at [facility] 26 N/A 11/11/2020    CHOLECYSTECTOMY AND SIGMOID COLECTOMY performed by Luiza Gauthier MD at [address] [place] [address]         Review of Systems    OBJECTIVE:  BP (!) 152/82 (Site: Left Upper Arm, Position: Sitting, Cuff Size: Medium Adult)   Pulse 73   Temp 97.7 °F (36.5 °C) (Oral)   Resp 16   Wt 162 lb (73.5 kg)   LMP  (LMP Unknown)   SpO2 95%   BMI 26.15 kg/m²      Body mass index is 26.15 kg/m². Physical Exam    ASSESSMENT/PLAN:    Mckenna Mccallum was seen today for hypertension. Diagnoses and all orders for this visit:    Primary hypertension  -     losartan (COZAAR) 25 MG tablet; Take 1 tablet by mouth daily    Stage 3b chronic kidney disease ([address]  Comments:  has worsened and has order to recheck     Chronic nonintractable headache, unspecified headache type  -     amitriptyline (ELAVIL) 25 MG tablet; Take 1 tablet by mouth nightly    Chronic insomnia  -     amitriptyline (ELAVIL) 25 MG tablet;  Take 1 tablet by mouth nightly    Chronic pain syndrome  Comments:  chronic tramadol  reviewed oarrs  due to back and neck     Orders Placed This Encounter   Medications    amitriptyline (ELAVIL) 25 MG tablet     Sig: Take 1 tablet by mouth nightly     Dispense:  90 tablet     Refill:  1 losartan (COZAAR) 25 MG tablet     Sig: Take 1 tablet by mouth daily     Dispense:  30 tablet     Refill:  5          Return in about 3 months (around 1/11/2023), or if symptoms worsen or fail to improve, for medicare wellness . There are no Patient Instructions on file for this visit.

## 2022-10-15 NOTE — PROGRESS NOTES
SUBJECTIVE:  Clair Mata is a 71 y.o. female being evaluated for:    Chief Complaint   Patient presents with    Hypertension     X1 day BP running high 196/103, 183/96, 156/80       HPI   Today because blood pressure has been high  Screenings in the morning from the hospital program  NO  nose bleeds cp or dizziness  Chronic headaches     Allergies   Allergen Reactions    Ciprofloxacin Nausea And Vomiting     SEVERE    Nsaids      Kidney disease    Abilify [Aripiprazole] Itching    Benadryl [Diphenhydramine] Other (See Comments)     COMPLETTELY SEDATES HER-PER DAUGHTER    Darvon [Propoxyphene] Rash    Dilaudid [Hydromorphone Hcl] Itching     CAN TOLERATE 1 DOSE IF NOT GIVEN BACK TO BACK-    Hydrocodone-Acetaminophen Nausea And Vomiting     8/23/2020--pt states that only the hydrodocone-acetaminophen combination causes a reaction, but pt doesn't react to acetaminophen by Yao Cardoza RN    Morphine Itching and Nausea And Vomiting    Percocet [Oxycodone-Acetaminophen] Other (See Comments)     Makes her loopy,HALLUCINATIONS    Trintellix [Vortioxetine] Other (See Comments)     Hands/feet jittery, nausea     Current Outpatient Medications   Medication Sig Dispense Refill    Doxylamine Succinate, Sleep, (SLEEP AID PO) Take 2 tablets by mouth nightly      amitriptyline (ELAVIL) 25 MG tablet Take 1 tablet by mouth nightly 90 tablet 1    losartan (COZAAR) 25 MG tablet Take 1 tablet by mouth daily 30 tablet 5    traMADol (ULTRAM) 50 MG tablet Take 1 tablet by mouth 2 times daily as needed for Pain for up to 60 days.  60 tablet 1    gabapentin (NEURONTIN) 600 MG tablet TAKE 1 TABLET BY MOUTH THREE TIMES DAILY 270 tablet 0    baclofen (LIORESAL) 10 MG tablet TAKE 1 TABLET BY MOUTH THREE TIMES DAILY AS NEEDED FOR MUSCLE SPASM 90 tablet 2    methylfolate (DEPLIN) 7.5 MG TABS tablet Take 7.5 tablets by mouth daily      umeclidinium-vilanterol (ANORO ELLIPTA) 62.5-25 MCG/INH AEPB inhaler Inhale 1 puff into the lungs daily 1 each 5    vitamin D3 (CHOLECALCIFEROL) 25 MCG (1000 UT) TABS tablet Take 1 tablet by mouth daily 30 tablet 5    albuterol sulfate  (90 Base) MCG/ACT inhaler INHALE 2 PUFFS BY MOUTH EVERY 6 HOURS AS NEEDED FOR WHEEZING 20.1 g 0    lovastatin (MEVACOR) 40 MG tablet TAKE 1 TABLET BY MOUTH EVERY NIGHT 90 tablet 1    citalopram (CELEXA) 40 MG tablet TAKE 1 TABLET BY MOUTH DAILY 90 tablet 1    promethazine (PHENERGAN) 12.5 MG tablet Take 1 tablet by mouth every 8 hours as needed for Nausea TAKE 1 TABLET BY MOUTH EVERY DAY AS NEEDED 90 tablet 0    acetaminophen (TYLENOL) 500 MG tablet Take 500 mg by mouth every 6 hours as needed       albuterol (PROVENTIL) (2.5 MG/3ML) 0.083% nebulizer solution Take 3 mLs by nebulization every 6 hours as needed for Wheezing 120 each 0    dicyclomine (BENTYL) 10 MG capsule Take 1 capsule by mouth 4 times daily as needed (abdominal cramping and diarrhea) 120 capsule 1    levothyroxine (SYNTHROID) 25 MCG tablet TAKE 1 TABLET BY MOUTH DAILY 90 tablet 1     No current facility-administered medications for this visit. Social History     Socioeconomic History    Marital status:       Spouse name: Not on file    Number of children: 2    Years of education: Not on file    Highest education level: Not on file   Occupational History    Occupation: NA   Tobacco Use    Smoking status: Every Day     Packs/day: 0.33     Years: 45.00     Pack years: 14.85     Types: Cigarettes     Start date: 8/16/2014    Smokeless tobacco: Never    Tobacco comments:     would like to quit   Vaping Use    Vaping Use: Never used   Substance and Sexual Activity    Alcohol use: No     Alcohol/week: 0.0 standard drinks    Drug use: Never    Sexual activity: Never   Other Topics Concern    Not on file   Social History Narrative    Not on file     Social Determinants of Health     Financial Resource Strain: Low Risk     Difficulty of Paying Living Expenses: Not hard at all   Food Insecurity: Food Insecurity Present    Worried About [facility] in the Last Year: Sometimes true    Ran Out of Food in the Last Year: Sometimes true   Transportation Needs: No Transportation Needs    Lack of Transportation (Medical): No    Lack of Transportation (Non-Medical): No   Physical Activity: Insufficiently Active    Days of Exercise per Week: 2 days    Minutes of Exercise per Session: 40 min   Stress: No Stress Concern Present    Feeling of Stress : Not at all   Social Connections: Socially Isolated    Frequency of Communication with Friends and Family: Three times a week    Frequency of Social Gatherings with Friends and Family: Three times a week    Attends Orthodox Services: Never    Active Member of Clubs or Organizations: No    Attends Club or Organization Meetings: Never    Marital Status:     Intimate Partner Violence: Not on file   Housing Stability: Low Risk     Unable to Pay for Housing in the Last Year: No    Number of Places Lived in the Last Year: 1    Unstable Housing in the Last Year: No      Past Medical History:   Diagnosis Date    Anxiety     Cervical herniated disc     Circulation problem     COPD (chronic obstructive pulmonary disease) ([facility])     CRF (chronic renal failure), stage 3 (moderate) ([facility])     Depression     Depression     Diverticulosis of colon     Fibromyalgia     Hx of blood clots     DVT    Hyperlipidemia     Joint ache     Kidney failure     states begining stage 4 since summer '17    Lumbar disc disease     Lumbar disc disease     Migraine     Muscle spasm of back     Pleural effusion     Prolonged emergence from general anesthesia     SVT (supraventricular tachycardia) ([address]     Thyroid disease     HYPOTHYROIDISM    Varicose vein      Past Surgical History:   Procedure Laterality Date    BACK INJECTION Bilateral 7/22/2022    BILATERAL SACROILIAC JOINT INJECTION performed by Giuliana Sung MD at [address] inguinal and UMBILICAL-2 SURGERIES    HYSTERECTOMY (CERVIX STATUS UNKNOWN)      HYSTERECTOMY, VAGINAL      PAIN MANAGEMENT PROCEDURE Left 6/12/2020    LUMBAR EPIDURAL STEROID INJECTION LEFT L4-L5 performed by Lauren Lovell MD at [address] Left 7/8/2022    LUMBAR EPIDURAL STEROID INJECTION   LEFT L4-5 performed by Lauren Lovell MD at [facility] 26 N/A 11/11/2020    CHOLECYSTECTOMY AND SIGMOID COLECTOMY performed by Fabby Floyd MD at [address] [place] [address]         Review of Systems   Constitutional:  Negative for activity change, fatigue and unexpected weight change. HENT:  Negative for nosebleeds. Respiratory:  Negative for cough and shortness of breath. Cardiovascular:  Negative for chest pain, palpitations and leg swelling. Gastrointestinal:  Negative for abdominal pain, blood in stool, constipation, diarrhea, nausea and vomiting. Genitourinary:  Negative for dysuria. Musculoskeletal:  Positive for arthralgias (chronic back, right knee). Neurological:  Positive for headaches (chronic). Negative for dizziness, speech difficulty, weakness and light-headedness. Psychiatric/Behavioral:  Positive for dysphoric mood. Negative for sleep disturbance. OBJECTIVE:  BP (!) 152/82 (Site: Left Upper Arm, Position: Sitting, Cuff Size: Medium Adult)   Pulse 73   Temp 97.7 °F (36.5 °C) (Oral)   Resp 16   Wt 162 lb (73.5 kg)   LMP  (LMP Unknown)   SpO2 95%   BMI 26.15 kg/m²      Body mass index is 26.15 kg/m². Physical Exam  Vitals and nursing note reviewed. Constitutional:       General: She is not in acute distress. Appearance: Normal appearance. She is well-developed. HENT:      Head: Normocephalic and atraumatic. Eyes:      Conjunctiva/sclera: Conjunctivae normal.   Neck:      Thyroid: No thyromegaly. Vascular: No carotid bruit.       Comments: Thyroid nodule   Cardiovascular: Rate and Rhythm: Normal rate and regular rhythm. Heart sounds: Normal heart sounds. No murmur heard. No friction rub. No gallop. Pulmonary:      Effort: Pulmonary effort is normal.      Breath sounds: Normal breath sounds. Chest:      Chest wall: No tenderness. Abdominal:      General: Bowel sounds are normal. There is no distension. Palpations: Abdomen is soft. Tenderness: There is no abdominal tenderness. Comments: No hepatosplenomegaly   Musculoskeletal:         General: Tenderness (Low back and upper back ) present. No swelling. Lymphadenopathy:      Cervical: No cervical adenopathy. Skin:     General: Skin is warm and dry. Neurological:      General: No focal deficit present. Mental Status: She is alert. Motor: No abnormal muscle tone. Gait: Gait normal.   Psychiatric:         Behavior: Behavior normal.         Thought Content: Thought content normal.      Comments: Flat and depressed        ASSESSMENT/PLAN:    Victor Manuel Brooks was seen today for hypertension. Diagnoses and all orders for this visit:    Primary hypertension  -     losartan (COZAAR) 25 MG tablet; Take 1 tablet by mouth daily    Stage 3b chronic kidney disease ([facility] [address]  Comments:  has worsened and has order to recheck     Chronic nonintractable headache, unspecified headache type  -     amitriptyline (ELAVIL) 25 MG tablet; Take 1 tablet by mouth nightly    Chronic insomnia  -     amitriptyline (ELAVIL) 25 MG tablet;  Take 1 tablet by mouth nightly    Chronic pain syndrome  Comments:  chronic tramadol  reviewed oarrs  due to back and neck       Orders Placed This Encounter   Medications    amitriptyline (ELAVIL) 25 MG tablet     Sig: Take 1 tablet by mouth nightly     Dispense:  90 tablet     Refill:  1    losartan (COZAAR) 25 MG tablet     Sig: Take 1 tablet by mouth daily     Dispense:  30 tablet     Refill:  5        Return in about 3 months (around 1/11/2023), or if symptoms worsen or fail to improve, for medicare wellness . There are no Patient Instructions on file for this visit.

## 2022-10-17 ENCOUNTER — CARE COORDINATION (OUTPATIENT)
Dept: CASE MANAGEMENT | Age: 72
End: 2022-10-17

## 2022-10-18 ENCOUNTER — CARE COORDINATION (OUTPATIENT)
Dept: CASE MANAGEMENT | Age: 72
End: 2022-10-18

## 2022-10-19 ENCOUNTER — CARE COORDINATION (OUTPATIENT)
Dept: CASE MANAGEMENT | Age: 72
End: 2022-10-19

## 2022-10-19 NOTE — CARE COORDINATION
Remote Patient Monitoring Note      Date/Time:  10/19/2022 12:41 PM  LPN contacted patient by telephone regarding yellow alert received for blood pressure reading (172/89). Verified patients name and  as identifiers. Background: RPM for HTN, COPD Clinical Interventions: Reviewed and followed up on alerts and treatments-LPN reviewed yellow alert r/t pt's BP of 172/89. Writer contacted pt and pt denied symptoms and rechecked BP with new reading of 160/91. Plan/Follow Up: Will continue to review, monitor and address alerts with follow up based on severity of symptoms and risk factors.  Chris Lua LPN, [facility] PH: [phone] ---- Current Patient Metrics ---- Activity: - mins Blood Pressure: 160/91, 84bpm Pulseox: 96%, 74bpm Survey: - Weight: 160.0lbs Note Created at: 10/19/2022 12:43 PM ET ---- Time-Spent: 2 minutes 0 seconds

## 2022-10-20 ENCOUNTER — CARE COORDINATION (OUTPATIENT)
Dept: CASE MANAGEMENT | Age: 72
End: 2022-10-20

## 2022-10-20 NOTE — CARE COORDINATION
Recorded by: Loretta Rosas 10/20/2022 @ 12:13 PM   Time Spent: 2 minutes     Remote Patient Monitoring Note Date/Time: 10/20/2022 12:13 PM LPN reviewed patients reported daily Remote Patient Monitoring metrics. All reported metrics are within normal limits. Plan/Follow Up:  Will continue to review, monitor and address alerts with follow up based on severity of symptoms and risk factors ---- Current Patient Metrics ---- Activity: - mins Blood Pressure: 128/75, 98bpm Pulseox: 95%, 96bpm Survey: C Weight: 158.6lbs Note Created at: 10/20/2022 12:13 PM ET ---- Time-Spent: 2 minutes 0 seconds

## 2022-10-21 ENCOUNTER — CARE COORDINATION (OUTPATIENT)
Dept: CASE MANAGEMENT | Age: 72
End: 2022-10-21

## 2022-10-21 NOTE — CARE COORDINATION
Recorded by: Maia Eisenmenger 10/21/2022 @ 2:16 PM   Time Spent: 7 minutes     Remote Alert Monitoring Note Date/Time: 10/21/2022 2:12 PM LPN contacted patient by telephone regarding red alert received for weight increase (3 lbs in 1 day 161.6). Verified patients name and  as identifiers. Background: RPM monitored for HTN, COPD Refer to 911 immediately if:  Patient unresponsive or unable to provide history  Change in cognition or sudden confusion  Patient unable to respond in complete sentences  Intense chest pain/tightness  Any concern for any clinical emergency  Red Alert: Provider response time of 1 hr required for any red alert requiring intervention  Yellow Alert: Provider response time of 3hr required for any escalated yellow alert Weight Scale Triage Was your weight obtained upon rising/waking today? yes Was your weight obtained after voiding and/or use of the bathroom today> no Did you weigh yourself in the same amount of clothing today, compared to how you typically do> yes Was the scale bumped or moved prior to today's weight> no Is your scale on a flat/hard surface? yes Did you obtain your weight with shoes on> no  If yes, is this something you normally do during your daily weights> no Were you standing up straight on the scale today> yes  Were you leaning on anything while obtaining your weight today? no Clinical Interventions: Reviewed and followed up on alerts and treatments-Spoke with Alejandra Irvin she states she feels fine, weighed in same amount of clothing as she normally does, denies Chest pain,SOB, increased leg edema. States she feels fine and that her weight does that some days she has not changed a thing. Plan/Follow Up: Will continue to review, monitor and address alerts with follow up based on severity of symptoms and risk factors.  ---- Current Patient Metrics ---- Activity: - mins Blood Pressure: 109/70, 100bpm Pulseox: 96%, 96bpm Survey: - Weight: 161.6lbs Note Created at: 10/21/2022 02:16 PM HERB ---- Time-Spent: 7 minutes 0 seconds

## 2022-10-24 ENCOUNTER — CARE COORDINATION (OUTPATIENT)
Dept: CASE MANAGEMENT | Age: 72
End: 2022-10-24

## 2022-10-24 NOTE — CARE COORDINATION
Recorded by: Donney Felty 10/24/2022 @ 1:26 PM   Time Spent: 2 minutes     Remote Patient Monitoring Note Date/Time: 10/24/2022 1:26 PM LPN reviewed patients reported daily Remote Patient Monitoring metrics. All reported metrics are within normal limits. Plan/Follow Up:  Will continue to review, monitor and address alerts with follow up based on severity of symptoms and risk factors ---- Current Patient Metrics ---- Activity: - mins Blood Pressure: 115/66, 99bpm Pulseox: 98%, 97bpm Survey: - Weight: 160.8lbs Note Created at: 10/24/2022 01:26 PM ET ---- Time-Spent: 2 minutes 0 seconds

## 2022-10-25 ENCOUNTER — CARE COORDINATION (OUTPATIENT)
Dept: CARE COORDINATION | Age: 72
End: 2022-10-25

## 2022-10-25 ENCOUNTER — CARE COORDINATION (OUTPATIENT)
Dept: CASE MANAGEMENT | Age: 72
End: 2022-10-25

## 2022-10-25 DIAGNOSIS — G89.4 CHRONIC PAIN SYNDROME: ICD-10-CM

## 2022-10-25 DIAGNOSIS — M47.812 ARTHRITIS OF NECK: ICD-10-CM

## 2022-10-25 DIAGNOSIS — M51.9 LUMBAR DISC DISEASE: ICD-10-CM

## 2022-10-25 NOTE — CARE COORDINATION
Ambulatory Care Coordination Note  10/25/2022    ACC: Francy Rinne, RN    Spoke to patient  daughter Georgina(judy) for cc follow up. Patient has been doing great and likes the RPM. Patient can use it on her own. Patient does not test bs, last a1c 3/2021 was 5.4%. No c/o of cp, sob fatigue. No headache or vertigo. Per Varsha Dewey only concern patient has at this time is the sleep medication (Doxylamine Succinate) is not helping her sleep. Will route pcp to see what recommendations for patient. No other questions, concerns or assistance needed at this time. Will continue to outreach at later date and time. Plan:  Sleep medication changed? Is it helping? Bp, hr, spo2 trends, rpm feedback  Assistance,education or resources in need? Care gaps    Offered patient enrollment in the Remote Patient Monitoring (RPM) program for in-home monitoring: PT ALREADY IN RPM    Lab Results       None            Care Coordination Interventions    Referral from Primary Care Provider: No  Suggested Interventions and Community Resources  Fall Risk Prevention: Completed (Comment: 6/30/22 completed)  Meals on Wheels: Completed (Comment: 8/12/22 completed)  Smoking Cessation: Declined (Comment: 10/5/22)  Other Services: Completed (Comment: coa referral 6/30/22)  Zone Management Tools: Completed (Comment: copd completed 8/31/22)  Other Services or Interventions: 8/31/22  local food panitries RESOURCES          Goals Addressed                      This Visit's Progress      Medication Management   On track      I will take my medication as directed. I will notify my provider of any problems with medications, like adverse effects or side effects. I will notify my provider/Care Coordinator if I am unable to afford my medications. I will notify my provider for advice before I stop taking any of my medication.     Barriers: lack of education  Plan for overcoming my barriers: acm RN support.education  Confidence: 7/10  Anticipated Goal Completion Date 10/31/2022        Patient Stated (pt-stated)   On track      I will schedule an appointment with my pcp for htn management. I will monitor my bp daily and record it in log provided by my chart. Barriers: Remembering to chart write/ log it daily. Plan for overcoming my barriers: Set BP cuff next to morning medications to remind to take it daily. Confidence: 5/10  Anticipated Goal Completion Date:10/31/2022              Prior to Admission medications    Medication Sig Start Date End Date Taking?  Authorizing Provider   levothyroxine (SYNTHROID) 25 MCG tablet TAKE 1 TABLET BY MOUTH DAILY 10/13/22   BULMARO Marx NP   Doxylamine Succinate, Sleep, (SLEEP AID PO) Take 2 tablets by mouth nightly    Historical Provider, MD   amitriptyline (ELAVIL) 25 MG tablet Take 1 tablet by mouth nightly 10/11/22   Minoo Zhang MD   losartan (COZAAR) 25 MG tablet Take 1 tablet by mouth daily 10/11/22   Minoo Zhang MD   gabapentin (NEURONTIN) 600 MG tablet TAKE 1 TABLET BY MOUTH THREE TIMES DAILY 8/3/22 11/1/22  Minoo Zhang MD   baclofen (LIORESAL) 10 MG tablet TAKE 1 TABLET BY MOUTH THREE TIMES DAILY AS NEEDED FOR MUSCLE SPASM 7/6/22   Minoo Zhang MD   methylfolate (DEPLIN) 7.5 MG TABS tablet Take 7.5 tablets by mouth daily    Historical Provider, MD   umeclidinium-vilanterol [facility] ELLIPTA) 62.5-25 MCG/INH AEPB inhaler Inhale 1 puff into the lungs daily 5/23/22   Minoo Zhang MD   vitamin D3 (CHOLECALCIFEROL) 25 MCG (1000 UT) TABS tablet Take 1 tablet by mouth daily 5/23/22   Minoo Zhang MD   albuterol sulfate  (90 Base) MCG/ACT inhaler INHALE 2 PUFFS BY MOUTH EVERY 6 HOURS AS NEEDED FOR WHEEZING 5/23/22   BULMARO Marx NP   lovastatin (MEVACOR) 40 MG tablet TAKE 1 TABLET BY MOUTH EVERY NIGHT 5/12/22   Minoo Zhang MD   citalopram (CELEXA) 40 MG tablet TAKE 1 TABLET BY MOUTH DAILY 12/27/21   BULMARO Marx NP   promethazine (PHENERGAN) 12.5 MG tablet Take 1 tablet by mouth every 8 hours as needed for Nausea TAKE 1 TABLET BY MOUTH EVERY DAY AS NEEDED 10/28/21   BULMARO Kelley - NP   acetaminophen (TYLENOL) 500 MG tablet Take 500 mg by mouth every 6 hours as needed     Historical Provider, MD   albuterol (PROVENTIL) (2.5 MG/3ML) 0.083% nebulizer solution Take 3 mLs by nebulization every 6 hours as needed for Wheezing 9/1/20   Mario Tomas MD   dicyclomine (BENTYL) 10 MG capsule Take 1 capsule by mouth 4 times daily as needed (abdominal cramping and diarrhea) 12/5/19   Mario Tomas MD       No future appointments. ,   Diabetes Assessment    Medic Alert ID: No  Meal Planning: Avoidance of concentrated sweets   How often do you test your blood sugar?: No Testing (Comment: LAST A1C IN [facility] 2021 WAS 5.4%)   Do you have barriers with adherence to non-pharmacologic self-management interventions?  (Nutrition/Exercise/Self-Monitoring): No   Have you ever had to go to the ED for symptoms of low blood sugar?: No       No patient-reported symptoms        ,   COPD Assessment    Does the patient understand envrionmental exposure?: Yes  Is the patient able to verbalize Rescue vs. Long Acting medications?: Yes  Does the patient have a nebulizer?: Yes  Does the patient use a space with inhaled medications?: No     No patient-reported symptoms         Symptoms:          , and   General Assessment    Do you have any symptoms that are causing concern?: No

## 2022-10-25 NOTE — CARE COORDINATION
Recorded by: Yolanda Casanova 10/25/2022 @ 3:16 PM   Time Spent: 4 minutes      Remote Alert Monitoring Note Date/Time: 10/25/2022 1:47 PM LPN contacted family by telephone regarding yellow alert received for blood pressure reading (178/87). Verified patients name and  as identifiers. Background: RPM enrolled for HTN, COPD Refer to 911 immediately if:  Patient unresponsive or unable to provide history  Change in cognition or sudden confusion  Patient unable to respond in complete sentences  Intense chest pain/tightness  Any concern for any clinical emergency  Red Alert: Provider response time of 1 hr required for any red alert requiring intervention  Yellow Alert: Provider response time of 3hr required for any escalated yellow alert BP Triage Are you having any Chest Pain? no Are you having any Shortness of Breath> no Do you have a headache or have any vision changes? no Are you having any numbness or tingling? no Are you having any other health concerns or issues> no Clinical Interventions: Reviewed and followed up on alerts and treatments-Spoke with Daughter Dionisio Bruce she states mom mentioned BP reading was high, she denies chest pain tingling, Headache, nausea. SOB, dizziness, feels fine, advised to have patient retake BP no concerns noted Plan/Follow Up: Will continue to review, monitor and address alerts with follow up based on severity of symptoms and risk factors.  UPDATE: 1515 BP was retaken and is now WNL ---- Current Patient Metrics ---- Activity: - mins Blood Pressure: 178/87, 78bpm Pulseox: 95%, 73bpm Survey: - Weight: 161.4lbs Note Created at: 10/25/2022 01:50 PM ET ---- Time-Spent: 4 minutes 0 seconds

## 2022-10-26 ENCOUNTER — CARE COORDINATION (OUTPATIENT)
Dept: CASE MANAGEMENT | Age: 72
End: 2022-10-26

## 2022-10-26 ENCOUNTER — TELEPHONE (OUTPATIENT)
Dept: FAMILY MEDICINE CLINIC | Age: 72
End: 2022-10-26

## 2022-10-26 DIAGNOSIS — G47.00 INSOMNIA, UNSPECIFIED TYPE: Primary | ICD-10-CM

## 2022-10-26 RX ORDER — TRAMADOL HYDROCHLORIDE 50 MG/1
50 TABLET ORAL 2 TIMES DAILY PRN
Qty: 60 TABLET | Refills: 2 | Status: SHIPPED | OUTPATIENT
Start: 2022-10-26 | End: 2023-01-24

## 2022-10-26 RX ORDER — SUVOREXANT 10 MG/1
10 TABLET, FILM COATED ORAL NIGHTLY
Qty: 30 TABLET | Refills: 0 | Status: SHIPPED | OUTPATIENT
Start: 2022-10-26 | End: 2022-11-23

## 2022-10-26 NOTE — CARE COORDINATION
Recorded by: Loretta Rosas 10/26/2022 @ 1:35 PM   Time Spent: 2 minutes     Remote Patient Monitoring Note Date/Time: 10/26/2022 1:35 PM LPN reviewed patients reported daily Remote Patient Monitoring metrics. All reported metrics are within normal limits. Plan/Follow Up:  Will continue to review, monitor and address alerts with follow up based on severity of symptoms and risk factors ---- Current Patient Metrics ---- Activity: - mins Blood Pressure: 132/79, 89bpm Pulseox: 96%, 84bpm Survey: - Weight: 162.6lbs Note Created at: 10/26/2022 01:35 PM ET ---- Time-Spent: 2 minutes 0 seconds

## 2022-10-26 NOTE — TELEPHONE ENCOUNTER
Call from care coordinator about sleeping medications  I really am unable to do another controlled substance  The antihistamine she is taking is not helping   We could t try belsomra but expensive It works differently   Takes several weeks as it turns off the wake cycle and does not make you feel sleepy  Will start with lower dose

## 2022-10-26 NOTE — TELEPHONE ENCOUNTER
I agree with the Care Coordinator's Plan of Care.   Concerned with her tramadol and other meds  Will try belsomra  Remote hx of benzo but feel we should avoid  But what she wants

## 2022-10-26 NOTE — TELEPHONE ENCOUNTER
Spoke with the patients daughter Georgina(on hippa)informed her  that Dr Keshawn Sawyer is really unable to do another controlled substance . We could try belsomra but is expensive It works differently, takes several weeks as it turns off the wake cycle and does not make you feel sleepy. Will start with lower dose   Paulamarianela Thorpe stated her mother stopped the amitriptyline. Pt does take Benadryl at night. Pt's daughter will call if cost is an issue.

## 2022-10-27 ENCOUNTER — CARE COORDINATION (OUTPATIENT)
Dept: CASE MANAGEMENT | Age: 72
End: 2022-10-27

## 2022-10-28 ENCOUNTER — CARE COORDINATION (OUTPATIENT)
Dept: CASE MANAGEMENT | Age: 72
End: 2022-10-28

## 2022-10-28 NOTE — CARE COORDINATION
Recorded by: Heather Sadler 10/28/2022 @ 2:02 PM   Time Spent: 8 minutes      Remote Alert Monitoring Note Date/Time: 10/28/2022 1:39 PM LPN contacted patient by telephone regarding red alert received for blood pressure reading (189/9). Verified patients name and  as identifiers. Background: enrolled in [facility] for HTN, COPD Refer to 911 immediately if:  Patient unresponsive or unable to provide history  Change in cognition or sudden confusion  Patient unable to respond in complete sentences  Intense chest pain/tightness  Any concern for any clinical emergency  Red Alert: Provider response time of 1 hr required for any red alert requiring intervention  Yellow Alert: Provider response time of 3hr required for any escalated yellow alert BP Triage Are you having any Chest Pain? no Are you having any Shortness of Breath> no Do you have a headache or have any vision changes? no Are you having any numbness or tingling? no Are you having any other health concerns or issues> no Clinical Interventions: Reviewed and followed up on alerts and treatments-Spoke with Cipriano Healy she states she feels fine, denies chest pain, SOB, dizziness, limb tingling is not having any other pain at present time she has taken her medications she will retake BP and will acelerate to PCP if needs arise Plan/Follow Up: Will continue to review, monitor and address alerts with follow up based on severity of symptoms and risk factors. UPDATE patient retook BP and is WNL no action needed at this time.  ---- Current Patient Metrics ---- Activity: - mins Blood Pressure: 189/95, 82bpm Pulseox: 96%, 78bpm Survey: - Weight: 161.6lbs Note Created at: 10/28/2022 01:42 PM ET ---- Time-Spent: 8 minutes 0 seconds

## 2022-10-31 ENCOUNTER — CARE COORDINATION (OUTPATIENT)
Dept: CASE MANAGEMENT | Age: 72
End: 2022-10-31

## 2022-10-31 NOTE — CARE COORDINATION
Recorded by: Harry Anton 10/31/2022 @ 1:22 PM   Time Spent: 2 minutes     Remote Patient Monitoring Note Date/Time: 10/31/2022 1:22 PM LPN reviewed patients reported daily Remote Patient Monitoring metrics. All reported metrics are within normal limits. Plan/Follow Up:  Will continue to review, monitor and address alerts with follow up based on severity of symptoms and risk factors ---- Current Patient Metrics ---- Activity: - mins Blood Pressure: 125/71, 93bpm Pulseox: 97%, 90bpm Survey: - Weight: 159.4lbs Note Created at: 10/31/2022 01:22 PM ET ---- Time-Spent: 2 minutes 0 seconds

## 2022-11-01 ENCOUNTER — CARE COORDINATION (OUTPATIENT)
Dept: CASE MANAGEMENT | Age: 72
End: 2022-11-01

## 2022-11-01 NOTE — CARE COORDINATION
Recorded by: Carma Hashimoto 11/01/2022 @ 2:20 PM   Time Spent: 2 minutes     Remote Patient Monitoring Note Date/Time: 11/1/2022 2:19 PM LPN reviewed patients reported daily Remote Patient Monitoring metrics. All reported metrics are within normal limits. Plan/Follow Up:  Will continue to review, monitor and address alerts with follow up based on severity of symptoms and risk factors ---- Current Patient Metrics ---- Activity: - mins Blood Pressure: 126/72, 96bpm Pulseox: 95%, 95bpm Survey: - Weight: 159.6lbs Note Created at: 11/01/2022 02:20 PM ET ---- Time-Spent: 2 minutes 0 seconds

## 2022-11-02 ENCOUNTER — CARE COORDINATION (OUTPATIENT)
Dept: CASE MANAGEMENT | Age: 72
End: 2022-11-02

## 2022-11-02 DIAGNOSIS — F33.0 MILD EPISODE OF RECURRENT MAJOR DEPRESSIVE DISORDER (HCC): ICD-10-CM

## 2022-11-02 RX ORDER — CITALOPRAM 40 MG/1
TABLET ORAL
Qty: 90 TABLET | Refills: 1 | Status: SHIPPED | OUTPATIENT
Start: 2022-11-02

## 2022-11-02 RX ORDER — GABAPENTIN 600 MG/1
TABLET ORAL
Qty: 270 TABLET | Refills: 0 | Status: SHIPPED | OUTPATIENT
Start: 2022-11-02 | End: 2023-01-31

## 2022-11-02 NOTE — CARE COORDINATION
Recorded by: Jackie Tyler 11/02/2022 @ 1:42 PM   Time Spent: 2 minutes     Remote Patient Monitoring Note Date/Time: 11/2/2022 1:42 PM LPN reviewed patients reported daily Remote Patient Monitoring metrics. All reported metrics are within normal limits. Plan/Follow Up:  Will continue to review, monitor and address alerts with follow up based on severity of symptoms and risk factors ---- Current Patient Metrics ---- Activity: - mins Blood Pressure: 137/74, 83bpm Pulseox: 94%, 77bpm Survey: - Weight: 159.8lbs Note Created at: 11/02/2022 01:42 PM ET ---- Time-Spent: 2 minutes 0 seconds

## 2022-11-03 ENCOUNTER — CARE COORDINATION (OUTPATIENT)
Dept: CARE COORDINATION | Age: 72
End: 2022-11-03

## 2022-11-03 NOTE — CARE COORDINATION
Remote Patient Monitoring Note      Date/Time:  11/3/2022 12:51 PM    CCSS reviewed patients reported daily Remote Patient Monitoring metrics. All reported metrics are within normal limits. Plan/Follow Up:  Will continue to review, monitor and address alerts with follow up based on severity of symptoms and risk factors ---- Current Patient Metrics ---- Activity: - mins Blood Pressure: 125/83, 89bpm Pulseox: 95%, 84bpm Survey: C Weight: 160.4lbs Note Created at: 11/03/2022 12:51 PM ET ---- Time-Spent: 2 minutes 0 seconds

## 2022-11-04 ENCOUNTER — CARE COORDINATION (OUTPATIENT)
Dept: CARE COORDINATION | Age: 72
End: 2022-11-04

## 2022-11-04 NOTE — CARE COORDINATION
Remote Patient Monitoring Note      Date/Time:  11/4/2022 2:47 PM    CCSS reviewed patients reported daily Remote Patient Monitoring metrics. All reported metrics are within normal limits. Plan/Follow Up: Will continue to review, monitor and address alerts with follow up based on severity of symptoms and risk factors ---- Current Patient Metrics ---- Activity: - mins Blood Pressure: 122/67, 102bpm Pulseox: 96%, 94bpm Survey: - Weight: 162.2lbs Note Created at: 11/04/2022 02:48 PM ET ---- Time-Spent: 2 minutes 0 seconds  .

## 2022-11-07 ENCOUNTER — CARE COORDINATION (OUTPATIENT)
Dept: CASE MANAGEMENT | Age: 72
End: 2022-11-07

## 2022-11-07 NOTE — CARE COORDINATION
Recorded by: Lisseth Natarajan 11/07/2022 @ 2:24 PM   Time Spent: 2 minutes     Remote Patient Monitoring Note Date/Time: 11/7/2022 2:24 PM LPN reviewed patients reported daily Remote Patient Monitoring metrics. All reported metrics are within normal limits. Plan/Follow Up:  Will continue to review, monitor and address alerts with follow up based on severity of symptoms and risk factors ---- Current Patient Metrics ---- Activity: - mins Blood Pressure: 160/72, 76bpm Pulseox: 95%, 73bpm Survey: - Weight: 162.0lbs Note Created at: 11/07/2022 02:24 PM ET ---- Time-Spent: 2 minutes 0 seconds

## 2022-11-08 ENCOUNTER — CARE COORDINATION (OUTPATIENT)
Dept: CASE MANAGEMENT | Age: 72
End: 2022-11-08

## 2022-11-08 NOTE — CARE COORDINATION
Recorded by: Andrea Abebe 11/08/2022 @ 1:55 PM   Time Spent: 2 minutes     Remote Patient Monitoring Note Date/Time: 11/8/2022 1:55 PM LPN reviewed patients reported daily Remote Patient Monitoring metrics. All reported metrics are within alert parameters. Plan/Follow Up:  Will continue to review, monitor and address alerts with follow up based on severity of symptoms and risk factors ---- Current Patient Metrics ---- Activity: - mins Blood Pressure: 151/81, 83bpm Pulseox: 97%, 79bpm Survey: - Weight: 162.0lbs Note Created at: 11/08/2022 01:55 PM ET ---- Time-Spent: 2 minutes 0 seconds

## 2022-11-09 ENCOUNTER — CARE COORDINATION (OUTPATIENT)
Dept: CASE MANAGEMENT | Age: 72
End: 2022-11-09

## 2022-11-09 NOTE — CARE COORDINATION
Recorded by: Sanjeev Mendoza 11/09/2022 @ 2:28 PM   Time Spent: 2 minutes     Remote Patient Monitoring Note Date/Time: 11/9/2022 2:28 PM LPN reviewed patients reported daily Remote Patient Monitoring metrics. All reported metrics are within alert parameters. Plan/Follow Up:  Will continue to review, monitor and address alerts with follow up based on severity of symptoms and risk factors ---- Current Patient Metrics ---- Activity: - mins Blood Pressure: 157/74, 78bpm Pulseox: 97%, 74bpm Survey: - Weight: 161.6lbs Note Created at: 11/09/2022 02:28 PM ET ---- Time-Spent: 2 minutes 0 seconds

## 2022-11-10 ENCOUNTER — CARE COORDINATION (OUTPATIENT)
Dept: CARE COORDINATION | Age: 72
End: 2022-11-10

## 2022-11-10 NOTE — CARE COORDINATION
Remote Patient Monitoring Note      Date/Time:  11/10/2022 2:13 PM    CCSS reviewed patients reported daily Remote Patient Monitoring metrics. All reported metrics are within alert parameters. Plan/Follow Up:  Will continue to review, monitor and address alerts with follow up based on severity of symptoms and risk factors ---- Current Patient Metrics ---- Activity: - mins Blood Pressure: 128/97, 84bpm Pulseox: 96%, 82bpm Survey: C Weight: 160.0lbs Note Created at: 11/10/2022 02:14 PM ET ---- Time-Spent: 2 minutes 0 seconds

## 2022-11-11 ENCOUNTER — CARE COORDINATION (OUTPATIENT)
Dept: CARE COORDINATION | Age: 72
End: 2022-11-11

## 2022-11-11 NOTE — CARE COORDINATION
Remote Patient Monitoring Note      Date/Time:  11/11/2022 2:03 PM    CCSS reviewed patients reported daily Remote Patient Monitoring metrics. All reported metrics are within alert parameters. Plan/Follow Up:  Will continue to review, monitor and address alerts with follow up based on severity of symptoms and risk factors ---- Current Patient Metrics ---- Activity: - mins Blood Pressure: 144/83, 89bpm Pulseox: 96%, 86bpm Survey: - Weight: 162.0lbs Note Created at: 11/11/2022 02:03 PM ET ---- Time-Spent: 2 minutes 0 seconds

## 2022-11-14 ENCOUNTER — CARE COORDINATION (OUTPATIENT)
Dept: CARE COORDINATION | Age: 72
End: 2022-11-14

## 2022-11-14 RX ORDER — LOVASTATIN 40 MG/1
TABLET ORAL
Qty: 90 TABLET | Refills: 1 | Status: SHIPPED | OUTPATIENT
Start: 2022-11-14

## 2022-11-14 NOTE — CARE COORDINATION
Remote Patient Monitoring Note      Date/Time:  11/14/2022 2:53 PM    CCSS reviewed patients reported daily Remote Patient Monitoring metrics. All reported metrics are within alert parameters. Plan/Follow Up:  Will continue to review, monitor and address alerts with follow up based on severity of symptoms and risk factors ---- Current Patient Metrics ---- Activity: - mins Blood Pressure: 148/75, 96bpm Pulseox: 96%, 89bpm Survey: - Weight: 162.8lbs Note Created at: 11/14/2022 02:54 PM ET ---- Time-Spent: 2 minutes 0 seconds

## 2022-11-15 ENCOUNTER — CARE COORDINATION (OUTPATIENT)
Dept: CARE COORDINATION | Age: 72
End: 2022-11-15

## 2022-11-15 NOTE — CARE COORDINATION
Remote Alert Monitoring Note            Recorded by: Pranay Asencio 11/15/2022 @ 2:43 PM   Time Spent: 10 minutes     Date/Time: 11/15/2022 2:36 PM LPN contacted patient by telephone regarding red alert received for blood pressure reading (184/85). Verified patients name and  as identifiers. Background: PT enrolled for COPD and HTN Refer to 911 immediately if:  Patient unresponsive or unable to provide history  Change in cognition or sudden confusion  Patient unable to respond in complete sentences  Intense chest pain/tightness  Any concern for any clinical emergency  Red Alert: Provider response time of 1 hr required for any red alert requiring intervention  Yellow Alert: Provider response time of 3hr required for any escalated yellow alert BP Triage Are you having any Chest Pain> no Are you having any Shortness of Breath? no Do you have a headache or have any vision changes> no Are you having any numbness or tingling> no Are you having any other health concerns or issues? no Clinical Interventions: Reviewed and followed up on alerts and treatments-spoke with pt, she reports she is feeling fine and took her medications around 10am this morning. She states she will recheck her BP this afternoon, of note, it appears most of her BP readings are WNL. She denies CP/headache/dizziness/edema. Will monitor for 2nd reading Plan/Follow Up: Will continue to review, monitor and address alerts with follow up based on severity of symptoms and risk factors.  ---- Current Patient Metrics ---- Activity: - mins Blood Pressure: 184/85, 80bpm Pulseox: 96%, 79bpm Survey: - Weight: 161.4lbs Note Created at: 11/15/2022 02:43 PM ET ---- Time-Spent: 10 minutes 0 seconds

## 2022-11-15 NOTE — CARE COORDINATION
Remote Patient Monitoring Note      Date/Time:  11/15/2022 3:23 PM    CCSS reviewed patients reported daily Remote Patient Monitoring metrics. All reported metrics are within alert parameters. Plan/Follow Up:  Will continue to review, monitor and address alerts with follow up based on severity of symptoms and risk factors ---- Current Patient Metrics ---- Activity: - mins Blood Pressure: 160/66, 82bpm Pulseox: 96%, 79bpm Survey: - Weight: 161.4lbs Note Created at: 11/15/2022 03:23 PM ET ---- Time-Spent: 2 minutes 0 seconds

## 2022-11-16 ENCOUNTER — CARE COORDINATION (OUTPATIENT)
Dept: CASE MANAGEMENT | Age: 72
End: 2022-11-16

## 2022-11-16 NOTE — CARE COORDINATION
Recorded by: Jackie Tyler 11/16/2022 @ 1:57 PM   Time Spent: 2 minutes     Remote Patient Monitoring Note Date/Time: 11/16/2022 1:57 PM LPN reviewed patients reported daily Remote Patient Monitoring metrics. All reported metrics are within alert parameters. Plan/Follow Up:  Will continue to review, monitor and address alerts with follow up based on severity of symptoms and risk factors ---- Current Patient Metrics ---- Activity: - mins Blood Pressure: 152/76, 80bpm Pulseox: 98%, 74bpm Survey: - Weight: 161.4lbs Note Created at: 11/16/2022 01:57 PM ET ---- Time-Spent: 2 minutes 0 seconds

## 2022-11-17 ENCOUNTER — CARE COORDINATION (OUTPATIENT)
Dept: CARE COORDINATION | Age: 72
End: 2022-11-17

## 2022-11-17 NOTE — CARE COORDINATION
Remote Patient Monitoring Note      Date/Time:  11/17/2022 2:03 PM    CCSS reviewed patients reported daily Remote Patient Monitoring metrics. All reported metrics are within alert parameters. Plan/Follow Up:  Will continue to review, monitor and address alerts with follow up based on severity of symptoms and risk factors ---- Current Patient Metrics ---- Activity: - mins Blood Pressure: 151/73, 90bpm Pulseox: 94%, 88bpm Survey: C Weight: 161.8lbs Note Created at: 11/17/2022 02:03 PM ET ---- Time-Spent: 2 minutes 0 seconds

## 2022-11-18 ENCOUNTER — CARE COORDINATION (OUTPATIENT)
Dept: CASE MANAGEMENT | Age: 72
End: 2022-11-18

## 2022-11-18 NOTE — CARE COORDINATION
Recorded by: John Morrow 2022 @ 2:02 PM   Time Spent: 7 minutes     Remote Alert Monitoring Note Date/Time: 2022 2:00 PM LPN contacted patient by telephone regarding yellow alert received for blood pressure reading (172/77). Verified patients name and  as identifiers. Background: HTN, COPD Refer to 911 immediately if:  Patient unresponsive or unable to provide history  Change in cognition or sudden confusion  Patient unable to respond in complete sentences  Intense chest pain/tightness  Any concern for any clinical emergency  Red Alert: Provider response time of 1 hr required for any red alert requiring intervention  Yellow Alert: Provider response time of 3hr required for any escalated yellow alert BP Triage Are you having any Chest Pain? no Are you having any Shortness of Breath> no Do you have a headache or have any vision changes? no Are you having any numbness or tingling? no Are you having any other health concerns or issues> Back pain Clinical Interventions: Reviewed and followed up on alerts and treatments-Called and spoke to daughter Deborah Minor patient is doing okay denies chest pain, SOB, dizziness, tingling she is having some lower back pain the last few days, daughter will monitor over the weekend and if BP still high we will contact PCP Plan/Follow Up: Will continue to review, monitor and address alerts with follow up based on severity of symptoms and risk factors.  ---- Current Patient Metrics ---- Activity: - mins Blood Pressure: 172/77, 86bpm Pulseox: 95%, 80bpm Survey: - Weight: 161.0lbs Note Created at: 2022 02:02 PM ET ---- Time-Spent: 7 minutes 0 seconds

## 2022-11-21 ENCOUNTER — CARE COORDINATION (OUTPATIENT)
Dept: CASE MANAGEMENT | Age: 72
End: 2022-11-21

## 2022-11-21 DIAGNOSIS — G47.00 INSOMNIA, UNSPECIFIED TYPE: ICD-10-CM

## 2022-11-21 NOTE — CARE COORDINATION
Recorded by: Seth Mar 11/21/2022 @ 12:57 PM   Time Spent: 2 minutes     Remote Patient Monitoring Note Date/Time: 11/21/2022 12:57 PM LPN reviewed patients reported daily Remote Patient Monitoring metrics. All reported metrics are within alert parameters. Plan/Follow Up:  Will continue to review, monitor and address alerts with follow up based on severity of symptoms and risk factors ---- Current Patient Metrics ---- Activity: - mins Blood Pressure: 144/78, 92bpm Pulseox: 94%, 81bpm Survey: - Weight: 160.6lbs Note Created at: 11/21/2022 12:57 PM ET ---- Time-Spent: 2 minutes 0 seconds

## 2022-11-22 ENCOUNTER — CARE COORDINATION (OUTPATIENT)
Dept: CARE COORDINATION | Age: 72
End: 2022-11-22

## 2022-11-22 ENCOUNTER — CARE COORDINATION (OUTPATIENT)
Dept: CASE MANAGEMENT | Age: 72
End: 2022-11-22

## 2022-11-22 NOTE — CARE COORDINATION
Recorded by: Vane Kohli 11/22/2022 @ 1:48 PM   Time Spent: 3 minutes     Remote Patient Monitoring Note Date/Time: 11/22/2022 1:47 PM LPN contacted patient by telephone regarding yellow alert received for blood pressure reading (171/77). Background: HTN COPD Clinical Interventions: Called to speak to Varsha Dewey about Moms BP reading today voicemail is full unable to leave message Plan/Follow Up: Will continue to review, monitor and address alerts with follow up based on severity of symptoms and risk factors.  ---- Current Patient Metrics ---- Activity: - mins Blood Pressure: 171/77, 83bpm Pulseox: 97%, 77bpm Survey: - Weight: 160.6lbs Note Created at: 11/22/2022 01:48 PM ET ---- Time-Spent: 3 minutes 0 seconds

## 2022-11-23 ENCOUNTER — CARE COORDINATION (OUTPATIENT)
Dept: CASE MANAGEMENT | Age: 72
End: 2022-11-23

## 2022-11-23 RX ORDER — SUVOREXANT 10 MG/1
10 TABLET, FILM COATED ORAL NIGHTLY
Qty: 30 TABLET | Refills: 2 | Status: SHIPPED | OUTPATIENT
Start: 2022-11-24 | End: 2023-02-22

## 2022-11-23 NOTE — CARE COORDINATION
Anesthesia Pre Eval Note    Anesthesia ROS/Med Hx    Overall Review:  EKG was reviewed     Anesthetic Complication History:  Patient does not have a history of anesthetic complications      Pulmonary Review:    The patient is a former smoker.  (quit 2008)    Neuro/Psych Review:    Positive for headaches  Positive for psychiatric history - Depression    Cardiovascular Review:  Comments:   EKG 2019  Sinus tachycardia   Otherwise normal ECG When compared with ECG of   10-SEP-2019 10:40,   Minimal criteria for Inferior infarct are no longer present. T wave inversion no longer evident in   Inferior leads     Positive for hypertension    GI/HEPATIC/RENAL Review:  Comments: Hx severe UC, has ileostomy  Positive for GERD    End/Other Review:    Positive for obesity class I - 30.00 - 34.99  Positive for anemia  Positive for arthritis  Positive for chronic pain      Relevant Problems   No relevant active problems       Physical Exam     Airway   Mallampati: II  TM Distance: >3 FB  Neck ROM: Full    Cardiovascular  Cardiovascular exam normal    Dental Exam  Dental exam normal    Pulmonary Exam  Pulmonary exam normal    Abdominal Exam  Abdominal exam normal      Anesthesia Plan:    ASA Status: 3  Anesthesia Type: MAC    Induction: Intravenous  Preferred Airway Type: MaskPatient does not have a difficult airway or is not at risk of aspiration.   Maintenance: TIVA  Premedication: None  Patient does not have an implantable electronic device requiring post procedure programming.     Post-op Pain Management: Per Surgeon  Postoperative analgesia plan does NOT include opiods    Checklist  Reviewed: Lab Results, Past Med History, NPO Status, Patient Summary, Allergies, Beta Blocker Status, Medications, Problem list and Nursing Notes  Consent/Risks Discussed Statement:  The proposed anesthetic plan, including its risks and benefits, have been discussed with the Patient along with the risks and benefits of alternatives. Questions were  Recorded by: Ti Messina 2022 @ 11:36 AM   Time Spent: 6 minutes     Remote Patient Monitoring Note Date/Time: 2022 11:33 AM LPN contacted family by telephone regarding red alert received for blood pressure reading (). Verified patients name and  as identifiers. Background: HTN. COPD Clinical Interventions: Reviewed and followed up on alerts and treatments-spoke to daughter Caroline Holding she states mom is tired today and has a headache denies chest pain, tingling blurred vision. Her mom had not taken medication she just did and will recheck BP in 1 hour if it is still high LPN will route to PCP Plan/Follow Up: Will continue to review, monitor and address alerts with follow up based on severity of symptoms and risk factors.  ---- Current Patient Metrics ---- Activity: - mins Blood Pressure: 193/83, 67bpm Pulseox: 98%, 66bpm Survey: - Weight: 160.6lbs Note Created at: 2022 11:36 AM ET ---- Time-Spent: 6 minutes 0 seconds encouraged and answered and the patient and/or representative understands and agrees to proceed.        I discussed with the patient (and/or patient's legal representative) the risks and benefits of the proposed anesthesia plan, MAC, which may include services performed by other anesthesia providers.    Alternative anesthesia plans, if available, were reviewed with the patient (and/or patient's legal representative). Discussion has been held with the patient (and/or patient's legal representative) regarding risks of anesthesia, which include emergent situations that may require change in anesthesia plan.  The patient (and/or patient's legal representative) has indicated understanding, his/her questions have been answered, and he/she wishes to proceed with the planned anesthetic.    Informed Consent for Blood: Consented  Blood Products: Not Anticipated

## 2022-11-25 ENCOUNTER — CARE COORDINATION (OUTPATIENT)
Dept: CASE MANAGEMENT | Age: 72
End: 2022-11-25

## 2022-11-25 NOTE — CARE COORDINATION
Recorded by: Loretta Rosas 2022 @ 4:16 PM   Time Spent: 3 minutes      Remote Patient Monitoring Note Date/Time: 2022 1:19 PM LPN contacted family by telephone regarding yellow alert received for blood pressure reading (171/90). Verified patients name and  as identifiers. Background: HTN, COPD Clinical Interventions: LPN placed call to patients daughter for yellow alert in RPM for increased BP mailbox is full will attempt to reach out at later time. Plan/Follow Up: Will continue to review, monitor and address alerts with follow up based on severity of symptoms and risk factors.  UPDATE PATIENT DID NOT RETURN CALL ---- Current Patient Metrics ---- Activity: - mins Blood Pressure: 171/90, 90bpm Pulseox: 96%, 87bpm Survey: - Weight: 159.4lbs Note Created at: 2022 01:21 PM ET ---- Time-Spent: 3 minutes 0 seconds

## 2022-11-28 ENCOUNTER — CARE COORDINATION (OUTPATIENT)
Dept: CASE MANAGEMENT | Age: 72
End: 2022-11-28

## 2022-11-28 RX ORDER — MELATONIN
1000 DAILY
Qty: 30 TABLET | Refills: 5 | Status: SHIPPED | OUTPATIENT
Start: 2022-11-28

## 2022-11-28 NOTE — CARE COORDINATION
Remote Patient Monitoring Note      Date/Time:  11/28/2022 3:21 PM    LPN reviewed patients reported daily Remote Patient Monitoring metrics. All reported metrics are within alert parameters. Plan/Follow Up: Will continue to review, monitor and address alerts with follow up based on severity of symptoms and risk factors   Current Patient Metrics ---- Activity: - mins Blood Pressure: 168/67, 75bpm Pulseox: 95%, 72bpm Survey: - Weight: 160.0lbs Note Created at: 11/28/2022 03:21 PM ET ---- Time-Spent: 3 minutes 0 seconds.     Julienne Ortiz LPN    [phone]  [facility] Coordinator

## 2022-11-29 ENCOUNTER — CARE COORDINATION (OUTPATIENT)
Dept: CASE MANAGEMENT | Age: 72
End: 2022-11-29

## 2022-11-29 NOTE — CARE COORDINATION
Recorded by: Loretta Rosas 11/29/2022 @ 2:52 PM   Time Spent: 2 minutes     Remote Patient Monitoring Note Date/Time: 11/29/2022 2:52 PM LPN reviewed patients reported daily Remote Patient Monitoring metrics. All reported metrics are within alert parameters. Plan/Follow Up:  Will continue to review, monitor and address alerts with follow up based on severity of symptoms and risk factors ---- Current Patient Metrics ---- Activity: - mins Blood Pressure: 124/69, 97bpm Pulseox: 97%, 92bpm Survey: - Weight: 159.6lbs Note Created at: 11/29/2022 02:52 PM ET ---- Time-Spent: 2 minutes 0 seconds

## 2022-11-30 ENCOUNTER — CARE COORDINATION (OUTPATIENT)
Dept: CASE MANAGEMENT | Age: 72
End: 2022-11-30

## 2022-11-30 NOTE — CARE COORDINATION
Recorded by: John Morrow 11/30/2022 @ 2:11 PM   Time Spent: 2 minutes     Remote Patient Monitoring Note Date/Time: 11/30/2022 2:10 PM LPN reviewed patients reported daily Remote Patient Monitoring metrics. All reported metrics are within alert parameters. Plan/Follow Up:  Will continue to review, monitor and address alerts with follow up based on severity of symptoms and risk factors ---- Current Patient Metrics ---- Activity: - mins Blood Pressure: 108/69, 101bpm Pulseox: 95%, 90bpm Survey: - Weight: 160.8lbs Note Created at: 11/30/2022 02:11 PM ET ---- Time-Spent: 2 minutes 0 seconds

## 2022-12-01 ENCOUNTER — CARE COORDINATION (OUTPATIENT)
Dept: CASE MANAGEMENT | Age: 72
End: 2022-12-01

## 2022-12-01 NOTE — CARE COORDINATION
Recorded by: Olamide Branch 12/01/2022 @ 1:42 PM   Time Spent: 2 minutes     Remote Patient Monitoring Note Date/Time: 12/1/2022 1:41 PM LPN reviewed patients reported daily Remote Patient Monitoring metrics. All reported metrics are within alert parameters. Plan/Follow Up:  Will continue to review, monitor and address alerts with follow up based on severity of symptoms and risk factors ---- Current Patient Metrics ---- Activity: - mins Blood Pressure: 96/60, 96bpm Pulseox: 96%, 92bpm Survey: C Weight: 159.2lbs Note Created at: 12/01/2022 01:42 PM ET ---- Time-Spent: 2 minutes 0 seconds

## 2022-12-02 ENCOUNTER — CARE COORDINATION (OUTPATIENT)
Dept: CARE COORDINATION | Age: 72
End: 2022-12-02

## 2022-12-02 NOTE — CARE COORDINATION
Remote Patient Monitoring Note      Date/Time:  12/2/2022 1:17 PM    CCSS reviewed patients reported daily Remote Patient Monitoring metrics. All reported metrics are within alert parameters. Plan/Follow Up:  Will continue to review, monitor and address alerts with follow up based on severity of symptoms and risk factors ---- Current Patient Metrics ---- Activity: - mins Blood Pressure: 106/74, 90bpm Pulseox: 94%, 87bpm Survey: - Weight: 159.8lbs Note Created at: 12/02/2022 01:17 PM ET ---- Time-Spent: 2 minutes 0 seconds

## 2022-12-05 ENCOUNTER — CARE COORDINATION (OUTPATIENT)
Dept: CARE COORDINATION | Age: 72
End: 2022-12-05

## 2022-12-05 ENCOUNTER — CARE COORDINATION (OUTPATIENT)
Dept: CASE MANAGEMENT | Age: 72
End: 2022-12-05

## 2022-12-05 DIAGNOSIS — M51.9 LUMBAR DISC DISEASE: ICD-10-CM

## 2022-12-05 RX ORDER — BACLOFEN 10 MG/1
TABLET ORAL
Qty: 90 TABLET | Refills: 2 | Status: SHIPPED | OUTPATIENT
Start: 2022-12-05

## 2022-12-05 NOTE — CARE COORDINATION
Recorded by: John Morrow 2022 @ 1:45 PM   Time Spent: 5 minutes     Remote Alert Monitoring Note Date/Time: 2022 1:43 PM LPN contacted patient by telephone regarding yellow alert received for blood pressure reading (171/88). Verified patients name and  as identifiers. Background: HTN, COPD Refer to 911 immediately if:  Patient unresponsive or unable to provide history  Change in cognition or sudden confusion  Patient unable to respond in complete sentences  Intense chest pain/tightness  Any concern for any clinical emergency  Red Alert: Provider response time of 1 hr required for any red alert requiring intervention  Yellow Alert: Provider response time of 3hr required for any escalated yellow alert BP Triage Are you having any Chest Pain> no Are you having any Shortness of Breath? no Do you have a headache or have any vision changes> no Are you having any numbness or tingling> no Are you having any other health concerns or issues? no Clinical Interventions: Reviewed and followed up on alerts and treatments-spoke to patient she denies chest pain, SOB, dizziness, headache blurred vision, BP was taken while we were on phone and new reading /80. No concerns Plan/Follow Up: Will continue to review, monitor and address alerts with follow up based on severity of symptoms and risk factors.  ---- Current Patient Metrics ---- Activity: - mins Blood Pressure: 154/80, 86bpm Pulseox: 95%, 77bpm Survey: - Weight: 160.2lbs Note Created at: 2022 01:45 PM ET ---- Time-Spent: 5 minutes 0 seconds

## 2022-12-05 NOTE — CARE COORDINATION
Ambulatory Care Coordination Note  12/5/2022    ACC: Robyn Corbin, RN    Spoke to Georgina(hippa). Patient been using rpm daily. BP elevated but likes keeping track of daily readings. SP02 WNL 96-98% RA. No c/o sob, cp cough fatigue. Does get mild headache intermttently when bp is elevated. Radha Somers is going to call pcp to get in for follow up, says bp only been elevated like this since her fall but not before fall. Handouts were informative and helpful. Sleep medication Belsomra helps a little but still intermittently up and down during the duration of night. No additional needs or assistance at this time. Rn thanked for [facility] [employer] [facility]. Plan:  FU PCP FU BP/HEADACHES  CARE GAPS  ADDITIONAL NEEDS    Offered patient enrollment in the Remote Patient Monitoring (RPM) program for in-home monitoring: Yes, patient enrolled prior and currently. Lab Results       None            Care Coordination Interventions    Referral from Primary Care Provider: No  Suggested Interventions and Community Resources  Fall Risk Prevention: Completed (Comment: 6/30/22 completed)  Meals on Wheels: Completed (Comment: 8/12/22 completed)  Smoking Cessation: Declined (Comment: 10/5/22)  Other Services: Completed (Comment: coa referral 6/30/22)  Zone Management Tools: Completed (Comment: copd completed 8/31/22)  Other Services or Interventions: 8/31/22  [employer]          Goals Addressed                   This Visit's Progress     Medication Management   On track     I will take my medication as directed. I will notify my provider of any problems with medications, like adverse effects or side effects. I will notify my provider/Care Coordinator if I am unable to afford my medications. I will notify my provider for advice before I stop taking any of my medication.     Barriers: lack of education  Plan for overcoming my barriers: acsae RN support.education  Confidence: 7/10  Anticipated Goal Completion Date 12/31/2022 Prior to Admission medications    Medication Sig Start Date End Date Taking? Authorizing Provider   vitamin D3 (CHOLECALCIFEROL) 25 MCG (1000 UT) TABS tablet TAKE 1 TABLET BY MOUTH DAILY 11/28/22   Gisselle Dumont MD   Suvorexant (BELSOMRA) 10 MG TABS Take 10 mg by mouth nightly for 90 days. 11/24/22 2/22/23  Gisselle Dumont MD   lovastatin (MEVACOR) 40 MG tablet TAKE 1 TABLET BY MOUTH EVERY NIGHT 11/14/22   Gisselle Dumont MD   citalopram (CELEXA) 40 MG tablet TAKE 1 TABLET BY MOUTH DAILY 11/2/22   BULMARO Lauren NP   gabapentin (NEURONTIN) 600 MG tablet TAKE 1 TABLET BY MOUTH THREE TIMES DAILY 11/2/22 1/31/23  Gisselle Dumont MD   traMADol (ULTRAM) 50 MG tablet Take 1 tablet by mouth 2 times daily as needed for Pain for up to 90 days.  10/26/22 1/24/23  Gisselel Dumont MD   levothyroxine (SYNTHROID) 25 MCG tablet TAKE 1 TABLET BY MOUTH DAILY 10/13/22   BULMARO Lauren NP   Doxylamine Succinate, Sleep, (SLEEP AID PO) Take 2 tablets by mouth nightly  Patient not taking: Reported on 12/5/2022    Historical Provider, MD   losartan (COZAAR) 25 MG tablet Take 1 tablet by mouth daily 10/11/22   Gisselle Dumont MD   baclofen (LIORESAL) 10 MG tablet TAKE 1 TABLET BY MOUTH THREE TIMES DAILY AS NEEDED FOR MUSCLE SPASM 7/6/22   Gisselle Dumont MD   methylfolate (DEPLIN) 7.5 MG TABS tablet Take 7.5 tablets by mouth daily    Historical Provider, MD   umeclidinium-vilanterol (ANORO ELLIPTA) 62.5-25 MCG/INH AEPB inhaler Inhale 1 puff into the lungs daily 5/23/22   Gisselle Dumont MD   albuterol sulfate  (90 Base) MCG/ACT inhaler INHALE 2 PUFFS BY MOUTH EVERY 6 HOURS AS NEEDED FOR WHEEZING 5/23/22   BULMARO Lauren NP   promethazine (PHENERGAN) 12.5 MG tablet Take 1 tablet by mouth every 8 hours as needed for Nausea TAKE 1 TABLET BY MOUTH EVERY DAY AS NEEDED 10/28/21   BULMARO Lauren - NP   acetaminophen (TYLENOL) 500 MG tablet Take 500 mg by mouth every 6 hours as needed Historical Provider, MD   albuterol (PROVENTIL) (2.5 MG/3ML) 0.083% nebulizer solution Take 3 mLs by nebulization every 6 hours as needed for Wheezing 9/1/20   Vinayak Chandler MD   dicyclomine (BENTYL) 10 MG capsule Take 1 capsule by mouth 4 times daily as needed (abdominal cramping and diarrhea) 12/5/19   Vinayak Chandler MD       No future appointments. ,   COPD Assessment    Does the patient understand envrionmental exposure?: Yes  Is the patient able to verbalize Rescue vs. Long Acting medications?: Yes  Does the patient have a nebulizer?: Yes  Does the patient use a space with inhaled medications?: No     No patient-reported symptoms         Symptoms:          , and   General Assessment    Do you have any symptoms that are causing concern?: No

## 2022-12-06 ENCOUNTER — CARE COORDINATION (OUTPATIENT)
Dept: CASE MANAGEMENT | Age: 72
End: 2022-12-06

## 2022-12-06 NOTE — CARE COORDINATION
Recorded by: Olamide Branch 12/06/2022 @ 3:05 PM   Time Spent: 2 minutes     Remote Patient Monitoring Note Date/Time: 12/6/2022 3:05 PM LPN reviewed patients reported daily Remote Patient Monitoring metrics. All reported metrics are within alert parameters. Plan/Follow Up:  Will continue to review, monitor and address alerts with follow up based on severity of symptoms and risk factors ---- Current Patient Metrics ---- Activity: - mins Blood Pressure: 131/78, 83bpm Pulseox: 97%, 74bpm Survey: - Weight: 160.2lbs Note Created at: 12/06/2022 03:05 PM ET ---- Time-Spent: 2 minutes 0 seconds

## 2022-12-07 ENCOUNTER — CARE COORDINATION (OUTPATIENT)
Dept: CASE MANAGEMENT | Age: 72
End: 2022-12-07

## 2022-12-07 NOTE — CARE COORDINATION
Recorded by: Haresh Tate 12/07/2022 @ 2:31 PM   Time Spent: 2 minutes     Remote Patient Monitoring Note Date/Time: 12/7/2022 2:31 PM LPN reviewed patients reported daily Remote Patient Monitoring metrics. All reported metrics are within alert parameters. Plan/Follow Up:  Will continue to review, monitor and address alerts with follow up based on severity of symptoms and risk factors ---- Current Patient Metrics ---- Activity: - mins Blood Pressure: 148/84, 85bpm Pulseox: 95%, 81bpm Survey: - Weight: 158.6lbs Note Created at: 12/07/2022 02:31 PM ET ---- Time-Spent: 2 minutes 0 seconds

## 2022-12-08 ENCOUNTER — CARE COORDINATION (OUTPATIENT)
Dept: CARE COORDINATION | Age: 72
End: 2022-12-08

## 2022-12-08 NOTE — CARE COORDINATION
Remote Patient Monitoring Note      Date/Time:  12/8/2022 1:28 PM    CCSS reviewed patients reported daily Remote Patient Monitoring metrics. All reported metrics are within alert parameters. Plan/Follow Up:  Will continue to review, monitor and address alerts with follow up based on severity of symptoms and risk factors  --- Current Patient Metrics ---- Activity: - mins Blood Pressure: 155/81, 90bpm Pulseox: 96%, 76bpm Survey: C Weight: 158.8lbs Note Created at: 12/08/2022 01:27 PM ET ---- Time-Spent: 2 minutes 0 seconds

## 2022-12-12 ENCOUNTER — TELEPHONE (OUTPATIENT)
Dept: FAMILY MEDICINE CLINIC | Age: 72
End: 2022-12-12

## 2022-12-12 ENCOUNTER — CARE COORDINATION (OUTPATIENT)
Dept: CASE MANAGEMENT | Age: 72
End: 2022-12-12

## 2022-12-12 DIAGNOSIS — J44.9 CHRONIC OBSTRUCTIVE PULMONARY DISEASE, UNSPECIFIED COPD TYPE (HCC): Primary | ICD-10-CM

## 2022-12-12 NOTE — CARE COORDINATION
Recorded by: Jackie Tyler 2022 @ 2:16 PM   Time Spent: 6 minutes     Remote Patient Monitoring Note Date/Time: 2022 2:13 PM LPN contacted family by telephone regarding red alert received for pulse ox reading (90). Verified patients name and  as identifiers. Background: HTN, COPD Clinical Interventions: Reviewed and followed up on alerts and treatments-spoke to daughter Casper Bowman she states the whole house has the flue cough congestion she states mom is using advair and nebulizer I recommended pushing fluids as her BP was on the low side will continue to monitor Plan/Follow Up: Will continue to review, monitor and address alerts with follow up based on severity of symptoms and risk factors.  ---- Current Patient Metrics ---- Activity: - mins Blood Pressure: 96/65, 89bpm Pulseox: 90%, 87bpm Survey: - Weight: 158.2lbs Note Created at: 2022 02:15 PM ET ---- Time-Spent: 6 minutes 0 seconds

## 2022-12-12 NOTE — TELEPHONE ENCOUNTER
Patient nebulizer the part the solution goes in broke, [facility] can replace it if they have a  new script.  Leane Harder    Please call, [phone]

## 2022-12-12 NOTE — TELEPHONE ENCOUNTER
Please fax order to pharmacy and if this is not what they need please let them to call us or send us something to let me know what to write

## 2022-12-13 ENCOUNTER — CARE COORDINATION (OUTPATIENT)
Dept: CASE MANAGEMENT | Age: 72
End: 2022-12-13

## 2022-12-13 NOTE — CARE COORDINATION
Recorded by: Leticia Castellon 2022 @ 4:08 PM   Time Spent: 3 minutes      Remote Patient Monitoring Note Date/Time: 2022 2:06 PM LPN contacted family by telephone regarding yellow alert received for blood pressure reading (179/71). Verified patients name and  as identifiers. Background: HTN, COPD Clinical Interventions: Called daughter and left HIPPA compliant voice mail with call back information. I did speak to her yesterday and note that the whole family has the flu. Will continue to monitor vitals daily Plan/Follow Up: Will continue to review, monitor and address alerts with follow up based on severity of symptoms and risk factors.  update patient did not call back ---- Current Patient Metrics ---- Activity: - mins Blood Pressure: 179/71, 77bpm Pulseox: 93%, 71bpm Survey: - Weight: 155.6lbs Note Created at: 2022 02:08 PM ET ---- Time-Spent: 3 minutes 0 seconds

## 2022-12-15 ENCOUNTER — CARE COORDINATION (OUTPATIENT)
Dept: CASE MANAGEMENT | Age: 72
End: 2022-12-15

## 2022-12-15 NOTE — CARE COORDINATION
Recorded by: Asim Botello 12/15/2022 @ 2:30 PM   Time Spent: 4 minutes     Remote Patient Monitoring Note Date/Time: 12/15/2022 2:28 PM LPN contacted family by telephone regarding red alert received for survey response (change in sputum). Verified patients name and  as identifiers. Background: HTN, COPD Clinical Interventions: Spoke with daughter vitals al within normal limits the whole family is fighting a cold mom has increased phlegm but id doing well Plan/Follow Up: Will continue to review, monitor and address alerts with follow up based on severity of symptoms and risk factors.  ---- Current Patient Metrics ---- Activity: - mins Blood Pressure: 158/93, 79bpm Pulseox: 93%, 77bpm Survey: C Weight: 155.6lbs Note Created at: 12/15/2022 02:30 PM ET ---- Time-Spent: 4 minutes 0 seconds

## 2022-12-16 ENCOUNTER — CARE COORDINATION (OUTPATIENT)
Dept: CARE COORDINATION | Age: 72
End: 2022-12-16

## 2022-12-16 NOTE — CARE COORDINATION
Remote Patient Monitoring Note      Date/Time:  12/16/2022 2:50 PM    LPN reviewed patients reported daily Remote Patient Monitoring metrics. All reported metrics are within alert parameters. Plan/Follow Up:  Will continue to review, monitor and address alerts with follow up based on severity of symptoms and risk factors     Current Patient Metrics ---- Activity: - mins Blood Pressure: 128/76, 85bpm Pulseox: 92%, 77bpm Survey: - Weight: 156.2lbs Note Created at: 12/16/2022 02:50 PM ET ---- Time-Spent: 2 minutes 0 seconds    Karie Chavez, [id number] [facility] Transition Nurse/ RPM  [phone]

## 2022-12-19 ENCOUNTER — CARE COORDINATION (OUTPATIENT)
Dept: CASE MANAGEMENT | Age: 72
End: 2022-12-19

## 2022-12-19 NOTE — CARE COORDINATION
Recorded by: Natasha Montoya 2022 @ 2:16 PM   Time Spent: 5 minutes     Remote Patient Monitoring Note Date/Time: 2022 2:13 PM LPN contacted patient by telephone regarding no metrics for 3 days. Verified patients name and  as identifiers. Background: HTN, COPD Clinical Interventions: spoke to daughter she states that she spoke to her mom and she was getting ready to take her vitals will contiunue to monitor Plan/Follow Up: Will continue to review, monitor and address alerts with follow up based on severity of symptoms and risk factors.  ---- Current Patient Metrics ---- Activity: - mins Blood Pressure: 142/88, 84bpm Pulseox: 92%, 78bpm Survey: - Weight: 156.6lbs Note Created at: 2022 02:16 PM ET ---- Time-Spent: 5 minutes 0 seconds

## 2022-12-20 ENCOUNTER — CARE COORDINATION (OUTPATIENT)
Dept: CASE MANAGEMENT | Age: 72
End: 2022-12-20

## 2022-12-20 ENCOUNTER — CARE COORDINATION (OUTPATIENT)
Dept: CARE COORDINATION | Age: 72
End: 2022-12-20

## 2022-12-20 NOTE — CARE COORDINATION
Recorded by: Natasha Montoya 12/20/2022 @ 2:08 PM   Time Spent: 2 minutes     Remote Patient Monitoring Note Date/Time: 12/20/2022 2:08 PM LPN reviewed patients reported daily Remote Patient Monitoring metrics. All reported metrics are within alert parameters. Plan/Follow Up:  Will continue to review, monitor and address alerts with follow up based on severity of symptoms and risk factors ---- Current Patient Metrics ---- Activity: - mins Blood Pressure: 110/75, 92bpm Pulseox: 92%, 93bpm Survey: - Weight: 156.4lbs Note Created at: 12/20/2022 02:08 PM ET ---- Time-Spent: 2 minutes 0 seconds

## 2022-12-21 ENCOUNTER — CARE COORDINATION (OUTPATIENT)
Dept: CASE MANAGEMENT | Age: 72
End: 2022-12-21

## 2022-12-21 NOTE — CARE COORDINATION
Recorded by: Marla Kincaid 12/21/2022 @ 3:43 PM   Time Spent: 2 minutes     Remote Patient Monitoring Note Date/Time: 12/21/2022 3:42 PM LPN reviewed patients reported daily Remote Patient Monitoring metrics. All reported metrics are within alert parameters. Plan/Follow Up:  Will continue to review, monitor and address alerts with follow up based on severity of symptoms and risk factors ---- Current Patient Metrics ---- Activity: - mins Blood Pressure: 115/70, 104bpm Pulseox: 97%, 89bpm Survey: - Weight: 157.2lbs Note Created at: 12/21/2022 03:43 PM ET ---- Time-Spent: 2 minutes 0 seconds

## 2022-12-23 ENCOUNTER — CARE COORDINATION (OUTPATIENT)
Dept: CARE COORDINATION | Age: 72
End: 2022-12-23

## 2022-12-23 NOTE — CARE COORDINATION
Remote Patient Monitoring Note  Date/Time:  12/23/2022 4:34 PM  LPN reviewed patients reported daily Remote Patient Monitoring metrics. Pt has not updated daily metrics as of this time. Plan/Follow Up: Will continue to review, monitor and address alerts with follow up based on severity of symptoms and risk factors.    Current Patient Metrics ---- Activity: - mins Blood Pressure: -/-, -bpm Pulseox: -%, -bpm Survey: - Weight: -lbs Note Created at: 12/23/2022 04:34 PM ET ---- Time-Spent: 2 minutes 0 seconds

## 2022-12-27 ENCOUNTER — CARE COORDINATION (OUTPATIENT)
Dept: CARE COORDINATION | Age: 72
End: 2022-12-27

## 2022-12-27 ENCOUNTER — CARE COORDINATION (OUTPATIENT)
Dept: CASE MANAGEMENT | Age: 72
End: 2022-12-27

## 2022-12-27 NOTE — CARE COORDINATION
Remote Patient Monitoring Note  Date/Time:  12/27/2022 3:06 PM  EMTP reviewed patients reported daily Remote Patient Monitoring metrics. Patient has not updates daily metrics. Plan/Follow Up:  Will continue to review, monitor and address alerts with follow up based on severity of symptoms and risk factors Current Patient Metrics ---- Activity: - mins Blood Pressure: -/-, -bpm Pulseox: -%, -bpm Survey: - Weight: -lbs Note Created at: 12/27/2022 03:07 PM ET ---- Time-Spent: 3 minutes 0 seconds

## 2022-12-28 ENCOUNTER — CARE COORDINATION (OUTPATIENT)
Dept: CASE MANAGEMENT | Age: 72
End: 2022-12-28

## 2022-12-28 NOTE — CARE COORDINATION
Recorded by: Adwoa Qiu 12/28/2022 @ 3:08 PM   Time Spent: 2 minutes     Remote Patient Monitoring Note Date/Time: 12/28/2022 3:08 PM LPN reviewed patients reported daily Remote Patient Monitoring metrics. All reported metrics are within alert parameters. Plan/Follow Up:  Will continue to review, monitor and address alerts with follow up based on severity of symptoms and risk factors ---- Current Patient Metrics ---- Activity: - mins Blood Pressure: 152/77, 76bpm Pulseox: 97%, 68bpm Survey: - Weight: 159.6lbs Note Created at: 12/28/2022 03:08 PM ET ---- Time-Spent: 2 minutes 0 seconds

## 2022-12-29 ENCOUNTER — CARE COORDINATION (OUTPATIENT)
Dept: CASE MANAGEMENT | Age: 72
End: 2022-12-29

## 2022-12-29 NOTE — CARE COORDINATION
Recorded by: Nicole Greenwood 12/29/2022 @ 2:38 PM   Time Spent: 2 minutes     Remote Patient Monitoring Note Date/Time: 12/29/2022 2:37 PM LPN reviewed patients reported daily Remote Patient Monitoring metrics. All reported metrics are within alert parameters. Plan/Follow Up:  Will continue to review, monitor and address alerts with follow up based on severity of symptoms and risk factors ---- Current Patient Metrics ---- Activity: - mins Blood Pressure: 132/68, 75bpm Pulseox: 95%, 73bpm Survey: C Weight: 160.2lbs Note Created at: 12/29/2022 02:38 PM ET ---- Time-Spent: 2 minutes 0 seconds

## 2022-12-30 ENCOUNTER — CARE COORDINATION (OUTPATIENT)
Dept: CASE MANAGEMENT | Age: 72
End: 2022-12-30

## 2022-12-30 NOTE — CARE COORDINATION
Recorded by: Ti Messina 12/30/2022 @ 2:30 PM   Time Spent: 2 minutes     Remote Patient Monitoring Note Date/Time: 12/30/2022 2:30 PM LPN reviewed patients reported daily Remote Patient Monitoring metrics. All reported metrics are within alert parameters. Plan/Follow Up:  Will continue to review, monitor and address alerts with follow up based on severity of symptoms and risk factors ---- Current Patient Metrics ---- Activity: - mins Blood Pressure: 132/78, 106bpm Pulseox: 96%, 98bpm Survey: - Weight: 158.8lbs Note Created at: 12/30/2022 02:30 PM ET ---- Time-Spent: 2 minutes 0 seconds

## 2023-01-03 ENCOUNTER — CARE COORDINATION (OUTPATIENT)
Dept: CASE MANAGEMENT | Age: 73
End: 2023-01-03

## 2023-01-03 NOTE — CARE COORDINATION
Recorded by: [place] 2023 @ 3:15 PM   Time Spent: 2 minutes     Remote Patient Monitoring Note Date/Time: 1/3/2023 3:14 PM LPN} contacted patient by telephone regarding yellow alert received for No Vitals in >4 days . Verified patients name and  as identifiers. Background: HTN, COPD Clinical Interventions: Called and left HIPPA compliant message with call back information will continue to monitor Plan/Follow Up: Will continue to review, monitor and address alerts with follow up based on severity of symptoms and risk factors.  ---- Current Patient Metrics ---- Activity: - mins Blood Pressure: -/-, -bpm Pulseox: -%, -bpm Survey: - Weight: -lbs Note Created at: 2023 03:15 PM ET ---- Time-Spent: 2 minutes 0 seconds

## 2023-01-04 ENCOUNTER — CARE COORDINATION (OUTPATIENT)
Dept: CARE COORDINATION | Age: 73
End: 2023-01-04

## 2023-01-04 NOTE — CARE COORDINATION
Remote Patient Monitoring Note      Date/Time:  1/4/2023 1:40 PM    CCSS reviewed patients reported daily Remote Patient Monitoring metrics. All reported metrics are within alert parameters. Plan/Follow Up:  Will continue to review, monitor and address alerts with follow up based on severity of symptoms and risk factors  ---- Current Patient Metrics ---- Activity: - mins Blood Pressure: 128/74, 86bpm Pulseox: 93%, 82bpm Survey: - Weight: 157.2lbs Note Created at: 01/04/2023 01:40 PM ET ---- Time-Spent: 2 minutes 0 seconds

## 2023-01-06 ENCOUNTER — CARE COORDINATION (OUTPATIENT)
Dept: CARE COORDINATION | Age: 73
End: 2023-01-06

## 2023-01-06 NOTE — CARE COORDINATION
Remote Patient Monitoring Note      Date/Time:  1/6/2023 1:46 PM    CCSS reviewed patients reported daily Remote Patient Monitoring metrics. All reported metrics are within alert parameters. Plan/Follow Up:  Will continue to review, monitor and address alerts with follow up based on severity of symptoms and risk factors   ---- Current Patient Metrics ---- Activity: - mins Blood Pressure: 110/66, 84bpm Pulseox: 93%, 72bpm Survey: C Weight: 158.8lbs Note Created at: 01/06/2023 01:46 PM ET ---- Time-Spent: 2 minutes 0 seconds

## 2023-01-09 ENCOUNTER — CARE COORDINATION (OUTPATIENT)
Dept: CARE COORDINATION | Age: 73
End: 2023-01-09

## 2023-01-09 ENCOUNTER — CARE COORDINATION (OUTPATIENT)
Dept: CASE MANAGEMENT | Age: 73
End: 2023-01-09

## 2023-01-09 DIAGNOSIS — I10 HYPERTENSION, UNSPECIFIED TYPE: ICD-10-CM

## 2023-01-09 DIAGNOSIS — J44.9 CHRONIC OBSTRUCTIVE PULMONARY DISEASE, UNSPECIFIED COPD TYPE (HCC): Primary | ICD-10-CM

## 2023-01-09 NOTE — CARE COORDINATION
Remote Patient Monitoring Note      Date/Time:  1/9/2023 2:10 PM    CCSS reviewed patients reported daily Remote Patient Monitoring metrics. All reported metrics are within alert parameters. Plan/Follow Up:  Will continue to review, monitor and address alerts with follow up based on severity of symptoms and risk factors  ---- Current Patient Metrics ---- Activity: - mins Blood Pressure: 135/79, 96bpm Pulseox: 92%, 95bpm Survey: - Weight: 158.2lbs Note Created at: 01/09/2023 02:10 PM ET ---- Time-Spent: 2 minutes 0 seconds

## 2023-01-09 NOTE — CARE COORDINATION
Recorded by: [place] Villa 01/09/2023 @ 2:14 PM   Time Spent: 2 minutes     Remote Patient Monitoring Note Date/Time: 1/9/2023 2:12 PM LPN reviewed patients reported daily Remote Patient Monitoring metrics. All reported metrics are within alert parameters. Plan/Follow Up:  Will continue to review, monitor and address alerts with follow up based on severity of symptoms and risk factors ---- Current Patient Metrics ---- Activity: - mins Blood Pressure: 135/79, 96bpm Pulseox: 92%, 95bpm Survey: - Weight: 158.2lbs Note Created at: 01/09/2023 02:14 PM ET ---- Time-Spent: 2 minutes 0 seconds

## 2023-01-09 NOTE — CARE COORDINATION
CCSS placed call to patient to arrange RPM kit  through [facility]. Reviewed with patient how to pack equipment in original packing. Verified patient's availability to schedule UPS  time. UPS  time requested.  Anticipated  date range 4-7 business days

## 2023-01-09 NOTE — PROGRESS NOTES
1/9/23 3:32 PM     Remote Patient Order Discontinued    Received request from Stiven Atkinson RN to discontinue order for remote patient monitoring of COPD and HTN and order completed.       Vanessa Lemus DNP, FNP-C, Remote Patient Monitoring NP, (Ph) [phone]

## 2023-01-09 NOTE — CARE COORDINATION
Remote Patient Monitoring Graduation      Date/Time:  1/9/2023 2:58 PM    Patient has graduated from the Remote Patient Monitoring program on 1/9/2023. RPM goals have been met at this time. Patient as been provided instruction on process to return RPM equipment and RPM has been deactivated. Patient has ACM's contact information for any further questions, concerns, or needs. Ambulatory Care Coordination Note  1/9/2023      Lab Results       None            Care Coordination Interventions    Referral from Primary Care Provider: No  Suggested Interventions and Community Resources  Fall Risk Prevention: Completed (Comment: 6/30/22 completed)  Meals on Wheels: Completed (Comment: 8/12/22 completed)  Smoking Cessation: Declined (Comment: 10/5/22)  Other Services: Completed (Comment: coa referral 6/30/22)  Zone Management Tools: Completed (Comment: copd completed 8/31/22)  Other Services or Interventions: 8/31/22  local [employer] RESOURCES          Goals Addressed    None         Prior to Admission medications    Medication Sig Start Date End Date Taking? Authorizing Provider   Misc. Devices MISC Nebulizer set up and tubing x 2  J44.9 12/12/22   Angel Harrington MD   baclofen (LIORESAL) 10 MG tablet TAKE 1 TABLET BY MOUTH THREE TIMES DAILY AS NEEDED FOR MUSCLE SPASM 12/5/22   Angel Harrington MD   vitamin D3 (CHOLECALCIFEROL) 25 MCG (1000 UT) TABS tablet TAKE 1 TABLET BY MOUTH DAILY 11/28/22   Angel Harrington MD   Suvorexant (BELSOMRA) 10 MG TABS Take 10 mg by mouth nightly for 90 days.  11/24/22 2/22/23  Angel Harrington MD   lovastatin (MEVACOR) 40 MG tablet TAKE 1 TABLET BY MOUTH EVERY NIGHT 11/14/22   Angel Harrington MD   citalopram (CELEXA) 40 MG tablet TAKE 1 TABLET BY MOUTH DAILY 11/2/22   BULMARO Prado NP   gabapentin (NEURONTIN) 600 MG tablet TAKE 1 TABLET BY MOUTH THREE TIMES DAILY 11/2/22 1/31/23  Angel Harrington MD   traMADol (ULTRAM) 50 MG tablet Take 1 tablet by mouth 2 times daily as needed for Pain for up to 90 days. 10/26/22 1/24/23  Manuel Reddy MD   levothyroxine (SYNTHROID) 25 MCG tablet TAKE 1 TABLET BY MOUTH DAILY 10/13/22   BULMARO Vasquez NP   Doxylamine Succinate, Sleep, (SLEEP AID PO) Take 2 tablets by mouth nightly  Patient not taking: Reported on 12/5/2022    Historical Provider, MD   losartan (COZAAR) 25 MG tablet Take 1 tablet by mouth daily 10/11/22   Manuel Reddy MD   methylfolate (DEPLIN) 7.5 MG TABS tablet Take 7.5 tablets by mouth daily    Historical Provider, MD   umeclidinium-vilanterol (ANORO ELLIPTA) 62.5-25 MCG/INH AEPB inhaler Inhale 1 puff into the lungs daily 5/23/22   Manuel Reddy MD   albuterol sulfate  (90 Base) MCG/ACT inhaler INHALE 2 PUFFS BY MOUTH EVERY 6 HOURS AS NEEDED FOR WHEEZING 5/23/22   BULMARO Vasquez NP   promethazine (PHENERGAN) 12.5 MG tablet Take 1 tablet by mouth every 8 hours as needed for Nausea TAKE 1 TABLET BY MOUTH EVERY DAY AS NEEDED 10/28/21   BULMARO Vasquez NP   acetaminophen (TYLENOL) 500 MG tablet Take 500 mg by mouth every 6 hours as needed     Historical Provider, MD   albuterol (PROVENTIL) (2.5 MG/3ML) 0.083% nebulizer solution Take 3 mLs by nebulization every 6 hours as needed for Wheezing 9/1/20   Manuel Reddy MD   dicyclomine (BENTYL) 10 MG capsule Take 1 capsule by mouth 4 times daily as needed (abdominal cramping and diarrhea) 12/5/19   Manuel Reddy MD       No future appointments. ,   COPD Assessment    Does the patient understand envrionmental exposure?: Yes  Is the patient able to verbalize Rescue vs. Long Acting medications?: Yes  Does the patient have a nebulizer?: Yes  Does the patient use a space with inhaled medications?: No            Symptoms:          , and   General Assessment

## 2023-01-24 ENCOUNTER — CARE COORDINATION (OUTPATIENT)
Dept: CARE COORDINATION | Age: 73
End: 2023-01-24

## 2023-01-24 NOTE — CARE COORDINATION
Ambulatory Care Coordination Note  1/24/2023    ACC: Ralph Wilde RN     Patient goals, education have been completed. Patient/family  verbalized understanding of Instructions/ plan. Appointed scheduled for pcp follow up. Patient and family know when to call emergency services. Patient and family were provided ACM contact information in the even further needs arise. [facility] graduation, will remain available if future needs arise. Lab Results       None            Care Coordination Interventions    Referral from Primary Care Provider: No  Suggested Interventions and Community Resources  Fall Risk Prevention: Completed (Comment: 6/30/22 completed)  Meals on Wheels: Completed (Comment: 8/12/22 completed)  Smoking Cessation: Declined (Comment: 10/5/22)  Other Services: Completed (Comment: coa referral 6/30/22)  Zone Management Tools: Completed (Comment: copd completed 8/31/22)  Other Services or Interventions: 8/31/22  local [employer] RESOURCES          Goals Addressed                   This Visit's Progress     COMPLETED: Medication Management        I will take my medication as directed. I will notify my provider of any problems with medications, like adverse effects or side effects. I will notify my provider/Care Coordinator if I am unable to afford my medications. I will notify my provider for advice before I stop taking any of my medication. Barriers: lack of education  Plan for overcoming my barriers: acm RN support.education  Confidence: 7/10  Anticipated Goal Completion Date 12/31/2022              Prior to Admission medications    Medication Sig Start Date End Date Taking? Authorizing Provider   Misc.  Devices MISC Nebulizer set up and tubing x 2  J44.9 12/12/22   Orlando Hughes MD   baclofen (LIORESAL) 10 MG tablet TAKE 1 TABLET BY MOUTH THREE TIMES DAILY AS NEEDED FOR MUSCLE SPASM 12/5/22   Orlando Hughes MD   vitamin D3 (CHOLECALCIFEROL) 25 MCG (1000 UT) TABS tablet TAKE 1 TABLET BY MOUTH DAILY 11/28/22   Orlando Hughes MD   Suvorexant (BELSOMRA) 10 MG TABS Take 10 mg by mouth nightly for 90 days. 11/24/22 2/22/23  Orlando Hughes MD   lovastatin (MEVACOR) 40 MG tablet TAKE 1 TABLET BY MOUTH EVERY NIGHT 11/14/22   Orlando Hughes MD   citalopram (CELEXA) 40 MG tablet TAKE 1 TABLET BY MOUTH DAILY 11/2/22   Amber Alejandro APRLATHA - NP   gabapentin (NEURONTIN) 600 MG tablet TAKE 1 TABLET BY MOUTH THREE TIMES DAILY 11/2/22 1/31/23  Orlando Hughes MD   traMADol (ULTRAM) 50 MG tablet Take 1 tablet by mouth 2 times daily as needed for Pain for up to 90 days.  10/26/22 1/24/23  Orlando Hughes MD   levothyroxine (SYNTHROID) 25 MCG tablet TAKE 1 TABLET BY MOUTH DAILY 10/13/22   Amber ProBULMARO - NP   losartan (COZAAR) 25 MG tablet Take 1 tablet by mouth daily 10/11/22   Orlando Hughes MD   methylfolate (DEPLIN) 7.5 MG TABS tablet Take 7.5 tablets by mouth daily    Historical Provider, MD   umeclidinium-vilanterol (ANORO ELLIPTA) 62.5-25 MCG/INH AEPB inhaler Inhale 1 puff into the lungs daily 5/23/22   Orlando Hughes MD   albuterol sulfate  (90 Base) MCG/ACT inhaler INHALE 2 PUFFS BY MOUTH EVERY 6 HOURS AS NEEDED FOR WHEEZING 5/23/22   Amber Pro APRLATHA - NP   promethazine (PHENERGAN) 12.5 MG tablet Take 1 tablet by mouth every 8 hours as needed for Nausea TAKE 1 TABLET BY MOUTH EVERY DAY AS NEEDED 10/28/21   Amber Pro APRN - NP   acetaminophen (TYLENOL) 500 MG tablet Take 500 mg by mouth every 6 hours as needed     Historical Provider, MD   albuterol (PROVENTIL) (2.5 MG/3ML) 0.083% nebulizer solution Take 3 mLs by nebulization every 6 hours as needed for Wheezing 9/1/20   Orlando Hughes MD   dicyclomine (BENTYL) 10 MG capsule Take 1 capsule by mouth 4 times daily as needed (abdominal cramping and diarrhea) 12/5/19   Orlando Hughes MD       Future Appointments   Date Time Provider Pablo Kelly   1/26/2023  8:30 AM Orlando Hughes MD [facility]   , Diabetes Assessment    Medic Alert ID: No  Meal Planning: Avoidance of concentrated sweets   How often do you test your blood sugar?: No Testing (Comment: LAST A1C IN [facility] 2021 WAS 5.4%)   Do you have barriers with adherence to non-pharmacologic self-management interventions?  (Nutrition/Exercise/Self-Monitoring): No   Have you ever had to go to the ED for symptoms of low blood sugar?: No       No patient-reported symptoms        ,   COPD Assessment    Does the patient understand envrionmental exposure?: Yes  Is the patient able to verbalize Rescue vs. Long Acting medications?: Yes  Does the patient have a nebulizer?: Yes  Does the patient use a space with inhaled medications?: No     No patient-reported symptoms         Symptoms:          , and   General Assessment    Do you have any symptoms that are causing concern?: Yes  Progression since Onset: Unchanged  Reported Symptoms: Pain (Comment: headaches-[facility] pt for pcp visit)

## 2023-01-27 DIAGNOSIS — G89.4 CHRONIC PAIN SYNDROME: ICD-10-CM

## 2023-01-27 DIAGNOSIS — M47.812 ARTHRITIS OF NECK: ICD-10-CM

## 2023-01-27 DIAGNOSIS — M51.9 LUMBAR DISC DISEASE: ICD-10-CM

## 2023-01-30 RX ORDER — TRAMADOL HYDROCHLORIDE 50 MG/1
50 TABLET ORAL 2 TIMES DAILY PRN
Qty: 60 TABLET | Refills: 0 | Status: SHIPPED | OUTPATIENT
Start: 2023-01-30 | End: 2023-03-01

## 2023-01-31 DIAGNOSIS — I10 PRIMARY HYPERTENSION: ICD-10-CM

## 2023-01-31 RX ORDER — LOSARTAN POTASSIUM 25 MG/1
25 TABLET ORAL DAILY
Qty: 90 TABLET | Refills: 0 | Status: SHIPPED | OUTPATIENT
Start: 2023-01-31

## 2023-02-15 ENCOUNTER — OFFICE VISIT (OUTPATIENT)
Dept: FAMILY MEDICINE CLINIC | Age: 73
End: 2023-02-15

## 2023-02-15 VITALS
DIASTOLIC BLOOD PRESSURE: 60 MMHG | OXYGEN SATURATION: 90 % | BODY MASS INDEX: 25.5 KG/M2 | HEART RATE: 90 BPM | TEMPERATURE: 98.8 F | WEIGHT: 158 LBS | RESPIRATION RATE: 16 BRPM | SYSTOLIC BLOOD PRESSURE: 100 MMHG

## 2023-02-15 DIAGNOSIS — M51.9 LUMBAR DISC DISEASE: ICD-10-CM

## 2023-02-15 DIAGNOSIS — N18.32 STAGE 3B CHRONIC KIDNEY DISEASE (HCC): ICD-10-CM

## 2023-02-15 DIAGNOSIS — E78.00 HYPERCHOLESTEREMIA: ICD-10-CM

## 2023-02-15 DIAGNOSIS — F33.0 MILD EPISODE OF RECURRENT MAJOR DEPRESSIVE DISORDER (HCC): ICD-10-CM

## 2023-02-15 DIAGNOSIS — I10 PRIMARY HYPERTENSION: ICD-10-CM

## 2023-02-15 DIAGNOSIS — E03.9 ACQUIRED HYPOTHYROIDISM: ICD-10-CM

## 2023-02-15 DIAGNOSIS — J44.1 CHRONIC OBSTRUCTIVE PULMONARY DISEASE WITH ACUTE EXACERBATION (HCC): Primary | ICD-10-CM

## 2023-02-15 DIAGNOSIS — G89.4 CHRONIC PAIN SYNDROME: ICD-10-CM

## 2023-02-15 PROBLEM — E11.22 TYPE 2 DIABETES MELLITUS WITH CHRONIC KIDNEY DISEASE (HCC): Status: RESOLVED | Noted: 2022-10-15 | Resolved: 2023-02-15

## 2023-02-15 LAB
INFLUENZA A ANTIGEN, POC: NEGATIVE
INFLUENZA B ANTIGEN, POC: NEGATIVE
LOT EXPIRE DATE: NORMAL
LOT KIT NUMBER: NORMAL
SARS-COV-2, POC: NORMAL
VALID INTERNAL CONTROL: YES
VENDOR AND KIT NAME POC: NORMAL

## 2023-02-15 RX ORDER — DOXYCYCLINE HYCLATE 100 MG
100 TABLET ORAL 2 TIMES DAILY
Qty: 20 TABLET | Refills: 0 | Status: SHIPPED | OUTPATIENT
Start: 2023-02-15 | End: 2023-02-25

## 2023-02-15 RX ORDER — CYCLOBENZAPRINE HCL 10 MG
10 TABLET ORAL NIGHTLY
Qty: 30 TABLET | Refills: 2 | Status: SHIPPED | OUTPATIENT
Start: 2023-02-15 | End: 2023-04-16

## 2023-02-15 RX ORDER — BACLOFEN 10 MG/1
10 TABLET ORAL 2 TIMES DAILY
Qty: 90 TABLET | Refills: 2
Start: 2023-02-15

## 2023-02-15 ASSESSMENT — PATIENT HEALTH QUESTIONNAIRE - PHQ9
5. POOR APPETITE OR OVEREATING: 1
8. MOVING OR SPEAKING SO SLOWLY THAT OTHER PEOPLE COULD HAVE NOTICED. OR THE OPPOSITE, BEING SO FIGETY OR RESTLESS THAT YOU HAVE BEEN MOVING AROUND A LOT MORE THAN USUAL: 0
SUM OF ALL RESPONSES TO PHQ9 QUESTIONS 1 & 2: 0
9. THOUGHTS THAT YOU WOULD BE BETTER OFF DEAD, OR OF HURTING YOURSELF: 0
1. LITTLE INTEREST OR PLEASURE IN DOING THINGS: 0
2. FEELING DOWN, DEPRESSED OR HOPELESS: 0
SUM OF ALL RESPONSES TO PHQ QUESTIONS 1-9: 5
10. IF YOU CHECKED OFF ANY PROBLEMS, HOW DIFFICULT HAVE THESE PROBLEMS MADE IT FOR YOU TO DO YOUR WORK, TAKE CARE OF THINGS AT HOME, OR GET ALONG WITH OTHER PEOPLE: 0
SUM OF ALL RESPONSES TO PHQ QUESTIONS 1-9: 5
6. FEELING BAD ABOUT YOURSELF - OR THAT YOU ARE A FAILURE OR HAVE LET YOURSELF OR YOUR FAMILY DOWN: 0
4. FEELING TIRED OR HAVING LITTLE ENERGY: 1
7. TROUBLE CONCENTRATING ON THINGS, SUCH AS READING THE NEWSPAPER OR WATCHING TELEVISION: 0
3. TROUBLE FALLING OR STAYING ASLEEP: 3

## 2023-02-15 NOTE — PROGRESS NOTES
SUBJECTIVE:  Reuben Ruelas is a 68 y.o. female being evaluated for:    Chief Complaint   Patient presents with    Hypertension     Follow up    Congestion     Pt started Monday with a cough, left ear pain, body aches, headache, head congestion, chest congestion, sore throat       HPI symptoms started about 2 days ago   Congested, ears hurt, throat hurts , nasal drainage clear, head hurts all over mikey in the back Coughing clear sputum  at times  wheezing and sob  NO chest pain or palpitations  Nausea no vomiting, abd pain or diarrhea Achy and sore all over   Chills Daughter sick also   Dizzy and light headed     Pain is the same  Tramadol helps sometimes  Unable to do pain management due to transportation problems  Has seen Dr Camp Sat injections only helped for a couple of days done last summer  MRI with mild disc disease not severe   Belsomra helps her fall to sleep but only stays asleep for an hour  Neck and back pain is keeping her awake     Depression is doing alright   Allergies   Allergen Reactions    Ciprofloxacin Nausea And Vomiting     SEVERE    Nsaids      Kidney disease    Abilify [Aripiprazole] Itching    Benadryl [Diphenhydramine] Other (See Comments)     COMPLETTELY SEDATES HER-PER DAUGHTER    Darvon [Propoxyphene] Rash    Dilaudid [Hydromorphone Hcl] Itching     CAN TOLERATE 1 DOSE IF NOT GIVEN BACK TO BACK-    Hydrocodone-Acetaminophen Nausea And Vomiting     8/23/2020--pt states that only the hydrodocone-acetaminophen combination causes a reaction, but pt doesn't react to acetaminophen by Jenn Mcknight, RN    Morphine Itching and Nausea And Vomiting    Percocet [Oxycodone-Acetaminophen] Other (See Comments)     Makes her loopy,HALLUCINATIONS    Trintellix [Vortioxetine] Other (See Comments)     Hands/feet jittery, nausea     Current Outpatient Medications   Medication Sig Dispense Refill    cyclobenzaprine (FLEXERIL) 10 MG tablet Take 1 tablet by mouth at bedtime 30 tablet 2    doxycycline hyclate (VIBRA-TABS) 100 MG tablet Take 1 tablet by mouth 2 times daily for 10 days 20 tablet 0    baclofen (LIORESAL) 10 MG tablet Take 1 tablet by mouth 2 times daily 90 tablet 2    losartan (COZAAR) 25 MG tablet TAKE 1 TABLET BY MOUTH DAILY 90 tablet 0    traMADol (ULTRAM) 50 MG tablet Take 1 tablet by mouth 2 times daily as needed for Pain for up to 30 days. Max Daily Amount: 100 mg 60 tablet 0    Misc. Devices MISC Nebulizer set up and tubing x 2  J44.9 2 each 1    vitamin D3 (CHOLECALCIFEROL) 25 MCG (1000 UT) TABS tablet TAKE 1 TABLET BY MOUTH DAILY 30 tablet 5    lovastatin (MEVACOR) 40 MG tablet TAKE 1 TABLET BY MOUTH EVERY NIGHT 90 tablet 1    citalopram (CELEXA) 40 MG tablet TAKE 1 TABLET BY MOUTH DAILY 90 tablet 1    gabapentin (NEURONTIN) 600 MG tablet TAKE 1 TABLET BY MOUTH THREE TIMES DAILY 270 tablet 0    levothyroxine (SYNTHROID) 25 MCG tablet TAKE 1 TABLET BY MOUTH DAILY 90 tablet 1    methylfolate (DEPLIN) 7.5 MG TABS tablet Take 7.5 tablets by mouth daily      umeclidinium-vilanterol (ANORO ELLIPTA) 62.5-25 MCG/INH AEPB inhaler Inhale 1 puff into the lungs daily 1 each 5    albuterol sulfate  (90 Base) MCG/ACT inhaler INHALE 2 PUFFS BY MOUTH EVERY 6 HOURS AS NEEDED FOR WHEEZING 20.1 g 0    promethazine (PHENERGAN) 12.5 MG tablet Take 1 tablet by mouth every 8 hours as needed for Nausea TAKE 1 TABLET BY MOUTH EVERY DAY AS NEEDED 90 tablet 0    acetaminophen (TYLENOL) 500 MG tablet Take 500 mg by mouth every 6 hours as needed       albuterol (PROVENTIL) (2.5 MG/3ML) 0.083% nebulizer solution Take 3 mLs by nebulization every 6 hours as needed for Wheezing 120 each 0    dicyclomine (BENTYL) 10 MG capsule Take 1 capsule by mouth 4 times daily as needed (abdominal cramping and diarrhea) 120 capsule 1     No current facility-administered medications for this visit. Social History     Socioeconomic History    Marital status:       Spouse name: Not on file    Number of children: 2 Years of education: Not on file    Highest education level: Not on file   Occupational History    Occupation: NA   Tobacco Use    Smoking status: Every Day     Packs/day: 0.33     Years: 45.00     Pack years: 14.85     Types: Cigarettes     Start date: 8/16/2014    Smokeless tobacco: Never    Tobacco comments:     would like to quit   Vaping Use    Vaping Use: Never used   Substance and Sexual Activity    Alcohol use: No     Alcohol/week: 0.0 standard drinks    Drug use: Never    Sexual activity: Never   Other Topics Concern    Not on file   Social History Narrative    Not on file     Social Determinants of Health     Financial Resource Strain: Low Risk     Difficulty of Paying Living Expenses: Not hard at all   Food Insecurity: Food Insecurity Present    Worried About [address] in the Last Year: Sometimes true    [address] [employer] in the Last Year: Sometimes true   Transportation Needs: No Transportation Needs    Lack of Transportation (Medical): No    Lack of Transportation (Non-Medical): No   Physical Activity: Insufficiently Active    Days of Exercise per Week: 2 days    Minutes of Exercise per Session: 40 min   Stress: No Stress Concern Present    Feeling of Stress : Not at all   Social Connections: Socially Isolated    Frequency of Communication with Friends and Family: Three times a week    Frequency of Social Gatherings with Friends and Family: Three times a week    Attends [employer] Services: Never    Active Member of Clubs or Organizations: No    Attends Club or Organization Meetings: Never    Marital Status:     Intimate Partner Violence: Not on file   Housing Stability: Low Risk     Unable to Pay for Housing in the Last Year: No    Number of Places Lived in the Last Year: 1    Unstable Housing in the Last Year: No      Past Medical History:   Diagnosis Date    Anxiety     Cervical herniated disc     Circulation problem     COPD (chronic obstructive pulmonary disease) (HCC)     CRF (chronic renal failure), stage 3 (moderate) ([facility])     Depression     Depression     Diverticulosis of colon     Fibromyalgia     Hx of blood clots     DVT    Hyperlipidemia     Joint ache     Kidney failure     states begining stage 4 since summer '17    Lumbar disc disease     Lumbar disc disease     Migraine     Muscle spasm of back     Pleural effusion     Prolonged emergence from general anesthesia     SVT (supraventricular tachycardia) ([facility])     Thyroid disease     HYPOTHYROIDISM    Varicose vein      Past Surgical History:   Procedure Laterality Date    BACK INJECTION Bilateral 7/22/2022    BILATERAL SACROILIAC JOINT INJECTION performed by Eran Licona MD at [address]      right inguinal and UMBILICAL-2 SURGERIES    HYSTERECTOMY (CERVIX STATUS UNKNOWN)      HYSTERECTOMY, VAGINAL      PAIN MANAGEMENT PROCEDURE Left 6/12/2020    LUMBAR EPIDURAL STEROID INJECTION LEFT L4-L5 performed by Eran Licona MD at [address] Left 7/8/2022    LUMBAR EPIDURAL STEROID INJECTION   LEFT L4-5 performed by Eran Licona MD at [facility] 26 N/A 11/11/2020    CHOLECYSTECTOMY AND SIGMOID COLECTOMY performed by Darlyn Perez MD at [address] [place] [address]         Review of Systems   Constitutional:  Positive for chills. Negative for fever. HENT:  Positive for congestion, ear pain, rhinorrhea and sore throat. Respiratory:  Positive for cough, shortness of breath and wheezing. Cardiovascular:  Negative for chest pain and palpitations. Gastrointestinal:  Positive for nausea. Negative for abdominal pain, diarrhea and vomiting. Genitourinary:  Negative for dysuria. Musculoskeletal:  Positive for back pain, myalgias and neck pain. Neurological:  Positive for headaches. Negative for dizziness and light-headedness.    Psychiatric/Behavioral:  Positive for dysphoric mood and sleep disturbance. Negative for suicidal ideas. OBJECTIVE:  /60 (Site: Right Upper Arm, Position: Sitting, Cuff Size: Medium Adult)   Pulse 90   Temp 98.8 °F (37.1 °C) (Oral)   Resp 16   Wt 158 lb (71.7 kg)   LMP  (LMP Unknown)   SpO2 90%   BMI 25.50 kg/m²      Body mass index is 25.5 kg/m². Physical Exam  Vitals and nursing note reviewed. Constitutional:       General: She is not in acute distress. Appearance: Normal appearance. She is well-developed. HENT:      Head: Normocephalic and atraumatic. Comments: No facial tenderness      Right Ear: Ear canal normal.      Left Ear: Ear canal normal.      Ears:      Comments: Both tm retracted      Nose: Congestion present. Mouth/Throat:      Mouth: Mucous membranes are dry. Pharynx: No oropharyngeal exudate. Eyes:      Conjunctiva/sclera: Conjunctivae normal.   Neck:      Thyroid: No thyromegaly. Vascular: No carotid bruit. Comments: Thyroid nodule   Cardiovascular:      Rate and Rhythm: Normal rate and regular rhythm. Heart sounds: Normal heart sounds. No murmur heard. No friction rub. No gallop. Pulmonary:      Effort: Pulmonary effort is normal.      Breath sounds: Wheezing present. Chest:      Chest wall: No tenderness. Abdominal:      General: Bowel sounds are normal. There is no distension. Palpations: Abdomen is soft. Tenderness: There is no abdominal tenderness. Comments: No hepatosplenomegaly   Musculoskeletal:         General: Tenderness (Low back and upper back ) present. No swelling. Lymphadenopathy:      Cervical: No cervical adenopathy. Skin:     General: Skin is warm and dry. Neurological:      General: No focal deficit present. Mental Status: She is alert. Motor: No abnormal muscle tone. Gait: Gait normal.   Psychiatric:         Behavior: Behavior normal.         Thought Content:  Thought content normal.      Comments: Flat and depressed ASSESSMENT/PLAN:    Mallorie Guadalupe was seen today for hypertension and congestion. Diagnoses and all orders for this visit:    Chronic obstructive pulmonary disease with acute exacerbation (HCC) on inhalers   -     doxycycline hyclate (VIBRA-TABS) 100 MG tablet; Take 1 tablet by mouth 2 times daily for 10 days  -     POCT COVID-19 & Influenza A/B    Chronic pain syndrome tamadol   -     Complete Urine Tox Screen; Future    Lumbar disc disease see if muscle relaxer helps her sleep   -     cyclobenzaprine (FLEXERIL) 10 MG tablet; Take 1 tablet by mouth at bedtime  -     baclofen (LIORESAL) 10 MG tablet; Take 1 tablet by mouth 2 times daily    Acquired hypothyroidism  -     TSH; Future    Primary hypertension controlled  -     Comprehensive Metabolic Panel; Future    Stage 3b chronic kidney disease ([facility] [address]  -     Comprehensive Metabolic Panel; Future  -     CBC with Auto Differential; Future    Hypercholesteremia  -     Comprehensive Metabolic Panel; Future  -     Lipid Panel; Future    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 There are no Patient Instructions on file for this visit.

## 2023-02-20 ASSESSMENT — ENCOUNTER SYMPTOMS
DIARRHEA: 0
WHEEZING: 1
ABDOMINAL PAIN: 0
VOMITING: 0
SHORTNESS OF BREATH: 1
COUGH: 1
NAUSEA: 1
RHINORRHEA: 1
SORE THROAT: 1
BACK PAIN: 1

## 2023-02-23 ENCOUNTER — TELEPHONE (OUTPATIENT)
Dept: FAMILY MEDICINE CLINIC | Age: 73
End: 2023-02-23

## 2023-02-23 DIAGNOSIS — F51.04 CHRONIC INSOMNIA: Primary | ICD-10-CM

## 2023-02-23 NOTE — TELEPHONE ENCOUNTER
Patient states the cyclobenzaprine is not helping, and she wants to go back on Belsoma so she can sleep. Please advise.

## 2023-02-26 DIAGNOSIS — I10 PRIMARY HYPERTENSION: ICD-10-CM

## 2023-02-27 RX ORDER — LOSARTAN POTASSIUM 25 MG/1
25 TABLET ORAL DAILY
Qty: 90 TABLET | Refills: 0 | Status: SHIPPED | OUTPATIENT
Start: 2023-02-27

## 2023-02-28 NOTE — TELEPHONE ENCOUNTER
Red flag from the nurse about her blood pressure has been consistently high and she should probably move her appointment up we should see her for her Medicare wellness and blood pressure check earlier.   If she is having any neurologic symptoms to go to the ER blood pressure pills and we probably should start her on some No

## 2023-03-06 DIAGNOSIS — M47.812 ARTHRITIS OF NECK: ICD-10-CM

## 2023-03-06 DIAGNOSIS — G89.4 CHRONIC PAIN SYNDROME: ICD-10-CM

## 2023-03-06 DIAGNOSIS — M51.9 LUMBAR DISC DISEASE: ICD-10-CM

## 2023-03-07 RX ORDER — TRAMADOL HYDROCHLORIDE 50 MG/1
50 TABLET ORAL 2 TIMES DAILY PRN
Qty: 60 TABLET | Refills: 2 | Status: SHIPPED | OUTPATIENT
Start: 2023-03-07 | End: 2023-06-05

## 2023-03-07 RX ORDER — GABAPENTIN 600 MG/1
TABLET ORAL
Qty: 270 TABLET | Refills: 0 | Status: SHIPPED | OUTPATIENT
Start: 2023-03-07 | End: 2023-06-05

## 2023-05-02 ENCOUNTER — TELEPHONE (OUTPATIENT)
Dept: FAMILY MEDICINE CLINIC | Age: 73
End: 2023-05-02

## 2023-05-02 NOTE — TELEPHONE ENCOUNTER
Pt wants to know if dr can prescribe methylfoate/deplin 7.5 mg that she used to get at [facility] but unable to get it now. Pharmacy is [facility].     LOV 02/15/2023  NOV none

## 2023-05-03 DIAGNOSIS — F33.0 MILD EPISODE OF RECURRENT MAJOR DEPRESSIVE DISORDER (HCC): ICD-10-CM

## 2023-05-03 RX ORDER — CITALOPRAM 40 MG/1
TABLET ORAL
Qty: 90 TABLET | Refills: 1 | Status: SHIPPED | OUTPATIENT
Start: 2023-05-03

## 2023-05-03 RX ORDER — LEVOMEFOLATE CALCIUM 7.5 MG TABLET
TABLET
Qty: 90 TABLET | Refills: 1 | Status: SHIPPED | OUTPATIENT
Start: 2023-05-03

## 2023-05-03 RX ORDER — LEVOMEFOLATE CALCIUM 7.5 MG TABLET
7.5 TABLET DAILY
Qty: 30 TABLET | Refills: 5 | Status: SHIPPED | OUTPATIENT
Start: 2023-05-03 | End: 2023-05-03

## 2023-05-04 ENCOUNTER — TELEPHONE (OUTPATIENT)
Dept: FAMILY MEDICINE CLINIC | Age: 73
End: 2023-05-04

## 2023-05-07 DIAGNOSIS — M51.9 LUMBAR DISC DISEASE: ICD-10-CM

## 2023-05-08 RX ORDER — CYCLOBENZAPRINE HCL 10 MG
10 TABLET ORAL NIGHTLY
Qty: 30 TABLET | Refills: 1 | Status: SHIPPED | OUTPATIENT
Start: 2023-05-08 | End: 2023-07-03

## 2023-05-08 NOTE — TELEPHONE ENCOUNTER
Pt's daughter Carola Bhagat informed methylfolate (DEPLIN) 7.5 MG TABS tablet is not covered by ins.      Carola Morubi was able to find methylfolate (DEPLIN) 15 mg capsules online is this too high a dose to take one daily or can she take one qod of the 15 mg?

## 2023-05-09 RX ORDER — LOVASTATIN 40 MG/1
TABLET ORAL
Qty: 90 TABLET | Refills: 1 | Status: SHIPPED | OUTPATIENT
Start: 2023-05-09

## 2023-05-11 DIAGNOSIS — G89.4 CHRONIC PAIN SYNDROME: ICD-10-CM

## 2023-05-11 DIAGNOSIS — I10 PRIMARY HYPERTENSION: ICD-10-CM

## 2023-05-11 DIAGNOSIS — E03.9 ACQUIRED HYPOTHYROIDISM: ICD-10-CM

## 2023-05-11 DIAGNOSIS — N18.32 STAGE 3B CHRONIC KIDNEY DISEASE (HCC): ICD-10-CM

## 2023-05-11 DIAGNOSIS — E78.00 HYPERCHOLESTEREMIA: ICD-10-CM

## 2023-05-11 LAB
ALBUMIN SERPL-MCNC: 3.8 G/DL (ref 3.4–5)
ALBUMIN/GLOB SERPL: 1.7 {RATIO} (ref 1.1–2.2)
ALP SERPL-CCNC: 119 U/L (ref 40–129)
ALT SERPL-CCNC: 9 U/L (ref 10–40)
ANION GAP SERPL CALCULATED.3IONS-SCNC: 8 MMOL/L (ref 3–16)
AST SERPL-CCNC: 14 U/L (ref 15–37)
BASOPHILS # BLD: 0.1 K/UL (ref 0–0.2)
BASOPHILS NFR BLD: 0.9 %
BILIRUB SERPL-MCNC: 0.3 MG/DL (ref 0–1)
BUN SERPL-MCNC: 22 MG/DL (ref 7–20)
CALCIUM SERPL-MCNC: 8.7 MG/DL (ref 8.3–10.6)
CHLORIDE SERPL-SCNC: 101 MMOL/L (ref 99–110)
CHOLEST SERPL-MCNC: 203 MG/DL (ref 0–199)
CO2 SERPL-SCNC: 28 MMOL/L (ref 21–32)
CREAT SERPL-MCNC: 1.5 MG/DL (ref 0.6–1.2)
DEPRECATED RDW RBC AUTO: 14.5 % (ref 12.4–15.4)
EOSINOPHIL # BLD: 0.5 K/UL (ref 0–0.6)
EOSINOPHIL NFR BLD: 7.2 %
GFR SERPLBLD CREATININE-BSD FMLA CKD-EPI: 37 ML/MIN/{1.73_M2}
GLUCOSE SERPL-MCNC: 72 MG/DL (ref 70–99)
HCT VFR BLD AUTO: 41.5 % (ref 36–48)
HDLC SERPL-MCNC: 58 MG/DL (ref 40–60)
HGB BLD-MCNC: 13.4 G/DL (ref 12–16)
LDLC SERPL CALC-MCNC: 120 MG/DL
LYMPHOCYTES # BLD: 2 K/UL (ref 1–5.1)
LYMPHOCYTES NFR BLD: 31.1 %
MCH RBC QN AUTO: 30.6 PG (ref 26–34)
MCHC RBC AUTO-ENTMCNC: 32.3 G/DL (ref 31–36)
MCV RBC AUTO: 94.9 FL (ref 80–100)
MONOCYTES # BLD: 0.5 K/UL (ref 0–1.3)
MONOCYTES NFR BLD: 8.1 %
NEUTROPHILS # BLD: 3.5 K/UL (ref 1.7–7.7)
NEUTROPHILS NFR BLD: 52.7 %
PLATELET # BLD AUTO: 267 K/UL (ref 135–450)
PMV BLD AUTO: 8.4 FL (ref 5–10.5)
POTASSIUM SERPL-SCNC: 4 MMOL/L (ref 3.5–5.1)
PROT SERPL-MCNC: 6.1 G/DL (ref 6.4–8.2)
RBC # BLD AUTO: 4.37 M/UL (ref 4–5.2)
SODIUM SERPL-SCNC: 137 MMOL/L (ref 136–145)
TRIGL SERPL-MCNC: 124 MG/DL (ref 0–150)
TSH SERPL DL<=0.005 MIU/L-ACNC: 2.94 UIU/ML (ref 0.27–4.2)
VLDLC SERPL CALC-MCNC: 25 MG/DL
WBC # BLD AUTO: 6.6 K/UL (ref 4–11)

## 2023-05-28 DIAGNOSIS — F51.04 CHRONIC INSOMNIA: ICD-10-CM

## 2023-05-28 DIAGNOSIS — J44.9 CHRONIC OBSTRUCTIVE PULMONARY DISEASE, UNSPECIFIED COPD TYPE (HCC): ICD-10-CM

## 2023-05-30 RX ORDER — MELATONIN
1000 DAILY
Qty: 30 TABLET | Refills: 5 | Status: SHIPPED | OUTPATIENT
Start: 2023-05-30

## 2023-05-30 RX ORDER — SUVOREXANT 20 MG/1
20 TABLET, FILM COATED ORAL NIGHTLY
Qty: 30 TABLET | Refills: 2 | Status: SHIPPED | OUTPATIENT
Start: 2023-05-30 | End: 2023-08-28

## 2023-05-30 RX ORDER — UMECLIDINIUM BROMIDE AND VILANTEROL TRIFENATATE 62.5; 25 UG/1; UG/1
POWDER RESPIRATORY (INHALATION)
Qty: 60 EACH | Refills: 5 | Status: SHIPPED | OUTPATIENT
Start: 2023-05-30

## 2023-06-05 RX ORDER — GABAPENTIN 600 MG/1
TABLET ORAL
Qty: 270 TABLET | Refills: 0 | Status: SHIPPED | OUTPATIENT
Start: 2023-06-05 | End: 2023-09-03

## 2023-06-16 DIAGNOSIS — M47.812 ARTHRITIS OF NECK: ICD-10-CM

## 2023-06-16 DIAGNOSIS — M51.9 LUMBAR DISC DISEASE: ICD-10-CM

## 2023-06-16 DIAGNOSIS — G89.4 CHRONIC PAIN SYNDROME: ICD-10-CM

## 2023-06-16 RX ORDER — TRAMADOL HYDROCHLORIDE 50 MG/1
50 TABLET ORAL 2 TIMES DAILY
Qty: 60 TABLET | Refills: 2 | Status: SHIPPED | OUTPATIENT
Start: 2023-06-16 | End: 2023-09-14

## 2023-06-22 DIAGNOSIS — I10 PRIMARY HYPERTENSION: ICD-10-CM

## 2023-06-22 RX ORDER — LOSARTAN POTASSIUM 25 MG/1
25 TABLET ORAL DAILY
Qty: 90 TABLET | Refills: 0 | Status: SHIPPED | OUTPATIENT
Start: 2023-06-22

## 2023-06-22 NOTE — CARE COORDINATION
Remote Patient Monitoring Note      Date/Time:  10/17/2022 11:52 AM  LPN contacted patient by telephone regarding yellow alert received for blood pressure reading (177/95). Verified patients name and  as identifiers. Background: RPM for HTN, COPD Clinical Interventions: Reviewed and followed up on alerts and treatments-LPN reviewed yellow alert r/t pt's BP reading of 177/95. Pt stated that she feels \"fine. \" Pt denied any HA, chest pain, dizziness and SOB. Pt had not taken her BP meds when she checked and submitted her vitals. Pt advised to take BP meds prior to checking vitals. Pt confirmed proper placement of cuff and does sit quietly with both feet flat on floor while checking BP. Writer will route to ACM and PCP to advise of pt's reading and to note any instructions for the pt. Plan/Follow Up: Will continue to review, monitor and address alerts with follow up based on severity of symptoms and risk factors. Lizette Stoll LPN, [facility] PH: [phone] ---- Current Patient Metrics ---- Activity: - mins Blood Pressure: 177/95, 84bpm Pulseox: 96%, 81bpm Survey: - Weight: 161.4lbs Note Created at: 10/17/2022 11:56 AM ET ---- Time-Spent: 5 minutes 0 seconds    Routed to Jono Adams- 3:18pm  UPDATE- 4:02pm-  Writer called pt back per [facility] request and had pt recheck BP. Pt's new reading is 116/65. Vitals are now WNL.   [facility]oll [facility]: [phone] warm and dry/color normal

## 2023-07-03 DIAGNOSIS — M51.9 LUMBAR DISC DISEASE: ICD-10-CM

## 2023-07-03 RX ORDER — CYCLOBENZAPRINE HCL 10 MG
10 TABLET ORAL NIGHTLY
Qty: 30 TABLET | Refills: 1 | Status: SHIPPED | OUTPATIENT
Start: 2023-07-03 | End: 2023-09-01

## 2023-08-15 ENCOUNTER — OFFICE VISIT (OUTPATIENT)
Dept: FAMILY MEDICINE CLINIC | Age: 73
End: 2023-08-15

## 2023-08-15 VITALS
OXYGEN SATURATION: 96 % | DIASTOLIC BLOOD PRESSURE: 70 MMHG | WEIGHT: 164 LBS | SYSTOLIC BLOOD PRESSURE: 110 MMHG | BODY MASS INDEX: 26.36 KG/M2 | HEART RATE: 91 BPM | TEMPERATURE: 97.2 F | HEIGHT: 66 IN

## 2023-08-15 DIAGNOSIS — F11.20 OPIOID DEPENDENCE WITH CURRENT USE (HCC): ICD-10-CM

## 2023-08-15 DIAGNOSIS — R27.0 ATAXIA: ICD-10-CM

## 2023-08-15 DIAGNOSIS — M54.31 RIGHT SIDED SCIATICA: Primary | ICD-10-CM

## 2023-08-15 RX ORDER — TRAMADOL HYDROCHLORIDE 50 MG/1
50 TABLET ORAL EVERY 8 HOURS PRN
Qty: 90 TABLET | Refills: 1 | Status: SHIPPED | OUTPATIENT
Start: 2023-08-15 | End: 2023-10-14

## 2023-08-15 SDOH — ECONOMIC STABILITY: FOOD INSECURITY: WITHIN THE PAST 12 MONTHS, THE FOOD YOU BOUGHT JUST DIDN'T LAST AND YOU DIDN'T HAVE MONEY TO GET MORE.: PATIENT DECLINED

## 2023-08-15 SDOH — ECONOMIC STABILITY: INCOME INSECURITY: HOW HARD IS IT FOR YOU TO PAY FOR THE VERY BASICS LIKE FOOD, HOUSING, MEDICAL CARE, AND HEATING?: PATIENT DECLINED

## 2023-08-15 SDOH — ECONOMIC STABILITY: FOOD INSECURITY: WITHIN THE PAST 12 MONTHS, YOU WORRIED THAT YOUR FOOD WOULD RUN OUT BEFORE YOU GOT MONEY TO BUY MORE.: PATIENT DECLINED

## 2023-08-15 SDOH — ECONOMIC STABILITY: HOUSING INSECURITY
IN THE LAST 12 MONTHS, WAS THERE A TIME WHEN YOU DID NOT HAVE A STEADY PLACE TO SLEEP OR SLEPT IN A SHELTER (INCLUDING NOW)?: PATIENT REFUSED

## 2023-08-15 NOTE — PROGRESS NOTES
Cardiovascular:      Rate and Rhythm: Normal rate and regular rhythm. Heart sounds: Normal heart sounds. No murmur heard. No friction rub. No gallop. Pulmonary:      Effort: Pulmonary effort is normal.      Breath sounds: Normal breath sounds. Chest:      Chest wall: No tenderness. Abdominal:      General: There is no distension. Palpations: Abdomen is soft. Tenderness: There is no abdominal tenderness. Musculoskeletal:         General: Tenderness (Low back and upper back ) present. No swelling. Cervical back: Neck supple. Tenderness present. Lymphadenopathy:      Cervical: No cervical adenopathy. Skin:     General: Skin is warm and dry. Neurological:      General: No focal deficit present. Mental Status: She is alert. Motor: No abnormal muscle tone. Gait: Gait normal.   Psychiatric:         Behavior: Behavior normal.         Thought Content: Thought content normal.      Comments: Flat and depressed        ASSESSMENT/PLAN:    Majo Mayberry was seen today for medication check and medication refill. Diagnoses and all orders for this visit:    Right sided sciatica  -     MRI LUMBAR SPINE [facility]; Future  -     traMADol (ULTRAM) 50 MG tablet; Take 1 tablet by mouth every 8 hours as needed for Pain for up to 60 days. Intended supply: 7 days. Take lowest dose possible to manage pain Max Daily Amount: 150 mg  -     JOEL Solorzano MD, Neurology, [facility]; Future  -     JOEL Solorzano MD, Neurology, [facility] SPECIALTY [facility]    Opioid dependence with current use [facility])    Increase dose of pain medications   Reviewed oarrs   Orders Placed This Encounter   Medications    traMADol (ULTRAM) 50 MG tablet     Sig: Take 1 tablet by mouth every 8 hours as needed for Pain for up to 60 days. Intended supply: 7 days.  Take lowest dose possible to manage pain Max Daily Amount: 150 mg     Dispense:  90 tablet     Refill:  1

## 2023-08-16 RX ORDER — ALBUTEROL SULFATE 90 UG/1
2 AEROSOL, METERED RESPIRATORY (INHALATION) EVERY 6 HOURS PRN
Qty: 6.7 G | Refills: 2 | Status: SHIPPED | OUTPATIENT
Start: 2023-08-16

## 2023-08-19 ASSESSMENT — ENCOUNTER SYMPTOMS
BACK PAIN: 1
VOMITING: 0
COUGH: 0
SHORTNESS OF BREATH: 0
NAUSEA: 0
ABDOMINAL PAIN: 0
DIARRHEA: 0

## 2023-08-24 DIAGNOSIS — F51.04 CHRONIC INSOMNIA: ICD-10-CM

## 2023-08-24 RX ORDER — SUVOREXANT 20 MG/1
20 TABLET, FILM COATED ORAL NIGHTLY
Qty: 30 TABLET | Refills: 2 | Status: SHIPPED | OUTPATIENT
Start: 2023-08-24 | End: 2023-11-22

## 2023-09-06 DIAGNOSIS — M51.9 LUMBAR DISC DISEASE: ICD-10-CM

## 2023-09-06 RX ORDER — CYCLOBENZAPRINE HCL 10 MG
10 TABLET ORAL NIGHTLY
Qty: 30 TABLET | Refills: 1 | Status: SHIPPED | OUTPATIENT
Start: 2023-09-06 | End: 2023-11-05

## 2023-09-07 RX ORDER — GABAPENTIN 600 MG/1
600 TABLET ORAL 3 TIMES DAILY
Qty: 270 TABLET | Refills: 0 | Status: SHIPPED | OUTPATIENT
Start: 2023-09-07 | End: 2023-12-06

## 2023-09-21 DIAGNOSIS — I10 PRIMARY HYPERTENSION: ICD-10-CM

## 2023-09-21 RX ORDER — LOSARTAN POTASSIUM 25 MG/1
25 TABLET ORAL DAILY
Qty: 90 TABLET | Refills: 0 | Status: SHIPPED | OUTPATIENT
Start: 2023-09-21

## 2023-09-22 ENCOUNTER — HOSPITAL ENCOUNTER (OUTPATIENT)
Dept: MRI IMAGING | Age: 73
Discharge: HOME | End: 2023-09-22
Attending: INTERNAL MEDICINE
Payer: MEDICARE

## 2023-09-22 DIAGNOSIS — M54.31 RIGHT SIDED SCIATICA: ICD-10-CM

## 2023-09-22 DIAGNOSIS — R27.0 ATAXIA: ICD-10-CM

## 2023-09-22 PROCEDURE — 72148 MRI LUMBAR SPINE W/O DYE: CPT

## 2023-09-26 ENCOUNTER — TELEPHONE (OUTPATIENT)
Dept: FAMILY MEDICINE CLINIC | Age: 73
End: 2023-09-26

## 2023-09-26 NOTE — TELEPHONE ENCOUNTER
Moderate disc disease with narrowing at L5/S1 with moderate bilateral neural foraminal narrowing  ?  If would like to do injections  See surgeon

## 2023-11-01 DIAGNOSIS — F33.0 MILD EPISODE OF RECURRENT MAJOR DEPRESSIVE DISORDER (HCC): ICD-10-CM

## 2023-11-01 DIAGNOSIS — M51.9 LUMBAR DISC DISEASE: ICD-10-CM

## 2023-11-01 DIAGNOSIS — M54.31 RIGHT SIDED SCIATICA: ICD-10-CM

## 2023-11-01 RX ORDER — CYCLOBENZAPRINE HCL 10 MG
10 TABLET ORAL NIGHTLY
Qty: 90 TABLET | Refills: 1 | Status: SHIPPED | OUTPATIENT
Start: 2023-11-01

## 2023-11-01 RX ORDER — LOVASTATIN 40 MG/1
40 TABLET ORAL NIGHTLY
Qty: 90 TABLET | Refills: 1 | Status: SHIPPED | OUTPATIENT
Start: 2023-11-01

## 2023-11-01 RX ORDER — TRAMADOL HYDROCHLORIDE 50 MG/1
50 TABLET ORAL EVERY 8 HOURS PRN
Qty: 90 TABLET | Refills: 2 | Status: SHIPPED | OUTPATIENT
Start: 2023-11-01 | End: 2024-01-30

## 2023-11-01 RX ORDER — CITALOPRAM 40 MG/1
TABLET ORAL
Qty: 90 TABLET | Refills: 1 | Status: SHIPPED | OUTPATIENT
Start: 2023-11-01

## 2023-11-06 DIAGNOSIS — I10 PRIMARY HYPERTENSION: ICD-10-CM

## 2023-11-06 RX ORDER — LOSARTAN POTASSIUM 25 MG/1
25 TABLET ORAL DAILY
Qty: 90 TABLET | Refills: 0 | Status: SHIPPED | OUTPATIENT
Start: 2023-11-06

## 2023-11-25 DIAGNOSIS — E03.9 ACQUIRED HYPOTHYROIDISM: ICD-10-CM

## 2023-11-25 DIAGNOSIS — F51.04 CHRONIC INSOMNIA: ICD-10-CM

## 2023-11-25 DIAGNOSIS — N18.32 STAGE 3B CHRONIC KIDNEY DISEASE (HCC): Primary | ICD-10-CM

## 2023-11-25 DIAGNOSIS — E78.00 HYPERCHOLESTEREMIA: ICD-10-CM

## 2023-11-25 DIAGNOSIS — I10 PRIMARY HYPERTENSION: ICD-10-CM

## 2023-11-27 RX ORDER — SUVOREXANT 20 MG/1
20 TABLET, FILM COATED ORAL NIGHTLY
Qty: 30 TABLET | Refills: 2 | Status: SHIPPED | OUTPATIENT
Start: 2023-11-27 | End: 2024-02-25

## 2023-12-04 RX ORDER — LEVOTHYROXINE SODIUM 0.03 MG/1
25 TABLET ORAL DAILY
Qty: 90 TABLET | Refills: 1 | Status: SHIPPED | OUTPATIENT
Start: 2023-12-04

## 2023-12-08 RX ORDER — MELATONIN
1000 DAILY
Qty: 30 TABLET | Refills: 5 | Status: SHIPPED | OUTPATIENT
Start: 2023-12-08

## 2024-02-26 DIAGNOSIS — F51.04 CHRONIC INSOMNIA: ICD-10-CM

## 2024-02-26 RX ORDER — SUVOREXANT 20 MG/1
20 TABLET, FILM COATED ORAL NIGHTLY
Qty: 30 TABLET | Refills: 2 | Status: SHIPPED | OUTPATIENT
Start: 2024-02-26 | End: 2024-05-26

## 2024-02-26 NOTE — TELEPHONE ENCOUNTER
Last OV: 8-  Next OV: None at this time.   Patient is overdue for appointment. Patient scheduled March 12th at 7:30 am.

## 2024-02-27 RX ORDER — SUVOREXANT 20 MG/1
TABLET, FILM COATED ORAL
Qty: 30 TABLET | OUTPATIENT
Start: 2024-02-27

## 2024-03-07 DIAGNOSIS — I10 PRIMARY HYPERTENSION: ICD-10-CM

## 2024-03-08 RX ORDER — LOSARTAN POTASSIUM 25 MG/1
25 TABLET ORAL DAILY
Qty: 90 TABLET | Refills: 0 | Status: SHIPPED | OUTPATIENT
Start: 2024-03-08

## 2024-03-18 ENCOUNTER — OFFICE VISIT (OUTPATIENT)
Dept: FAMILY MEDICINE CLINIC | Age: 74
End: 2024-03-18
Payer: MEDICARE

## 2024-03-18 VITALS
DIASTOLIC BLOOD PRESSURE: 62 MMHG | TEMPERATURE: 98.4 F | HEART RATE: 85 BPM | WEIGHT: 167 LBS | OXYGEN SATURATION: 97 % | BODY MASS INDEX: 26.95 KG/M2 | RESPIRATION RATE: 16 BRPM | SYSTOLIC BLOOD PRESSURE: 100 MMHG

## 2024-03-18 DIAGNOSIS — N18.32 STAGE 3B CHRONIC KIDNEY DISEASE (HCC): ICD-10-CM

## 2024-03-18 DIAGNOSIS — F11.20 OPIOID DEPENDENCE WITH CURRENT USE (HCC): ICD-10-CM

## 2024-03-18 DIAGNOSIS — G89.4 CHRONIC PAIN SYNDROME: ICD-10-CM

## 2024-03-18 DIAGNOSIS — F33.0 MILD EPISODE OF RECURRENT MAJOR DEPRESSIVE DISORDER (HCC): ICD-10-CM

## 2024-03-18 DIAGNOSIS — I10 PRIMARY HYPERTENSION: ICD-10-CM

## 2024-03-18 DIAGNOSIS — J44.1 CHRONIC OBSTRUCTIVE PULMONARY DISEASE WITH ACUTE EXACERBATION (HCC): Primary | ICD-10-CM

## 2024-03-18 DIAGNOSIS — E78.00 HYPERCHOLESTEREMIA: ICD-10-CM

## 2024-03-18 DIAGNOSIS — E03.9 ACQUIRED HYPOTHYROIDISM: ICD-10-CM

## 2024-03-18 DIAGNOSIS — F51.04 CHRONIC INSOMNIA: ICD-10-CM

## 2024-03-18 DIAGNOSIS — Z12.31 ENCOUNTER FOR SCREENING MAMMOGRAM FOR MALIGNANT NEOPLASM OF BREAST: ICD-10-CM

## 2024-03-18 DIAGNOSIS — R53.83 FATIGUE, UNSPECIFIED TYPE: ICD-10-CM

## 2024-03-18 PROCEDURE — 1123F ACP DISCUSS/DSCN MKR DOCD: CPT | Performed by: INTERNAL MEDICINE

## 2024-03-18 PROCEDURE — 3078F DIAST BP <80 MM HG: CPT | Performed by: INTERNAL MEDICINE

## 2024-03-18 PROCEDURE — 99214 OFFICE O/P EST MOD 30 MIN: CPT | Performed by: INTERNAL MEDICINE

## 2024-03-18 PROCEDURE — 3074F SYST BP LT 130 MM HG: CPT | Performed by: INTERNAL MEDICINE

## 2024-03-18 RX ORDER — TRAMADOL HYDROCHLORIDE 50 MG/1
50 TABLET ORAL 2 TIMES DAILY
COMMUNITY
Start: 2024-02-07 | End: 2024-03-18 | Stop reason: SDUPTHER

## 2024-03-18 RX ORDER — TRAMADOL HYDROCHLORIDE 50 MG/1
50 TABLET ORAL EVERY 8 HOURS PRN
Qty: 90 TABLET | Refills: 2 | Status: SHIPPED | OUTPATIENT
Start: 2024-03-18 | End: 2024-07-31

## 2024-03-18 RX ORDER — PREDNISONE 20 MG/1
20 TABLET ORAL DAILY
Qty: 10 TABLET | Refills: 0 | Status: SHIPPED | OUTPATIENT
Start: 2024-03-18 | End: 2024-03-28

## 2024-03-18 RX ORDER — ASPIRIN 81 MG/1
81 TABLET ORAL DAILY
Qty: 90 TABLET | Refills: 0 | COMMUNITY
Start: 2024-03-18

## 2024-03-18 RX ORDER — FLUTICASONE FUROATE, UMECLIDINIUM BROMIDE AND VILANTEROL TRIFENATATE 200; 62.5; 25 UG/1; UG/1; UG/1
1 POWDER RESPIRATORY (INHALATION) DAILY
Qty: 1 EACH | Refills: 5 | Status: SHIPPED | OUTPATIENT
Start: 2024-03-18

## 2024-03-18 ASSESSMENT — ENCOUNTER SYMPTOMS
CHEST TIGHTNESS: 1
SINUS PRESSURE: 0
BACK PAIN: 1
DIARRHEA: 0
COUGH: 1
WHEEZING: 1
VOMITING: 0
SHORTNESS OF BREATH: 1
NAUSEA: 0
ABDOMINAL PAIN: 0

## 2024-03-18 ASSESSMENT — PATIENT HEALTH QUESTIONNAIRE - PHQ9
8. MOVING OR SPEAKING SO SLOWLY THAT OTHER PEOPLE COULD HAVE NOTICED. OR THE OPPOSITE, BEING SO FIGETY OR RESTLESS THAT YOU HAVE BEEN MOVING AROUND A LOT MORE THAN USUAL: NOT AT ALL
5. POOR APPETITE OR OVEREATING: NOT AT ALL
2. FEELING DOWN, DEPRESSED OR HOPELESS: NOT AT ALL
3. TROUBLE FALLING OR STAYING ASLEEP: NOT AT ALL
SUM OF ALL RESPONSES TO PHQ QUESTIONS 1-9: 0
SUM OF ALL RESPONSES TO PHQ9 QUESTIONS 1 & 2: 0
9. THOUGHTS THAT YOU WOULD BE BETTER OFF DEAD, OR OF HURTING YOURSELF: NOT AT ALL
SUM OF ALL RESPONSES TO PHQ QUESTIONS 1-9: 0
SUM OF ALL RESPONSES TO PHQ QUESTIONS 1-9: 0
7. TROUBLE CONCENTRATING ON THINGS, SUCH AS READING THE NEWSPAPER OR WATCHING TELEVISION: NOT AT ALL
6. FEELING BAD ABOUT YOURSELF - OR THAT YOU ARE A FAILURE OR HAVE LET YOURSELF OR YOUR FAMILY DOWN: NOT AT ALL
10. IF YOU CHECKED OFF ANY PROBLEMS, HOW DIFFICULT HAVE THESE PROBLEMS MADE IT FOR YOU TO DO YOUR WORK, TAKE CARE OF THINGS AT HOME, OR GET ALONG WITH OTHER PEOPLE: NOT DIFFICULT AT ALL
1. LITTLE INTEREST OR PLEASURE IN DOING THINGS: NOT AT ALL
4. FEELING TIRED OR HAVING LITTLE ENERGY: NOT AT ALL
SUM OF ALL RESPONSES TO PHQ QUESTIONS 1-9: 0

## 2024-03-18 NOTE — PROGRESS NOTES
shortness of breath and wheezing.    Cardiovascular:  Negative for chest pain, palpitations and leg swelling.   Gastrointestinal:  Negative for abdominal pain, diarrhea, nausea and vomiting.   Genitourinary:  Negative for difficulty urinating and dysuria.   Musculoskeletal:  Positive for back pain and gait problem.   Neurological:  Positive for dizziness and light-headedness. Negative for speech difficulty, weakness, numbness and headaches.   Psychiatric/Behavioral:  Positive for confusion, decreased concentration, dysphoric mood and sleep disturbance (better with belsomra).        OBJECTIVE:  /62 (Site: Left Upper Arm, Position: Sitting, Cuff Size: Medium Adult)   Pulse 85   Temp 98.4 °F (36.9 °C) (Oral)   Resp 16   Wt 75.8 kg (167 lb)   LMP  (LMP Unknown)   SpO2 97%   BMI 26.95 kg/m²      Body mass index is 26.95 kg/m².     Physical Exam  Vitals and nursing note reviewed.   Constitutional:       General: She is not in acute distress.     Appearance: Normal appearance. She is well-developed.   HENT:      Head: Normocephalic and atraumatic.      Comments:       Right Ear: Tympanic membrane and ear canal normal.      Left Ear: Tympanic membrane and ear canal normal.      Nose: Nose normal.      Mouth/Throat:      Mouth: Mucous membranes are dry.      Pharynx: Oropharynx is clear.   Eyes:      Conjunctiva/sclera: Conjunctivae normal.   Neck:      Thyroid: No thyromegaly.      Vascular: No carotid bruit.   Cardiovascular:      Rate and Rhythm: Normal rate and regular rhythm.      Heart sounds: Normal heart sounds. No murmur heard.     No friction rub. No gallop.   Pulmonary:      Effort: Pulmonary effort is normal.      Breath sounds: Wheezing and rhonchi present.   Abdominal:      General: There is no distension.      Palpations: Abdomen is soft.      Tenderness: There is no abdominal tenderness.   Musculoskeletal:         General: Tenderness (Low back and upper back ) present. No swelling.      Cervical

## 2024-04-01 ENCOUNTER — TELEPHONE (OUTPATIENT)
Dept: FAMILY MEDICINE CLINIC | Age: 74
End: 2024-04-01

## 2024-04-01 RX ORDER — FLUCONAZOLE 100 MG/1
100 TABLET ORAL DAILY
Qty: 7 TABLET | Refills: 0 | Status: SHIPPED | OUTPATIENT
Start: 2024-04-01 | End: 2024-04-08

## 2024-04-01 NOTE — TELEPHONE ENCOUNTER
nystatin (MYCOSTATIN) 953177 UNIT/ML suspension is on BACKORDER is there and alternative yo would like to switch it to?

## 2024-04-01 NOTE — TELEPHONE ENCOUNTER
SW patients daughter Georgina. She states she would like to try the Nystatin. Patient is doing better with her chest congestion. She expressed understanding of all other information.

## 2024-04-01 NOTE — TELEPHONE ENCOUNTER
Stop the prednisone.  Question how the chest congestion is doing.  Question if there is any thrush in her mouth we could do some nystatin swish and swallows?

## 2024-04-01 NOTE — TELEPHONE ENCOUNTER
----- Message from Sandrine Brody sent at 4/1/2024  8:47 AM EDT -----  Subject: Medication Problem     Medication: predniSONE (DELTASONE) 20 MG tablet  Dosage: 1 daily  Ordering Provider:     Question/Problem: patient calling stating she was taking this medication   last week to help with chest congestion and she thinks she is having a   reaction to it - she states her mouth and her throat is very sore. please   call patient      Pharmacy: [id number] - [place], [place] - [address] [place] [address] -   [place] [phone] - F [phone]    ---------------------------------------------------------------------------  --------------  CALL BACK INFO  [phone]; OK to leave message on voicemail  ---------------------------------------------------------------------------  --------------    SCRIPT ANSWERS  Relationship to Patient: Self

## 2024-04-24 DIAGNOSIS — F51.04 CHRONIC INSOMNIA: ICD-10-CM

## 2024-04-24 DIAGNOSIS — G89.4 CHRONIC PAIN SYNDROME: ICD-10-CM

## 2024-04-24 DIAGNOSIS — I10 PRIMARY HYPERTENSION: ICD-10-CM

## 2024-04-24 DIAGNOSIS — M51.9 LUMBAR DISC DISEASE: ICD-10-CM

## 2024-04-25 RX ORDER — TRAMADOL HYDROCHLORIDE 50 MG/1
50 TABLET ORAL EVERY 8 HOURS PRN
Qty: 270 TABLET | Refills: 0 | Status: SHIPPED | OUTPATIENT
Start: 2024-04-25 | End: 2024-07-24

## 2024-04-25 RX ORDER — MELATONIN
1000 DAILY
Qty: 90 TABLET | Refills: 0 | Status: SHIPPED | OUTPATIENT
Start: 2024-04-25

## 2024-04-25 RX ORDER — SUVOREXANT 20 MG/1
20 TABLET, FILM COATED ORAL NIGHTLY
Qty: 90 TABLET | Refills: 0 | Status: SHIPPED | OUTPATIENT
Start: 2024-04-25 | End: 2024-07-24

## 2024-04-25 RX ORDER — CYCLOBENZAPRINE HCL 10 MG
10 TABLET ORAL NIGHTLY
Qty: 90 TABLET | Refills: 1 | Status: SHIPPED | OUTPATIENT
Start: 2024-04-25

## 2024-04-25 RX ORDER — LOSARTAN POTASSIUM 25 MG/1
25 TABLET ORAL DAILY
Qty: 90 TABLET | Refills: 1 | Status: SHIPPED | OUTPATIENT
Start: 2024-04-25

## 2024-05-01 RX ORDER — LOVASTATIN 40 MG/1
40 TABLET ORAL NIGHTLY
Qty: 90 TABLET | Refills: 1 | Status: SHIPPED | OUTPATIENT
Start: 2024-05-01

## 2024-05-03 DIAGNOSIS — F33.0 MILD EPISODE OF RECURRENT MAJOR DEPRESSIVE DISORDER (HCC): ICD-10-CM

## 2024-05-03 RX ORDER — CITALOPRAM 40 MG/1
TABLET ORAL
Qty: 90 TABLET | Refills: 1 | Status: SHIPPED | OUTPATIENT
Start: 2024-05-03

## 2024-06-03 DIAGNOSIS — R19.7 DIARRHEA, UNSPECIFIED TYPE: ICD-10-CM

## 2024-06-03 RX ORDER — LEVOTHYROXINE SODIUM 0.03 MG/1
25 TABLET ORAL DAILY
Qty: 90 TABLET | Refills: 1 | Status: SHIPPED | OUTPATIENT
Start: 2024-06-03

## 2024-06-04 RX ORDER — PROMETHAZINE HYDROCHLORIDE 12.5 MG/1
12.5 TABLET ORAL EVERY 8 HOURS PRN
Qty: 90 TABLET | OUTPATIENT
Start: 2024-06-04

## 2024-06-04 RX ORDER — ONDANSETRON 4 MG/1
4 TABLET, FILM COATED ORAL 3 TIMES DAILY PRN
Qty: 30 TABLET | Refills: 0 | Status: SHIPPED | OUTPATIENT
Start: 2024-06-04

## 2024-06-05 ENCOUNTER — TELEPHONE (OUTPATIENT)
Dept: FAMILY MEDICINE CLINIC | Age: 74
End: 2024-06-05

## 2024-06-05 NOTE — TELEPHONE ENCOUNTER
PHU for patient to call the office. She is due for a 6 month follow up 9-. Patient also due for mammogram.    Dressing: pressure dressing with telfa

## 2024-06-20 DIAGNOSIS — I10 PRIMARY HYPERTENSION: ICD-10-CM

## 2024-06-20 RX ORDER — LOSARTAN POTASSIUM 25 MG/1
25 TABLET ORAL DAILY
Qty: 90 TABLET | Refills: 0 | Status: SHIPPED | OUTPATIENT
Start: 2024-06-20

## 2024-07-15 DIAGNOSIS — R19.7 DIARRHEA, UNSPECIFIED TYPE: ICD-10-CM

## 2024-07-15 RX ORDER — PROMETHAZINE HYDROCHLORIDE 12.5 MG/1
12.5 TABLET ORAL EVERY 8 HOURS PRN
Qty: 90 TABLET | Refills: 0 | Status: SHIPPED | OUTPATIENT
Start: 2024-07-15

## 2024-07-15 RX ORDER — GABAPENTIN 600 MG/1
600 TABLET ORAL 3 TIMES DAILY
Qty: 270 TABLET | Refills: 0 | OUTPATIENT
Start: 2024-07-15 | End: 2024-10-13

## 2024-07-15 NOTE — TELEPHONE ENCOUNTER
LOV   3/18/24  NOV    None    Georgina patient daughter called, patient wants the Promethazine, they other medication ordered did not help.

## 2024-07-15 NOTE — TELEPHONE ENCOUNTER
I am guessing she is not using the the gabapentin? Gabapenin has not been filled via [facility] for quit sometime?

## 2024-07-16 RX ORDER — GABAPENTIN 600 MG/1
600 TABLET ORAL 2 TIMES DAILY
Qty: 180 TABLET | Refills: 0 | Status: SHIPPED | OUTPATIENT
Start: 2024-07-16 | End: 2024-10-14

## 2024-07-16 NOTE — TELEPHONE ENCOUNTER
Lm for Georgina letting her know that the phenergan was sent in, asked if her mother is taking gabapentin as it has not been filled in quit sometime per [facility] report, a refill was not sent in for this. If pt is taking gabapentin to let us know.

## 2024-07-26 ENCOUNTER — TELEPHONE (OUTPATIENT)
Dept: FAMILY MEDICINE CLINIC | Age: 74
End: 2024-07-26

## 2024-07-26 NOTE — TELEPHONE ENCOUNTER
SW daughter regarding this. Patient was advised to go to the ED to be evaluated for this. She expressed understanding and will take her today.

## 2024-07-26 NOTE — TELEPHONE ENCOUNTER
----- Message from Nikkie Mccainrolly sent at 7/26/2024  2:12 PM EDT -----  Regarding: ECC Escalation To Practice  ECC Escalation To Practice      Type of Escalation: Red Flag Symptom  --------------------------------------------------------------------------------------------------------------------------    Information for Provider:  Patient is looking for appointment for: Symptom Patient experience swelling in her tick bite started July 11, 2024.    Reasons for Message: Unable to reach practice and doesn't want to have urgent care.    Additional Information Patient experience swelling in her tick bite started July 11, 2024. Please callback the patient. Thank you.    --------------------------------------------------------------------------------------------------------------------------    Relationship to Patient: Self     Call Back Info: OK to leave message on voicemail  Preferred Call Back Number: Phone  [phone]

## 2024-07-29 ENCOUNTER — OFFICE VISIT (OUTPATIENT)
Dept: FAMILY MEDICINE CLINIC | Age: 74
End: 2024-07-29
Payer: MEDICARE

## 2024-07-29 VITALS
TEMPERATURE: 98.4 F | HEIGHT: 66 IN | DIASTOLIC BLOOD PRESSURE: 74 MMHG | BODY MASS INDEX: 26.65 KG/M2 | WEIGHT: 165.8 LBS | SYSTOLIC BLOOD PRESSURE: 110 MMHG

## 2024-07-29 DIAGNOSIS — S20.161A TICK BITE OF RIGHT BREAST, INITIAL ENCOUNTER: Primary | ICD-10-CM

## 2024-07-29 DIAGNOSIS — E03.9 ACQUIRED HYPOTHYROIDISM: ICD-10-CM

## 2024-07-29 DIAGNOSIS — E78.00 HYPERCHOLESTEREMIA: ICD-10-CM

## 2024-07-29 DIAGNOSIS — J44.9 CHRONIC OBSTRUCTIVE PULMONARY DISEASE, UNSPECIFIED COPD TYPE (HCC): ICD-10-CM
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
## 2024-07-29 DIAGNOSIS — K57.32 SIGMOID DIVERTICULITIS: ICD-10-CM

## 2024-07-29 DIAGNOSIS — S20.161A TICK BITE OF RIGHT BREAST, INITIAL ENCOUNTER: ICD-10-CM
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
## 2024-07-29 DIAGNOSIS — R53.83 FATIGUE, UNSPECIFIED TYPE: ICD-10-CM

## 2024-07-29 LAB
ALBUMIN SERPL-MCNC: 3.8 G/DL (ref 3.4–5)
ALBUMIN/GLOB SERPL: 1.7 {RATIO} (ref 1.1–2.2)
ALP SERPL-CCNC: 127 U/L (ref 40–129)
ALT SERPL-CCNC: 10 U/L (ref 10–40)
ANION GAP SERPL CALCULATED.3IONS-SCNC: 13 MMOL/L (ref 3–16)
AST SERPL-CCNC: 14 U/L (ref 15–37)
BASOPHILS # BLD: 0.1 K/UL (ref 0–0.2)
BASOPHILS NFR BLD: 0.9 %
BILIRUB SERPL-MCNC: 0.3 MG/DL (ref 0–1)
BUN SERPL-MCNC: 22 MG/DL (ref 7–20)
CALCIUM SERPL-MCNC: 8.9 MG/DL (ref 8.3–10.6)
CHLORIDE SERPL-SCNC: 98 MMOL/L (ref 99–110)
CHOLEST SERPL-MCNC: 171 MG/DL (ref 0–199)
CO2 SERPL-SCNC: 25 MMOL/L (ref 21–32)
CREAT SERPL-MCNC: 1.9 MG/DL (ref 0.6–1.2)
DEPRECATED RDW RBC AUTO: 13.9 % (ref 12.4–15.4)
EOSINOPHIL # BLD: 0.3 K/UL (ref 0–0.6)
EOSINOPHIL NFR BLD: 4.1 %
GFR SERPLBLD CREATININE-BSD FMLA CKD-EPI: 27 ML/MIN/{1.73_M2}
GLUCOSE SERPL-MCNC: 105 MG/DL (ref 70–99)
HCT VFR BLD AUTO: 40.6 % (ref 36–48)
HDLC SERPL-MCNC: 59 MG/DL (ref 40–60)
HGB BLD-MCNC: 13 G/DL (ref 12–16)
LDLC SERPL CALC-MCNC: 95 MG/DL
LYMPHOCYTES # BLD: 1.8 K/UL (ref 1–5.1)
LYMPHOCYTES NFR BLD: 26.6 %
MCH RBC QN AUTO: 30.4 PG (ref 26–34)
MCHC RBC AUTO-ENTMCNC: 32 G/DL (ref 31–36)
MCV RBC AUTO: 95 FL (ref 80–100)
MONOCYTES # BLD: 0.4 K/UL (ref 0–1.3)
MONOCYTES NFR BLD: 6.2 %
NEUTROPHILS # BLD: 4.3 K/UL (ref 1.7–7.7)
NEUTROPHILS NFR BLD: 62.2 %
PLATELET # BLD AUTO: 226 K/UL (ref 135–450)
PMV BLD AUTO: 9.8 FL (ref 5–10.5)
POTASSIUM SERPL-SCNC: 4.8 MMOL/L (ref 3.5–5.1)
PROT SERPL-MCNC: 6 G/DL (ref 6.4–8.2)
RBC # BLD AUTO: 4.28 M/UL (ref 4–5.2)
SODIUM SERPL-SCNC: 136 MMOL/L (ref 136–145)
TRIGL SERPL-MCNC: 83 MG/DL (ref 0–150)
TSH SERPL DL<=0.005 MIU/L-ACNC: 2.27 UIU/ML (ref 0.27–4.2)
VLDLC SERPL CALC-MCNC: 17 MG/DL
WBC # BLD AUTO: 6.9 K/UL (ref 4–11)

## 2024-07-29 PROCEDURE — 1123F ACP DISCUSS/DSCN MKR DOCD: CPT

## 2024-07-29 PROCEDURE — 99213 OFFICE O/P EST LOW 20 MIN: CPT

## 2024-07-29 RX ORDER — CITALOPRAM 40 MG/1
40 TABLET ORAL DAILY
COMMUNITY
End: 2024-07-31 | Stop reason: SDUPTHER

## 2024-07-29 RX ORDER — ALBUTEROL SULFATE 2.5 MG/3ML
2.5 SOLUTION RESPIRATORY (INHALATION) EVERY 6 HOURS PRN
Qty: 120 EACH | Refills: 0 | Status: SHIPPED | OUTPATIENT
Start: 2024-07-29

## 2024-07-29 RX ORDER — LEVOMEFOLATE CALCIUM 7.5 MG TABLET
1 TABLET DAILY
Qty: 90 TABLET | Refills: 1 | Status: SHIPPED | OUTPATIENT
Start: 2024-07-29

## 2024-07-29 ASSESSMENT — ENCOUNTER SYMPTOMS
TROUBLE SWALLOWING: 0
NAUSEA: 0
SORE THROAT: 0
SHORTNESS OF BREATH: 0
COLOR CHANGE: 0
CONSTIPATION: 0
DIARRHEA: 0
BACK PAIN: 0
BLOOD IN STOOL: 0
COUGH: 0
SINUS PRESSURE: 0
VOMITING: 0
APNEA: 0
ABDOMINAL PAIN: 0
WHEEZING: 0

## 2024-07-29 NOTE — PATIENT INSTRUCTIONS
Thank you for choosing [facility].    Please bring a current list of medications to every appointment.    Please contact your pharmacy for any prescription refill(s) that you are requesting.    Cigarette Smoking and Its Health Risks     [facility] offers Free Smoking Cessation Classes several times a year. For information on the Freedom From Smoking Classes please call [phone].     GENERAL INFORMATION:   Smoking and your health: Cigarette smoking is the most preventable cause of illness and death in the [place]. A large number of Americans smoke cigarettes, and each year more than one million children and adults start smoking cigarettes. Many people die every year from illnesses caused by smoking. People who smoke die earlier than those who do not smoke. The risk of disease increases if you smoke a lot, inhale deeply, or have smoked many years.  Why are cigarettes bad for you? Cigarettes are filled with poison that goes into the lungs when you inhale. Coughing, dizziness, and burning of the eyes, nose, and throat are early signs that smoking is harming you. Smoking increases your health risks if you have diabetes, high blood pressure, or high blood cholesterol. The long-term problems of smoking cigarettes are the following:   Cancer: Smoking increases your chances of getting cancer. Cigarette smoking may play a role in developing many kinds of cancer. Lung cancer is the most common kind of cancer caused by smoking. A smoker is at greater risk of getting cancer of the lips, mouth, throat, or voice box. Smokers also have a higher risk of getting esophagus, stomach, kidney, pancreas, cervix, bladder, and skin cancer.    Heart and blood vessel disease:     If you already have heart or blood vessel problems and smoke, you are at even greater risk of having continued or worse health problems. The nicotine in the tobacco causes an increase in your heart rate and blood pressure. The arteries (blood vessels)

## 2024-07-29 NOTE — PROGRESS NOTES
Talya Rubi (:  1950) is a 74 y.o. female,Established patient, here for evaluation of the following chief complaint(s):  Insect Bite (Patient c/o tick bite on right breast since 2024; redness and swelling) and Medication Refill (Patient request Rx refill: Abluterol Nebulizer Solution and Deplin tablet)      ASSESSMENT/PLAN:  1. Tick bite of right breast, initial encounter  Assessment & Plan:    Site of tick bite clear of concern of infection. No swelling, redness or tenderness on exam, healing well.  Will check CMP and burgdorferi to rule out lyme disease. Continue to monitor symptoms at home, monitor for fever or rash  Orders:  -     Comprehensive Metabolic Panel; Future  -     B. BURGDORFERI ANTIBODIES BY WB; Future  2. Chronic obstructive pulmonary disease, unspecified COPD type (HCC)  Meds refilled  -     albuterol (PROVENTIL) (2.5 MG/3ML) 0.083% nebulizer solution; Take 3 mLs by nebulization every 6 hours as needed for Wheezing, Disp-120 each, R-0Normal  3. Sigmoid diverticulitis  Assessment & Plan:    New referral given again to Dr. Arroyo per pt request  Orders:  -     Farzana Arroyo, Lawson Lieberman MD, General Surgery, [facility]      No follow-ups on file.    SUBJECTIVE/OBJECTIVE:    Talya presents with daughter with concerns of a tick bite on the right breast.  She noticed this on .  She thought it was from work, remove the tick.  She states she removed the entire tick.  At that time, there was noticeable redness, mild swelling and possible pus.  Talya kept this clean.  She did feel like she had chills, denies fever did not take her temperature. She did notice more leg cramping lately.    Since then, she states swelling and redness has resolved.  Denies any new chills or fevers.  No new rashes.  Spot on right breast has now cleared, small \"knot\" noticed.      Current Outpatient Medications   Medication Sig Dispense Refill    citalopram (CELEXA) 40 MG tablet Take 1 tablet by mouth

## 2024-07-29 NOTE — ASSESSMENT & PLAN NOTE
Site of tick bite clear of concern of infection. No swelling, redness or tenderness on exam, healing well.  Will check CMP and burgdorferi to rule out lyme disease

## 2024-07-31 LAB
B BURGDOR IGG SER QL IB: NEGATIVE
B BURGDOR IGM SER QL IB: NEGATIVE

## 2024-07-31 RX ORDER — CITALOPRAM 40 MG/1
40 TABLET ORAL DAILY
Qty: 30 TABLET | Refills: 3 | Status: SHIPPED | OUTPATIENT
Start: 2024-07-31

## 2024-08-07 DIAGNOSIS — G89.4 CHRONIC PAIN SYNDROME: ICD-10-CM

## 2024-08-07 DIAGNOSIS — F51.04 CHRONIC INSOMNIA: ICD-10-CM

## 2024-08-07 RX ORDER — SUVOREXANT 20 MG/1
TABLET, FILM COATED ORAL
Qty: 90 TABLET | OUTPATIENT
Start: 2024-08-07

## 2024-08-07 RX ORDER — TRAMADOL HYDROCHLORIDE 50 MG/1
50 TABLET ORAL EVERY 8 HOURS PRN
Qty: 90 TABLET | Refills: 1 | Status: SHIPPED | OUTPATIENT
Start: 2024-08-07 | End: 2024-10-06

## 2024-08-07 NOTE — TELEPHONE ENCOUNTER
Last OV: 7-  Next OV: 10-  Please advise on this RX request.   
[de-identified] : wound clean dry and intact no erythema no drainage FAROM NV improved sutures removed

## 2024-08-16 DIAGNOSIS — F51.04 CHRONIC INSOMNIA: ICD-10-CM

## 2024-08-18 RX ORDER — SUVOREXANT 20 MG/1
20 TABLET, FILM COATED ORAL NIGHTLY
Qty: 90 TABLET | Refills: 0 | Status: SHIPPED | OUTPATIENT
Start: 2024-08-25 | End: 2024-11-23

## 2024-08-27 ENCOUNTER — OFFICE VISIT (OUTPATIENT)
Dept: SURGERY | Age: 74
End: 2024-08-27
Payer: MEDICARE

## 2024-08-27 VITALS — WEIGHT: 165 LBS | HEIGHT: 66 IN | BODY MASS INDEX: 26.52 KG/M2

## 2024-08-27 DIAGNOSIS — R10.32 LLQ PAIN: Primary | ICD-10-CM

## 2024-08-27 DIAGNOSIS — R10.32 LLQ PAIN: ICD-10-CM

## 2024-08-27 LAB
ALBUMIN SERPL-MCNC: 3.7 G/DL (ref 3.4–5)
ALBUMIN/GLOB SERPL: 1.8 {RATIO} (ref 1.1–2.2)
ALP SERPL-CCNC: 118 U/L (ref 40–129)
ALT SERPL-CCNC: 9 U/L (ref 10–40)
ANION GAP SERPL CALCULATED.3IONS-SCNC: 10 MMOL/L (ref 3–16)
AST SERPL-CCNC: 16 U/L (ref 15–37)
BASOPHILS # BLD: 0.1 K/UL (ref 0–0.2)
BASOPHILS NFR BLD: 1.1 %
BILIRUB SERPL-MCNC: <0.2 MG/DL (ref 0–1)
BUN SERPL-MCNC: 20 MG/DL (ref 7–20)
CALCIUM SERPL-MCNC: 9.2 MG/DL (ref 8.3–10.6)
CHLORIDE SERPL-SCNC: 98 MMOL/L (ref 99–110)
CO2 SERPL-SCNC: 28 MMOL/L (ref 21–32)
CREAT SERPL-MCNC: 1.6 MG/DL (ref 0.6–1.2)
DEPRECATED RDW RBC AUTO: 13.7 % (ref 12.4–15.4)
EOSINOPHIL # BLD: 0.4 K/UL (ref 0–0.6)
EOSINOPHIL NFR BLD: 6.5 %
GFR SERPLBLD CREATININE-BSD FMLA CKD-EPI: 34 ML/MIN/{1.73_M2}
GLUCOSE SERPL-MCNC: 109 MG/DL (ref 70–99)
HCT VFR BLD AUTO: 39.6 % (ref 36–48)
HGB BLD-MCNC: 13.2 G/DL (ref 12–16)
LYMPHOCYTES # BLD: 1.8 K/UL (ref 1–5.1)
LYMPHOCYTES NFR BLD: 30.4 %
MCH RBC QN AUTO: 31 PG (ref 26–34)
MCHC RBC AUTO-ENTMCNC: 33.4 G/DL (ref 31–36)
MCV RBC AUTO: 92.9 FL (ref 80–100)
MONOCYTES # BLD: 0.5 K/UL (ref 0–1.3)
MONOCYTES NFR BLD: 8.1 %
NEUTROPHILS # BLD: 3.3 K/UL (ref 1.7–7.7)
NEUTROPHILS NFR BLD: 53.9 %
PLATELET # BLD AUTO: 234 K/UL (ref 135–450)
PMV BLD AUTO: 8.9 FL (ref 5–10.5)
POTASSIUM SERPL-SCNC: 4.5 MMOL/L (ref 3.5–5.1)
PROT SERPL-MCNC: 5.8 G/DL (ref 6.4–8.2)
RBC # BLD AUTO: 4.26 M/UL (ref 4–5.2)
SODIUM SERPL-SCNC: 136 MMOL/L (ref 136–145)
WBC # BLD AUTO: 6.1 K/UL (ref 4–11)

## 2024-08-27 PROCEDURE — 1123F ACP DISCUSS/DSCN MKR DOCD: CPT | Performed by: SURGERY

## 2024-08-27 PROCEDURE — 99213 OFFICE O/P EST LOW 20 MIN: CPT | Performed by: SURGERY

## 2024-08-28 RX ORDER — CHOLECALCIFEROL (VITAMIN D3) 25 MCG
1000 TABLET ORAL DAILY
Qty: 90 TABLET | Refills: 0 | Status: SHIPPED | OUTPATIENT
Start: 2024-08-28

## 2024-08-30 RX ORDER — LEVOTHYROXINE SODIUM 25 UG/1
25 TABLET ORAL DAILY
Qty: 90 TABLET | Refills: 1 | Status: SHIPPED | OUTPATIENT
Start: 2024-08-30

## 2024-08-30 ASSESSMENT — ENCOUNTER SYMPTOMS
ABDOMINAL DISTENTION: 1
ABDOMINAL PAIN: 1

## 2024-08-30 NOTE — PROGRESS NOTES
Talya Rubi (:  1950) is a 74 y.o. female,Established patient, here for evaluation of the following chief complaint(s):  Surgical Consult (Possible diverticulosis reoccurrence  )         Assessment & Plan  LLQ pain       Orders:    CBC with Auto Differential; Future    Comprehensive Metabolic Panel; Future    CT ABDOMEN PELVIS W IV CONTRAST Additional Contrast? Oral; Future    Follow up after testing       Subjective   HPI  Chief Complaint: abdominal pain    Patient presents for evaluation of recurring pain. Patient reports symptoms of sharp, stabbing pain. Location of symptoms is LLQ and is similar to previous diverticulitis flares. Symptoms were first noted last week. Previous evaluation includes PCP exam. Patient has a history of diverticulitis with sigmoid resection in . Will plan following treatment: labs and CT to assess.      Past Medical History:   Diagnosis Date    Anxiety     Cervical herniated disc     Circulation problem     COPD (chronic obstructive pulmonary disease) ([facility])     CRF (chronic renal failure), stage 3 (moderate) ([facility])     Depression     Depression     Diverticulosis of colon     Fibromyalgia     Hx of blood clots     DVT    Hyperlipidemia     Joint ache     Kidney failure     states begining stage 4 since summer '17    Lumbar disc disease     Lumbar disc disease     Migraine     Muscle spasm of back     Pleural effusion     Prolonged emergence from general anesthesia     SVT (supraventricular tachycardia) ([facility])     Thyroid disease     HYPOTHYROIDISM    Varicose vein        Past Surgical History:   Procedure Laterality Date    BACK INJECTION Bilateral 2022    BILATERAL SACROILIAC JOINT INJECTION performed by Elysia Simons MD at [facility] ENDOSCOPY    COLONOSCOPY      HERNIA REPAIR      right inguinal and UMBILICAL-2 SURGERIES    HYSTERECTOMY (CERVIX STATUS UNKNOWN)      HYSTERECTOMY, VAGINAL      PAIN MANAGEMENT PROCEDURE Left 2020    LUMBAR EPIDURAL

## 2024-09-05 ENCOUNTER — HOSPITAL ENCOUNTER (OUTPATIENT)
Dept: CT IMAGING | Age: 74
Discharge: HOME | End: 2024-09-05
Attending: SURGERY
Payer: MEDICARE

## 2024-09-05 DIAGNOSIS — R10.32 LLQ PAIN: ICD-10-CM

## 2024-09-05 PROCEDURE — 74176 CT ABD & PELVIS W/O CONTRAST: CPT

## 2024-09-05 PROCEDURE — 6360000004 HC RX CONTRAST MEDICATION: Performed by: SURGERY

## 2024-09-05 RX ADMIN — DIATRIZOATE MEGLUMINE AND DIATRIZOATE SODIUM 30 ML: 660; 100 LIQUID ORAL; RECTAL at 10:45

## 2024-09-09 ENCOUNTER — TELEPHONE (OUTPATIENT)
Dept: VASCULAR SURGERY | Age: 74
End: 2024-09-09

## 2024-09-09 DIAGNOSIS — R10.10 PAIN OF UPPER ABDOMEN: Primary | ICD-10-CM

## 2024-09-22 ENCOUNTER — APPOINTMENT (OUTPATIENT)
Dept: CT IMAGING | Age: 74
End: 2024-09-22
Payer: MEDICARE

## 2024-09-22 ENCOUNTER — HOSPITAL ENCOUNTER (EMERGENCY)
Age: 74
Discharge: HOME | End: 2024-09-22
Payer: MEDICARE

## 2024-09-22 VITALS
HEIGHT: 66 IN | DIASTOLIC BLOOD PRESSURE: 67 MMHG | OXYGEN SATURATION: 97 % | RESPIRATION RATE: 17 BRPM | BODY MASS INDEX: 26.63 KG/M2 | SYSTOLIC BLOOD PRESSURE: 129 MMHG | TEMPERATURE: 98.2 F | HEART RATE: 86 BPM

## 2024-09-22 DIAGNOSIS — Z87.19 HISTORY OF DIVERTICULITIS: ICD-10-CM

## 2024-09-22 DIAGNOSIS — R55 VASOVAGAL SYNCOPE: ICD-10-CM

## 2024-09-22 DIAGNOSIS — G89.29 CHRONIC ABDOMINAL PAIN: ICD-10-CM

## 2024-09-22 DIAGNOSIS — K52.9 COLITIS: Primary | ICD-10-CM

## 2024-09-22 DIAGNOSIS — R10.9 CHRONIC ABDOMINAL PAIN: ICD-10-CM

## 2024-09-22 DIAGNOSIS — N18.9 CHRONIC KIDNEY DISEASE, UNSPECIFIED CKD STAGE: ICD-10-CM

## 2024-09-22 LAB
ALBUMIN SERPL-MCNC: 3.9 G/DL (ref 3.4–5)
ALBUMIN/GLOB SERPL: 1.3 {RATIO} (ref 1.1–2.2)
ALP SERPL-CCNC: 141 U/L (ref 40–129)
ALT SERPL-CCNC: 10 U/L (ref 10–40)
ANION GAP SERPL CALCULATED.3IONS-SCNC: 8 MMOL/L (ref 3–16)
AST SERPL-CCNC: 19 U/L (ref 15–37)
BASOPHILS # BLD: 0 K/UL (ref 0–0.2)
BASOPHILS NFR BLD: 0.8 %
BILIRUB SERPL-MCNC: 0.3 MG/DL (ref 0–1)
BUN SERPL-MCNC: 20 MG/DL (ref 7–20)
CALCIUM SERPL-MCNC: 9 MG/DL (ref 8.3–10.6)
CHLORIDE SERPL-SCNC: 100 MMOL/L (ref 99–110)
CO2 SERPL-SCNC: 28 MMOL/L (ref 21–32)
CREAT SERPL-MCNC: 1.7 MG/DL (ref 0.6–1.2)
DEPRECATED RDW RBC AUTO: 14.3 % (ref 12.4–15.4)
EOSINOPHIL # BLD: 0.3 K/UL (ref 0–0.6)
EOSINOPHIL NFR BLD: 5 %
GFR SERPLBLD CREATININE-BSD FMLA CKD-EPI: 31 ML/MIN/{1.73_M2}
GLUCOSE SERPL-MCNC: 117 MG/DL (ref 70–99)
HCT VFR BLD AUTO: 41.6 % (ref 36–48)
HGB BLD-MCNC: 13.1 G/DL (ref 12–16)
LACTATE BLDV-SCNC: 1.3 MMOL/L (ref 0.4–2)
LIPASE SERPL-CCNC: 31 U/L (ref 13–60)
LYMPHOCYTES # BLD: 1.6 K/UL (ref 1–5.1)
LYMPHOCYTES NFR BLD: 26.2 %
MCH RBC QN AUTO: 29 PG (ref 26–34)
MCHC RBC AUTO-ENTMCNC: 31.5 G/DL (ref 31–36)
MCV RBC AUTO: 92.3 FL (ref 80–100)
MONOCYTES # BLD: 0.4 K/UL (ref 0–1.3)
MONOCYTES NFR BLD: 6.7 %
NEUTROPHILS # BLD: 3.8 K/UL (ref 1.7–7.7)
NEUTROPHILS NFR BLD: 61.3 %
PLATELET # BLD AUTO: 204 K/UL (ref 135–450)
PMV BLD AUTO: 9 FL (ref 5–10.5)
POTASSIUM SERPL-SCNC: 4.8 MMOL/L (ref 3.5–5.1)
PROT SERPL-MCNC: 6.8 G/DL (ref 6.4–8.2)
RBC # BLD AUTO: 4.5 M/UL (ref 4–5.2)
SARS-COV-2 RDRP RESP QL NAA+PROBE: NOT DETECTED
SODIUM SERPL-SCNC: 136 MMOL/L (ref 136–145)
TROPONIN, HIGH SENSITIVITY: 26 NG/L (ref 0–14)
TROPONIN, HIGH SENSITIVITY: 35 NG/L (ref 0–14)
WBC # BLD AUTO: 6.2 K/UL (ref 4–11)

## 2024-09-22 PROCEDURE — 85025 COMPLETE CBC W/AUTO DIFF WBC: CPT

## 2024-09-22 PROCEDURE — 2580000003 HC RX 258: Performed by: PHYSICIAN ASSISTANT

## 2024-09-22 PROCEDURE — 83690 ASSAY OF LIPASE: CPT

## 2024-09-22 PROCEDURE — 36415 COLL VENOUS BLD VENIPUNCTURE: CPT

## 2024-09-22 PROCEDURE — 6360000002 HC RX W HCPCS: Performed by: PHYSICIAN ASSISTANT

## 2024-09-22 PROCEDURE — 6370000000 HC RX 637 (ALT 250 FOR IP): Performed by: PHYSICIAN ASSISTANT

## 2024-09-22 PROCEDURE — 96374 THER/PROPH/DIAG INJ IV PUSH: CPT

## 2024-09-22 PROCEDURE — 84484 ASSAY OF TROPONIN QUANT: CPT

## 2024-09-22 PROCEDURE — 93005 ELECTROCARDIOGRAM TRACING: CPT | Performed by: STUDENT IN AN ORGANIZED HEALTH CARE EDUCATION/TRAINING PROGRAM

## 2024-09-22 PROCEDURE — 80053 COMPREHEN METABOLIC PANEL: CPT

## 2024-09-22 PROCEDURE — 99284 EMERGENCY DEPT VISIT MOD MDM: CPT

## 2024-09-22 PROCEDURE — 83605 ASSAY OF LACTIC ACID: CPT

## 2024-09-22 PROCEDURE — 87635 SARS-COV-2 COVID-19 AMP PRB: CPT

## 2024-09-22 PROCEDURE — 74176 CT ABD & PELVIS W/O CONTRAST: CPT

## 2024-09-22 RX ORDER — DICYCLOMINE HCL 20 MG
20 TABLET ORAL 4 TIMES DAILY PRN
Qty: 30 TABLET | Refills: 0 | Status: SHIPPED | OUTPATIENT
Start: 2024-09-22

## 2024-09-22 RX ORDER — ONDANSETRON 2 MG/ML
4 INJECTION INTRAMUSCULAR; INTRAVENOUS ONCE
Status: COMPLETED | OUTPATIENT
Start: 2024-09-22 | End: 2024-09-22

## 2024-09-22 RX ORDER — DICYCLOMINE HYDROCHLORIDE 10 MG/1
10 CAPSULE ORAL ONCE
Status: COMPLETED | OUTPATIENT
Start: 2024-09-22 | End: 2024-09-22

## 2024-09-22 RX ORDER — 0.9 % SODIUM CHLORIDE 0.9 %
1000 INTRAVENOUS SOLUTION INTRAVENOUS ONCE
Status: COMPLETED | OUTPATIENT
Start: 2024-09-22 | End: 2024-09-22

## 2024-09-22 RX ADMIN — SODIUM CHLORIDE 1000 ML: 9 INJECTION, SOLUTION INTRAVENOUS at 17:21

## 2024-09-22 RX ADMIN — DICYCLOMINE HYDROCHLORIDE 10 MG: 10 CAPSULE ORAL at 17:16

## 2024-09-22 RX ADMIN — ONDANSETRON 4 MG: 2 INJECTION INTRAMUSCULAR; INTRAVENOUS at 17:18

## 2024-09-22 ASSESSMENT — PAIN DESCRIPTION - FREQUENCY: FREQUENCY: CONTINUOUS

## 2024-09-22 ASSESSMENT — PAIN DESCRIPTION - LOCATION
LOCATION: ABDOMEN
LOCATION: ABDOMEN

## 2024-09-22 ASSESSMENT — PAIN DESCRIPTION - ONSET: ONSET: SUDDEN

## 2024-09-22 ASSESSMENT — PAIN DESCRIPTION - PAIN TYPE: TYPE: ACUTE PAIN

## 2024-09-22 ASSESSMENT — PAIN DESCRIPTION - DESCRIPTORS
DESCRIPTORS: SHARP
DESCRIPTORS: SHARP

## 2024-09-22 ASSESSMENT — PAIN SCALES - GENERAL
PAINLEVEL_OUTOF10: 10
PAINLEVEL_OUTOF10: 10

## 2024-09-22 ASSESSMENT — PAIN DESCRIPTION - ORIENTATION: ORIENTATION: OTHER (COMMENT)

## 2024-09-23 LAB
EKG ATRIAL RATE: 74 BPM
EKG DIAGNOSIS: NORMAL
EKG P AXIS: 56 DEGREES
EKG P-R INTERVAL: 182 MS
EKG Q-T INTERVAL: 424 MS
EKG QRS DURATION: 76 MS
EKG QTC CALCULATION (BAZETT): 470 MS
EKG R AXIS: 53 DEGREES
EKG T AXIS: 66 DEGREES
EKG VENTRICULAR RATE: 74 BPM

## 2024-09-23 PROCEDURE — 93010 ELECTROCARDIOGRAM REPORT: CPT | Performed by: INTERNAL MEDICINE

## 2024-09-24 ENCOUNTER — TELEPHONE (OUTPATIENT)
Dept: VASCULAR SURGERY | Age: 74
End: 2024-09-24

## 2024-10-07 NOTE — TELEPHONE ENCOUNTER
LM for patient to call the office regarding this information.   
Please advise on this RX refill. Note states that considering dc phenergan due to patients age.   RX not refilled in 2 years.   
Tell her we will try Zofran first if not working then to let us know I will call in the Phenergan  
Statement Selected

## 2024-10-10 RX ORDER — GABAPENTIN 600 MG/1
600 TABLET ORAL 2 TIMES DAILY
Qty: 180 TABLET | Refills: 0 | Status: SHIPPED | OUTPATIENT
Start: 2024-10-10 | End: 2025-01-08

## 2024-10-17 ENCOUNTER — OFFICE VISIT (OUTPATIENT)
Dept: FAMILY MEDICINE CLINIC | Age: 74
End: 2024-10-17

## 2024-10-17 VITALS
DIASTOLIC BLOOD PRESSURE: 78 MMHG | OXYGEN SATURATION: 93 % | RESPIRATION RATE: 16 BRPM | HEIGHT: 66 IN | BODY MASS INDEX: 25.68 KG/M2 | SYSTOLIC BLOOD PRESSURE: 110 MMHG | WEIGHT: 159.8 LBS | TEMPERATURE: 97.9 F | HEART RATE: 76 BPM

## 2024-10-17 VITALS
WEIGHT: 159.8 LBS | SYSTOLIC BLOOD PRESSURE: 110 MMHG | BODY MASS INDEX: 25.68 KG/M2 | TEMPERATURE: 97.9 F | DIASTOLIC BLOOD PRESSURE: 78 MMHG | RESPIRATION RATE: 16 BRPM | HEIGHT: 66 IN

## 2024-10-17 DIAGNOSIS — E78.00 HYPERCHOLESTEREMIA: ICD-10-CM

## 2024-10-17 DIAGNOSIS — E03.9 ACQUIRED HYPOTHYROIDISM: ICD-10-CM

## 2024-10-17 DIAGNOSIS — M51.9 LUMBAR DISC DISEASE: ICD-10-CM

## 2024-10-17 DIAGNOSIS — H40.9 GLAUCOMA OF BOTH EYES, UNSPECIFIED GLAUCOMA TYPE: ICD-10-CM

## 2024-10-17 DIAGNOSIS — I10 PRIMARY HYPERTENSION: ICD-10-CM

## 2024-10-17 DIAGNOSIS — N18.32 STAGE 3B CHRONIC KIDNEY DISEASE (HCC): ICD-10-CM

## 2024-10-17 DIAGNOSIS — G89.4 CHRONIC PAIN SYNDROME: ICD-10-CM

## 2024-10-17 DIAGNOSIS — Z00.00 MEDICARE ANNUAL WELLNESS VISIT, SUBSEQUENT: Primary | ICD-10-CM

## 2024-10-17 DIAGNOSIS — R13.10 DYSPHAGIA, UNSPECIFIED TYPE: ICD-10-CM

## 2024-10-17 DIAGNOSIS — K52.9 COLITIS: Primary | Chronic | ICD-10-CM

## 2024-10-17 DIAGNOSIS — Z83.49 FAMILY HISTORY OF MTHFR DEFICIENCY: ICD-10-CM

## 2024-10-17 RX ORDER — CITALOPRAM HYDROBROMIDE 40 MG/1
40 TABLET ORAL DAILY
Qty: 90 TABLET | Refills: 1 | Status: SHIPPED | OUTPATIENT
Start: 2024-10-17

## 2024-10-17 RX ORDER — TRAMADOL HYDROCHLORIDE 50 MG/1
TABLET ORAL
COMMUNITY
Start: 2024-09-18 | End: 2024-10-17 | Stop reason: SDUPTHER

## 2024-10-17 RX ORDER — LEVOMEFOLATE CALCIUM 7.5 MG TABLET
1 TABLET DAILY
Qty: 90 TABLET | Refills: 1 | Status: SHIPPED | OUTPATIENT
Start: 2024-10-17

## 2024-10-17 RX ORDER — LOSARTAN POTASSIUM 25 MG/1
25 TABLET ORAL DAILY
Qty: 90 TABLET | Refills: 0 | Status: SHIPPED | OUTPATIENT
Start: 2024-10-17

## 2024-10-17 RX ORDER — CYCLOBENZAPRINE HCL 10 MG
TABLET ORAL
Qty: 90 TABLET | Refills: 5 | Status: SHIPPED | OUTPATIENT
Start: 2024-10-17

## 2024-10-17 RX ORDER — TRAMADOL HYDROCHLORIDE 50 MG/1
50 TABLET ORAL EVERY 8 HOURS PRN
Qty: 90 TABLET | Refills: 2 | Status: SHIPPED | OUTPATIENT
Start: 2024-10-17 | End: 2025-01-15

## 2024-10-17 RX ORDER — LOVASTATIN 40 MG
40 TABLET ORAL NIGHTLY
Qty: 90 TABLET | Refills: 1 | Status: SHIPPED | OUTPATIENT
Start: 2024-10-17

## 2024-10-17 SDOH — ECONOMIC STABILITY: FOOD INSECURITY: WITHIN THE PAST 12 MONTHS, THE FOOD YOU BOUGHT JUST DIDN'T LAST AND YOU DIDN'T HAVE MONEY TO GET MORE.: NEVER TRUE

## 2024-10-17 SDOH — ECONOMIC STABILITY: FOOD INSECURITY: WITHIN THE PAST 12 MONTHS, YOU WORRIED THAT YOUR FOOD WOULD RUN OUT BEFORE YOU GOT MONEY TO BUY MORE.: SOMETIMES TRUE

## 2024-10-17 SDOH — ECONOMIC STABILITY: INCOME INSECURITY: HOW HARD IS IT FOR YOU TO PAY FOR THE VERY BASICS LIKE FOOD, HOUSING, MEDICAL CARE, AND HEATING?: NOT HARD AT ALL

## 2024-10-17 ASSESSMENT — ENCOUNTER SYMPTOMS
BLOOD IN STOOL: 0
BACK PAIN: 1
VOMITING: 0
TROUBLE SWALLOWING: 1
ABDOMINAL PAIN: 0
COUGH: 0
DIARRHEA: 1
SINUS PRESSURE: 0
ROS SKIN COMMENTS: NO CONCERNING SKIN LESIONS
WHEEZING: 1
SHORTNESS OF BREATH: 0
NAUSEA: 0

## 2024-10-17 ASSESSMENT — PATIENT HEALTH QUESTIONNAIRE - PHQ9
5. POOR APPETITE OR OVEREATING: NOT AT ALL
SUM OF ALL RESPONSES TO PHQ QUESTIONS 1-9: 0
6. FEELING BAD ABOUT YOURSELF - OR THAT YOU ARE A FAILURE OR HAVE LET YOURSELF OR YOUR FAMILY DOWN: NOT AT ALL
7. TROUBLE CONCENTRATING ON THINGS, SUCH AS READING THE NEWSPAPER OR WATCHING TELEVISION: NOT AT ALL
9. THOUGHTS THAT YOU WOULD BE BETTER OFF DEAD, OR OF HURTING YOURSELF: NOT AT ALL
SUM OF ALL RESPONSES TO PHQ9 QUESTIONS 1 & 2: 0
8. MOVING OR SPEAKING SO SLOWLY THAT OTHER PEOPLE COULD HAVE NOTICED. OR THE OPPOSITE, BEING SO FIGETY OR RESTLESS THAT YOU HAVE BEEN MOVING AROUND A LOT MORE THAN USUAL: NOT AT ALL
1. LITTLE INTEREST OR PLEASURE IN DOING THINGS: NOT AT ALL
10. IF YOU CHECKED OFF ANY PROBLEMS, HOW DIFFICULT HAVE THESE PROBLEMS MADE IT FOR YOU TO DO YOUR WORK, TAKE CARE OF THINGS AT HOME, OR GET ALONG WITH OTHER PEOPLE: NOT DIFFICULT AT ALL
SUM OF ALL RESPONSES TO PHQ QUESTIONS 1-9: 0
SUM OF ALL RESPONSES TO PHQ QUESTIONS 1-9: 0
2. FEELING DOWN, DEPRESSED OR HOPELESS: NOT AT ALL
4. FEELING TIRED OR HAVING LITTLE ENERGY: NOT AT ALL
SUM OF ALL RESPONSES TO PHQ QUESTIONS 1-9: 0
3. TROUBLE FALLING OR STAYING ASLEEP: NOT AT ALL

## 2024-10-17 ASSESSMENT — LIFESTYLE VARIABLES
HOW OFTEN DO YOU HAVE A DRINK CONTAINING ALCOHOL: NEVER
HOW MANY STANDARD DRINKS CONTAINING ALCOHOL DO YOU HAVE ON A TYPICAL DAY: PATIENT DOES NOT DRINK

## 2024-10-17 NOTE — PROGRESS NOTES
as needed for Pain for up to 90 days. Max Daily Amount: 150 mg    Stage 3b chronic kidney disease (HCC)  -     Comprehensive Metabolic Panel; Future  -     CBC with Auto Differential; Future    Primary hypertension controlled   -     losartan (COZAAR) 25 MG tablet; Take 1 tablet by mouth daily  -     Comprehensive Metabolic Panel; Future    Hypercholesteremia  -     Lipid Panel; Future        -     lovastatin (MEVACOR) 40 MG tablet; Take 1 tablet by mouth nightly    Glaucoma of both eyes, unspecified glaucoma type  -     AFL - Deneen Ball MD, (Glaucoma) Ophthalmology, [place]    Acquired hypothyroidism  -     TSH; Future    Family history of MTHFR deficiency  -     Homocysteine; Future  -     Vitamin B12 & Folate; Future        -     methylfolate (DEPLIN) 7.5 MG TABS tablet; Take 1 tablet by mouth daily    Depression  doing well continue same dose          -     citalopram (CELEXA) 40 MG tablet; Take 1 tablet by mouth daily    Vaccines   -     Influenza, FLUAD Trivalent, (age 65 y+), IM, Preservative Free, 0.5mL  -     Pneumococcal, PCV20, PREVNAR 20, (age 6w+), IM, PF              Orders Placed This Encounter   Medications    citalopram (CELEXA) 40 MG tablet     Sig: Take 1 tablet by mouth daily     Dispense:  90 tablet     Refill:  1    cyclobenzaprine (FLEXERIL) 10 MG tablet     Si/2 to 1 tablet tid prn     Dispense:  90 tablet     Refill:  5    losartan (COZAAR) 25 MG tablet     Sig: Take 1 tablet by mouth daily     Dispense:  90 tablet     Refill:  0    lovastatin (MEVACOR) 40 MG tablet     Sig: Take 1 tablet by mouth nightly     Dispense:  90 tablet     Refill:  1    traMADol (ULTRAM) 50 MG tablet     Sig: Take 1 tablet by mouth every 8 hours as needed for Pain for up to 90 days. Max Daily Amount: 150 mg     Dispense:  90 tablet     Refill:  2     Reduce doses taken as pain becomes manageable    methylfolate (DEPLIN) 7.5 MG TABS tablet     Sig: Take 1 tablet by mouth daily     Dispense:  90

## 2024-10-17 NOTE — PROGRESS NOTES
Medicare Annual Wellness Visit    Talya Rubi is here for Medicare AWV    Assessment & Plan   Medicare annual wellness visit, subsequent  Recommendations for Preventive Services Due: see orders and patient instructions/AVS.  Recommended screening schedule for the next 5-10 years is provided to the patient in written form: see Patient Instructions/AVS.     Return in 1 year (on 10/17/2025) for Medicare AWV.     Subjective   Medicare AWV    Patient's complete Health Risk Assessment and screening values have been reviewed and are found in Flowsheets. The following problems were reviewed today and where indicated follow up appointments were made and/or referrals ordered.    Positive Risk Factor Screenings with Interventions:            Controlled Medication Review:    Today's Pain Level: No data recorded   Opioid Risk: (Low risk score <55) Opioid risk score: 46    Patient is low risk for opioid use disorder or overdose.    Last PDMP Lawson as Reviewed:  Review User Review Instant Review Result   SKINNY FRANKS 10/10/2024  6:01 PM     Reviewed PDMP [1]     Last Controlled Substance Monitoring Documentation      Flowsheet Row Office Visit from 12/5/2019 in [facility] Internal Medicine   Chronic Pain > 50 MEDD Obtained or confirmed \"Consent for Opioid Use\" on file., Re-evaluated the status of the patient's underlying condition causing pain. filed at 12/25/2019 5866            Opioid Risk: (Low risk score <= 55)  Opioid risk score: 46    Any opioid medication usage will be addressed by their licensed provider at a future visit.       General HRA Questions:  Select all that apply: (!) New or Increased Pain  Interventions - Pain:  See AVS for additional education material      Inactivity:  On average, how many days per week do you engage in moderate to strenuous exercise (like a brisk walk)?: 0 days (!) Abnormal  On average, how many minutes do you engage in exercise at this level?: 0 min  Interventions:  See AVS for

## 2024-11-13 DIAGNOSIS — F51.04 CHRONIC INSOMNIA: ICD-10-CM

## 2024-11-13 RX ORDER — SUVOREXANT 20 MG/1
20 TABLET, FILM COATED ORAL NIGHTLY
Qty: 90 TABLET | Refills: 0 | Status: SHIPPED | OUTPATIENT
Start: 2024-11-16 | End: 2025-02-14

## 2024-12-04 RX ORDER — CHOLECALCIFEROL (VITAMIN D3) 25 MCG
1000 TABLET ORAL DAILY
Qty: 90 TABLET | Refills: 0 | Status: SHIPPED | OUTPATIENT
Start: 2024-12-04

## 2024-12-18 DIAGNOSIS — I10 PRIMARY HYPERTENSION: ICD-10-CM

## 2024-12-18 RX ORDER — LOSARTAN POTASSIUM 25 MG/1
25 TABLET ORAL DAILY
Qty: 90 TABLET | Refills: 0 | Status: SHIPPED | OUTPATIENT
Start: 2024-12-18

## 2025-01-07 RX ORDER — GABAPENTIN 600 MG/1
600 TABLET ORAL 2 TIMES DAILY
Qty: 180 TABLET | Refills: 0 | Status: SHIPPED | OUTPATIENT
Start: 2025-01-07 | End: 2025-04-07

## 2025-02-12 DIAGNOSIS — F51.04 CHRONIC INSOMNIA: ICD-10-CM

## 2025-02-13 RX ORDER — SUVOREXANT 20 MG/1
20 TABLET, FILM COATED ORAL NIGHTLY
Qty: 90 TABLET | Refills: 0 | Status: SHIPPED | OUTPATIENT
Start: 2025-02-13 | End: 2025-05-14

## 2025-03-03 RX ORDER — LOVASTATIN 40 MG/1
40 TABLET ORAL NIGHTLY
Qty: 90 TABLET | Refills: 0 | Status: SHIPPED | OUTPATIENT
Start: 2025-03-03

## 2025-03-03 RX ORDER — CHOLECALCIFEROL (VITAMIN D3) 25 MCG
1000 TABLET ORAL DAILY
Qty: 90 TABLET | Refills: 0 | Status: SHIPPED | OUTPATIENT
Start: 2025-03-03

## 2025-03-06 ENCOUNTER — TELEPHONE (OUTPATIENT)
Dept: FAMILY MEDICINE CLINIC | Age: 75
End: 2025-03-06

## 2025-03-06 NOTE — TELEPHONE ENCOUNTER
----- Message from Ryley HERNANDES sent at 3/6/2025  3:47 PM EST -----  Regarding: ECC Escalation To Practice  ECC Escalation To Practice      Type of Escalation: Acute Care Symptom  --------------------------------------------------------------------------------------------------------------------------    Information for Provider:  Patient is looking for appointment for:cold for a week  Reasons for Message: Unable to reach practice     Additional Information: that is in need of an urgent appointment, there was limited availability and the patient refused/or could not locate a Walk-in near their home.try to contact the practice but the practice did not answer  --------------------------------------------------------------------------------------------------------------------------    Relationship to Patient: Self  Call Back Info: OK to leave message on voicemail  Preferred Call Back Number:[phone]

## 2025-03-07 ENCOUNTER — OFFICE VISIT (OUTPATIENT)
Dept: FAMILY MEDICINE CLINIC | Age: 75
End: 2025-03-07

## 2025-03-07 ENCOUNTER — HOSPITAL ENCOUNTER (OUTPATIENT)
Dept: GENERAL RADIOLOGY | Age: 75
Discharge: HOME | End: 2025-03-07
Payer: MEDICARE

## 2025-03-07 ENCOUNTER — HOSPITAL ENCOUNTER (OUTPATIENT)
Age: 75
Discharge: HOME | End: 2025-03-07
Payer: MEDICARE

## 2025-03-07 VITALS
TEMPERATURE: 97.9 F | HEART RATE: 92 BPM | OXYGEN SATURATION: 93 % | WEIGHT: 147.6 LBS | HEIGHT: 66 IN | SYSTOLIC BLOOD PRESSURE: 110 MMHG | BODY MASS INDEX: 23.72 KG/M2 | DIASTOLIC BLOOD PRESSURE: 60 MMHG

## 2025-03-07 DIAGNOSIS — R05.8 PRODUCTIVE COUGH: Primary | ICD-10-CM

## 2025-03-07 DIAGNOSIS — R05.8 PRODUCTIVE COUGH: ICD-10-CM

## 2025-03-07 DIAGNOSIS — R06.2 WHEEZING: ICD-10-CM

## 2025-03-07 PROCEDURE — 71046 X-RAY EXAM CHEST 2 VIEWS: CPT

## 2025-03-07 RX ORDER — ALBUTEROL SULFATE 90 UG/1
2 INHALANT RESPIRATORY (INHALATION) EVERY 6 HOURS PRN
Qty: 6.7 G | Refills: 2 | Status: SHIPPED | OUTPATIENT
Start: 2025-03-07

## 2025-03-07 RX ORDER — DOXYCYCLINE HYCLATE 100 MG
100 TABLET ORAL 2 TIMES DAILY
Qty: 14 TABLET | Refills: 0 | Status: SHIPPED | OUTPATIENT
Start: 2025-03-07 | End: 2025-03-14

## 2025-03-07 RX ORDER — AZITHROMYCIN 250 MG/1
TABLET, FILM COATED ORAL
Qty: 6 TABLET | Refills: 0 | Status: SHIPPED | OUTPATIENT
Start: 2025-03-07 | End: 2025-03-17

## 2025-03-07 SDOH — ECONOMIC STABILITY: FOOD INSECURITY: WITHIN THE PAST 12 MONTHS, THE FOOD YOU BOUGHT JUST DIDN'T LAST AND YOU DIDN'T HAVE MONEY TO GET MORE.: NEVER TRUE

## 2025-03-07 SDOH — ECONOMIC STABILITY: FOOD INSECURITY: WITHIN THE PAST 12 MONTHS, YOU WORRIED THAT YOUR FOOD WOULD RUN OUT BEFORE YOU GOT MONEY TO BUY MORE.: SOMETIMES TRUE

## 2025-03-07 ASSESSMENT — ENCOUNTER SYMPTOMS
WHEEZING: 1
DIARRHEA: 1
SHORTNESS OF BREATH: 1
ABDOMINAL PAIN: 0
NAUSEA: 1
COUGH: 1
CHEST TIGHTNESS: 1
VOMITING: 1

## 2025-03-07 ASSESSMENT — PATIENT HEALTH QUESTIONNAIRE - PHQ9
6. FEELING BAD ABOUT YOURSELF - OR THAT YOU ARE A FAILURE OR HAVE LET YOURSELF OR YOUR FAMILY DOWN: NOT AT ALL
SUM OF ALL RESPONSES TO PHQ QUESTIONS 1-9: 0
7. TROUBLE CONCENTRATING ON THINGS, SUCH AS READING THE NEWSPAPER OR WATCHING TELEVISION: NOT AT ALL
10. IF YOU CHECKED OFF ANY PROBLEMS, HOW DIFFICULT HAVE THESE PROBLEMS MADE IT FOR YOU TO DO YOUR WORK, TAKE CARE OF THINGS AT HOME, OR GET ALONG WITH OTHER PEOPLE: NOT DIFFICULT AT ALL
4. FEELING TIRED OR HAVING LITTLE ENERGY: NOT AT ALL
SUM OF ALL RESPONSES TO PHQ QUESTIONS 1-9: 0
9. THOUGHTS THAT YOU WOULD BE BETTER OFF DEAD, OR OF HURTING YOURSELF: NOT AT ALL
3. TROUBLE FALLING OR STAYING ASLEEP: NOT AT ALL
SUM OF ALL RESPONSES TO PHQ QUESTIONS 1-9: 0
5. POOR APPETITE OR OVEREATING: NOT AT ALL
8. MOVING OR SPEAKING SO SLOWLY THAT OTHER PEOPLE COULD HAVE NOTICED. OR THE OPPOSITE, BEING SO FIGETY OR RESTLESS THAT YOU HAVE BEEN MOVING AROUND A LOT MORE THAN USUAL: NOT AT ALL
1. LITTLE INTEREST OR PLEASURE IN DOING THINGS: NOT AT ALL
2. FEELING DOWN, DEPRESSED OR HOPELESS: NOT AT ALL
SUM OF ALL RESPONSES TO PHQ QUESTIONS 1-9: 0

## 2025-03-07 NOTE — PROGRESS NOTES
Talya Rubi (:  1950) is a 75 y.o. female,Established patient, here for evaluation of the following chief complaint(s):  Congestion (SOB, Cough, windy when walking, Burning in chest x 8 days, not taking OTC meds and has not tested for covid or flu)      Assessment & Plan   ASSESSMENT/PLAN:  1. Productive cough  Worsening. Pt has hx of pneumonia and symptoms are similar. Follow pt instructions included.   - CBC; Future  - XR CHEST STANDARD (2 VW); Future  - azithromycin (ZITHROMAX) 250 MG tablet; 500mg on day 1 followed by 250mg on days 2 - 5  Dispense: 6 tablet; Refill: 0  - doxycycline hyclate (VIBRA-TABS) 100 MG tablet; Take 1 tablet by mouth 2 times daily for 7 days  Dispense: 14 tablet; Refill: 0    2. Wheezing  Worsening. Instructed pt to use HHN every 4-6 hrs.        Return if symptoms worsen or fail to improve.         Subjective   SUBJECTIVE/OBJECTIVE:  HPI  Chief Complaint   Patient presents with    Congestion     SOB, Cough, windy when walking, Burning in chest x 8 days, not taking OTC meds and has not tested for covid or flu     For the last week bodyaches, unsure of fevers, productive cough, milky sputum. PO intake decreased, endorses n/v/d. Had 2 episodes of vomiting, bilious or water, constant HA. Using HHN. Has had pneumonia in past and symptoms are similar    Current Outpatient Medications   Medication Sig Dispense Refill    azithromycin (ZITHROMAX) 250 MG tablet 500mg on day 1 followed by 250mg on days 2 - 5 6 tablet 0    doxycycline hyclate (VIBRA-TABS) 100 MG tablet Take 1 tablet by mouth 2 times daily for 7 days 14 tablet 0    vitamin D3 (CHOLECALCIFEROL) 25 MCG (1000 UT) TABS tablet TAKE 1 TABLET BY MOUTH DAILY 90 tablet 0    lovastatin (MEVACOR) 40 MG tablet TAKE 1 TABLET BY MOUTH EVERY NIGHT 90 tablet 0    Suvorexant (BELSOMRA) 20 MG TABS Take 1 tablet by mouth at bedtime for 90 days. Max Daily Amount: 20 mg 90 tablet 0    gabapentin (NEURONTIN) 600 MG tablet Take 1 tablet by mouth

## 2025-03-08 LAB
DEPRECATED RDW RBC AUTO: 14.7 % (ref 12.4–15.4)
HCT VFR BLD AUTO: 40.4 % (ref 36–48)
HGB BLD-MCNC: 13.1 G/DL (ref 12–16)
MCH RBC QN AUTO: 30.5 PG (ref 26–34)
MCHC RBC AUTO-ENTMCNC: 32.5 G/DL (ref 31–36)
MCV RBC AUTO: 93.8 FL (ref 80–100)
PLATELET # BLD AUTO: 280 K/UL (ref 135–450)
PMV BLD AUTO: 8.6 FL (ref 5–10.5)
RBC # BLD AUTO: 4.3 M/UL (ref 4–5.2)
WBC # BLD AUTO: 7.2 K/UL (ref 4–11)

## 2025-03-10 ENCOUNTER — RESULTS FOLLOW-UP (OUTPATIENT)
Dept: GENERAL RADIOLOGY | Age: 75
End: 2025-03-10

## 2025-03-11 ENCOUNTER — TELEPHONE (OUTPATIENT)
Dept: FAMILY MEDICINE CLINIC | Age: 75
End: 2025-03-11

## 2025-03-11 DIAGNOSIS — G89.4 CHRONIC PAIN SYNDROME: ICD-10-CM

## 2025-03-11 DIAGNOSIS — M51.9 LUMBAR DISC DISEASE: ICD-10-CM

## 2025-03-11 NOTE — TELEPHONE ENCOUNTER
Patient due for AWV. Patient can schedule AWV with LPN VV (telephone).   SW daughter Georgina regarding this. She will tell the patient to call the office to schedule.

## 2025-03-12 RX ORDER — TRAMADOL HYDROCHLORIDE 50 MG/1
50 TABLET ORAL EVERY 8 HOURS PRN
Qty: 90 TABLET | Refills: 2 | Status: SHIPPED | OUTPATIENT
Start: 2025-03-12 | End: 2025-06-10

## 2025-03-19 DIAGNOSIS — I10 PRIMARY HYPERTENSION: ICD-10-CM

## 2025-03-19 RX ORDER — LOSARTAN POTASSIUM 25 MG/1
25 TABLET ORAL DAILY
Qty: 90 TABLET | Refills: 0 | Status: SHIPPED | OUTPATIENT
Start: 2025-03-19

## 2025-04-22 ENCOUNTER — OFFICE VISIT (OUTPATIENT)
Dept: FAMILY MEDICINE CLINIC | Age: 75
End: 2025-04-22

## 2025-04-22 VITALS
SYSTOLIC BLOOD PRESSURE: 100 MMHG | TEMPERATURE: 98.1 F | BODY MASS INDEX: 23.63 KG/M2 | WEIGHT: 147 LBS | RESPIRATION RATE: 16 BRPM | DIASTOLIC BLOOD PRESSURE: 66 MMHG | OXYGEN SATURATION: 91 % | HEART RATE: 61 BPM | HEIGHT: 66 IN

## 2025-04-22 DIAGNOSIS — M50.90 CERVICAL DISC DISEASE: ICD-10-CM

## 2025-04-22 DIAGNOSIS — I10 PRIMARY HYPERTENSION: ICD-10-CM

## 2025-04-22 DIAGNOSIS — Z78.0 ENCOUNTER FOR OSTEOPOROSIS SCREENING IN ASYMPTOMATIC POSTMENOPAUSAL PATIENT: ICD-10-CM

## 2025-04-22 DIAGNOSIS — N18.32 STAGE 3B CHRONIC KIDNEY DISEASE (HCC): ICD-10-CM

## 2025-04-22 DIAGNOSIS — G89.4 CHRONIC PAIN SYNDROME: Primary | ICD-10-CM

## 2025-04-22 DIAGNOSIS — I65.29 CAROTID ATHEROSCLEROSIS, UNSPECIFIED LATERALITY: ICD-10-CM

## 2025-04-22 DIAGNOSIS — M51.9 LUMBAR DISC DISEASE: ICD-10-CM

## 2025-04-22 DIAGNOSIS — Z13.820 ENCOUNTER FOR OSTEOPOROSIS SCREENING IN ASYMPTOMATIC POSTMENOPAUSAL PATIENT: ICD-10-CM

## 2025-04-22 DIAGNOSIS — Z51.81 MEDICATION MONITORING ENCOUNTER: ICD-10-CM

## 2025-04-22 DIAGNOSIS — Z83.49 FAMILY HISTORY OF MTHFR DEFICIENCY: ICD-10-CM

## 2025-04-22 DIAGNOSIS — Z12.31 ENCOUNTER FOR SCREENING MAMMOGRAM FOR MALIGNANT NEOPLASM OF BREAST: ICD-10-CM

## 2025-04-22 DIAGNOSIS — E78.00 HYPERCHOLESTEREMIA: ICD-10-CM

## 2025-04-22 DIAGNOSIS — I71.43 INFRARENAL ABDOMINAL AORTIC ANEURYSM (AAA) WITHOUT RUPTURE: ICD-10-CM

## 2025-04-22 DIAGNOSIS — J44.9 CHRONIC OBSTRUCTIVE PULMONARY DISEASE, UNSPECIFIED COPD TYPE (HCC): ICD-10-CM

## 2025-04-22 DIAGNOSIS — R63.4 WEIGHT LOSS: ICD-10-CM

## 2025-04-22 DIAGNOSIS — G89.29 CHRONIC MIDLINE LOW BACK PAIN WITHOUT SCIATICA: ICD-10-CM

## 2025-04-22 DIAGNOSIS — R13.10 DYSPHAGIA, UNSPECIFIED TYPE: ICD-10-CM

## 2025-04-22 DIAGNOSIS — E03.9 ACQUIRED HYPOTHYROIDISM: ICD-10-CM

## 2025-04-22 DIAGNOSIS — M54.50 CHRONIC MIDLINE LOW BACK PAIN WITHOUT SCIATICA: ICD-10-CM

## 2025-04-22 DIAGNOSIS — F11.20 OPIOID DEPENDENCE WITH CURRENT USE (HCC): ICD-10-CM

## 2025-04-22 DIAGNOSIS — R42 DIZZINESS: ICD-10-CM

## 2025-04-22 DIAGNOSIS — M54.2 NECK PAIN: ICD-10-CM

## 2025-04-22 LAB
ALBUMIN SERPL-MCNC: 3.8 G/DL (ref 3.4–5)
ALBUMIN/GLOB SERPL: 1.5 {RATIO} (ref 1.1–2.2)
ALP SERPL-CCNC: 130 U/L (ref 40–129)
ALT SERPL-CCNC: 12 U/L (ref 10–40)
ANION GAP SERPL CALCULATED.3IONS-SCNC: 10 MMOL/L (ref 3–16)
AST SERPL-CCNC: 18 U/L (ref 15–37)
BASOPHILS # BLD: 0.1 K/UL (ref 0–0.2)
BASOPHILS NFR BLD: 1.2 %
BILIRUB SERPL-MCNC: <0.2 MG/DL (ref 0–1)
BUN SERPL-MCNC: 28 MG/DL (ref 7–20)
CALCIUM SERPL-MCNC: 9.2 MG/DL (ref 8.3–10.6)
CHLORIDE SERPL-SCNC: 100 MMOL/L (ref 99–110)
CHOLEST SERPL-MCNC: 168 MG/DL (ref 0–199)
CO2 SERPL-SCNC: 27 MMOL/L (ref 21–32)
CREAT SERPL-MCNC: 2.2 MG/DL (ref 0.6–1.2)
DEPRECATED RDW RBC AUTO: 14.4 % (ref 12.4–15.4)
EOSINOPHIL # BLD: 0.7 K/UL (ref 0–0.6)
EOSINOPHIL NFR BLD: 7.5 %
GFR SERPLBLD CREATININE-BSD FMLA CKD-EPI: 23 ML/MIN/{1.73_M2}
GLUCOSE SERPL-MCNC: 94 MG/DL (ref 70–99)
HCT VFR BLD AUTO: 40.2 % (ref 36–48)
HCYS SERPL-SCNC: 20 UMOL/L (ref 0–10)
HDLC SERPL-MCNC: 56 MG/DL (ref 40–60)
HGB BLD-MCNC: 13.6 G/DL (ref 12–16)
LDLC SERPL CALC-MCNC: 94 MG/DL
LYMPHOCYTES # BLD: 2.8 K/UL (ref 1–5.1)
LYMPHOCYTES NFR BLD: 32.1 %
MCH RBC QN AUTO: 31.4 PG (ref 26–34)
MCHC RBC AUTO-ENTMCNC: 33.9 G/DL (ref 31–36)
MCV RBC AUTO: 92.7 FL (ref 80–100)
MONOCYTES # BLD: 0.6 K/UL (ref 0–1.3)
MONOCYTES NFR BLD: 6.7 %
NEUTROPHILS # BLD: 4.6 K/UL (ref 1.7–7.7)
NEUTROPHILS NFR BLD: 52.5 %
PLATELET # BLD AUTO: 259 K/UL (ref 135–450)
PMV BLD AUTO: 9.5 FL (ref 5–10.5)
POTASSIUM SERPL-SCNC: 5 MMOL/L (ref 3.5–5.1)
PROT SERPL-MCNC: 6.3 G/DL (ref 6.4–8.2)
RBC # BLD AUTO: 4.34 M/UL (ref 4–5.2)
SODIUM SERPL-SCNC: 137 MMOL/L (ref 136–145)
TRIGL SERPL-MCNC: 92 MG/DL (ref 0–150)
TSH SERPL DL<=0.005 MIU/L-ACNC: 3.89 UIU/ML (ref 0.27–4.2)
VLDLC SERPL CALC-MCNC: 18 MG/DL
WBC # BLD AUTO: 8.8 K/UL (ref 4–11)

## 2025-04-22 RX ORDER — ASPIRIN 81 MG/1
81 TABLET ORAL DAILY
COMMUNITY
Start: 2025-04-22

## 2025-04-22 RX ORDER — TRAMADOL HYDROCHLORIDE 50 MG/1
50 TABLET ORAL EVERY 8 HOURS PRN
Qty: 90 TABLET | Refills: 2 | Status: SHIPPED | OUTPATIENT
Start: 2025-04-22 | End: 2025-07-21

## 2025-04-22 RX ORDER — PANTOPRAZOLE SODIUM 20 MG/1
20 TABLET, DELAYED RELEASE ORAL
Qty: 90 TABLET | Refills: 1 | Status: SHIPPED | OUTPATIENT
Start: 2025-04-22

## 2025-04-22 RX ORDER — PREDNISONE 10 MG/1
TABLET ORAL
Qty: 30 TABLET | Refills: 0 | Status: SHIPPED | OUTPATIENT
Start: 2025-04-22

## 2025-04-22 RX ORDER — GABAPENTIN 600 MG/1
600 TABLET ORAL 2 TIMES DAILY
Qty: 180 TABLET | Refills: 0 | Status: SHIPPED | OUTPATIENT
Start: 2025-04-22 | End: 2025-07-21

## 2025-04-22 RX ORDER — CITALOPRAM HYDROBROMIDE 40 MG/1
40 TABLET ORAL DAILY
Qty: 90 TABLET | Refills: 1 | Status: SHIPPED | OUTPATIENT
Start: 2025-04-22

## 2025-04-22 ASSESSMENT — PATIENT HEALTH QUESTIONNAIRE - PHQ9
5. POOR APPETITE OR OVEREATING: NEARLY EVERY DAY
7. TROUBLE CONCENTRATING ON THINGS, SUCH AS READING THE NEWSPAPER OR WATCHING TELEVISION: NEARLY EVERY DAY
3. TROUBLE FALLING OR STAYING ASLEEP: MORE THAN HALF THE DAYS
2. FEELING DOWN, DEPRESSED OR HOPELESS: NOT AT ALL
SUM OF ALL RESPONSES TO PHQ QUESTIONS 1-9: 10
1. LITTLE INTEREST OR PLEASURE IN DOING THINGS: NOT AT ALL
8. MOVING OR SPEAKING SO SLOWLY THAT OTHER PEOPLE COULD HAVE NOTICED. OR THE OPPOSITE, BEING SO FIGETY OR RESTLESS THAT YOU HAVE BEEN MOVING AROUND A LOT MORE THAN USUAL: NOT AT ALL
9. THOUGHTS THAT YOU WOULD BE BETTER OFF DEAD, OR OF HURTING YOURSELF: NOT AT ALL
4. FEELING TIRED OR HAVING LITTLE ENERGY: MORE THAN HALF THE DAYS
SUM OF ALL RESPONSES TO PHQ QUESTIONS 1-9: 10
10. IF YOU CHECKED OFF ANY PROBLEMS, HOW DIFFICULT HAVE THESE PROBLEMS MADE IT FOR YOU TO DO YOUR WORK, TAKE CARE OF THINGS AT HOME, OR GET ALONG WITH OTHER PEOPLE: VERY DIFFICULT
6. FEELING BAD ABOUT YOURSELF - OR THAT YOU ARE A FAILURE OR HAVE LET YOURSELF OR YOUR FAMILY DOWN: NOT AT ALL

## 2025-04-22 NOTE — ASSESSMENT & PLAN NOTE
Increasing pain     Orders:    Katheryn Parry PA, Orthopedic Surgery (Spine), [facility]

## 2025-04-22 NOTE — ASSESSMENT & PLAN NOTE
Increasing pain  check imaging of back and referral to spine     Orders:    traMADol (ULTRAM) 50 MG tablet; Take 1 tablet by mouth every 8 hours as needed for Pain for up to 90 days. Max Daily Amount: 150 mg

## 2025-04-22 NOTE — PROGRESS NOTES
Smoking cessation counseling provided.    7. Dizziness: More pronounced in the evening, associated with balance disturbances and difficulty walking.  - Baby aspirin will be started to help prevent stroke.  - CTA of the head will be performed to rule out any blockages contributing to dizziness.    Follow-up  - Follow up in 2 months.       Return in about 2 years (around 4/22/2027), or if symptoms worsen or fail to improve.       Subjective   HPI     History of Present Illness  The patient is a 75-year-old female who presents for a follow-up visit. She is accompanied by her daughter.    She reports experiencing difficulty in mobility due to persistent back pain, which occasionally radiates from her spine to her hip and extends to her shoulders. Tramadol provides minimal relief and does not enhance her functional capacity. Cyclobenzaprine offers limited relief. Previous injections and therapy have not been significantly helpful. She denies any recent falls but admits to previous incidents due to balance issues. There is no numbness or tingling sensations reported.    Dysphagia is noted, particularly with solid foods, which tend to lodge in the middle of her chest. She also reports frequent episodes of upset stomach, occasional diarrhea, nausea, vomiting, and constipation. There is no hematochezia. She is not currently on any medication for heartburn.    Depression remains stable, with no suicidal ideation.    Compliance with her thyroid medication regimen is reported.    She has been using Belsomra for sleep, which she finds beneficial.    For her respiratory condition, she uses albuterol as needed and Trelegy once daily. No changes in her cough or shortness of breath are reported.    Dizziness is more pronounced in the evening and is associated with balance disturbances and difficulty walking. There are no stroke-like symptoms such as numbness, tingling, or weakness, although occasional numbness and tingling in her

## 2025-04-23 ENCOUNTER — RESULTS FOLLOW-UP (OUTPATIENT)
Dept: FAMILY MEDICINE CLINIC | Age: 75
End: 2025-04-23

## 2025-04-23 DIAGNOSIS — Z83.49 FAMILY HISTORY OF MTHFR DEFICIENCY: ICD-10-CM

## 2025-04-23 DIAGNOSIS — R79.89 HIGH PLASMA HOMOCYSTINE: ICD-10-CM

## 2025-04-23 DIAGNOSIS — N28.9 WORSENING RENAL FUNCTION: Primary | ICD-10-CM

## 2025-04-23 DIAGNOSIS — N18.4 STAGE 4 CHRONIC KIDNEY DISEASE (HCC): ICD-10-CM

## 2025-04-23 LAB
FOLATE SERPL-MCNC: 39.8 NG/ML (ref 4.78–24.2)
VIT B12 SERPL-MCNC: 434 PG/ML (ref 211–911)

## 2025-04-26 ASSESSMENT — ENCOUNTER SYMPTOMS
COUGH: 1
NAUSEA: 0
BACK PAIN: 1
VOMITING: 0
DIARRHEA: 0
WHEEZING: 1
SHORTNESS OF BREATH: 1
TROUBLE SWALLOWING: 1
ABDOMINAL PAIN: 0

## 2025-05-08 ENCOUNTER — OFFICE VISIT (OUTPATIENT)
Dept: ORTHOPEDIC SURGERY | Age: 75
End: 2025-05-08
Payer: MEDICARE

## 2025-05-08 VITALS — WEIGHT: 149 LBS | HEIGHT: 66 IN | BODY MASS INDEX: 23.95 KG/M2

## 2025-05-08 DIAGNOSIS — M47.27 LUMBOSACRAL SPONDYLOSIS WITH RADICULOPATHY: ICD-10-CM

## 2025-05-08 DIAGNOSIS — M54.50 LOW BACK PAIN, UNSPECIFIED BACK PAIN LATERALITY, UNSPECIFIED CHRONICITY, UNSPECIFIED WHETHER SCIATICA PRESENT: Primary | ICD-10-CM

## 2025-05-08 PROCEDURE — 1123F ACP DISCUSS/DSCN MKR DOCD: CPT | Performed by: PHYSICIAN ASSISTANT

## 2025-05-08 PROCEDURE — 99204 OFFICE O/P NEW MOD 45 MIN: CPT | Performed by: PHYSICIAN ASSISTANT

## 2025-05-08 PROCEDURE — 1159F MED LIST DOCD IN RCRD: CPT | Performed by: PHYSICIAN ASSISTANT

## 2025-05-08 PROCEDURE — 1125F AMNT PAIN NOTED PAIN PRSNT: CPT | Performed by: PHYSICIAN ASSISTANT

## 2025-05-08 NOTE — PROGRESS NOTES
degenerative changes, as described above, not  significantly changed.  3. Infrarenal abdominal aortic aneurysm measuring 3 cm, increased in size  from the previous exam.  Please see follow-up recommendations belo      Imaging:  X rays AP and lateral lumbar spine obtained in the office today.  There is x-rays show lumbosacral spondylosis.  No spondylolisthesis.    Diagnosis:      ICD-10-CM    1. Low back pain, unspecified back pain laterality, unspecified chronicity, unspecified whether sciatica present  M54.50 XR LUMBAR SPINE (2-3 VIEWS)      2. Lumbosacral spondylosis with radiculopathy  M47.27 MRI LUMBAR SPINE WO CONTRAST           Assessment/ Plan:    Talya is a 75-year-old female who has lumbar spondylosis with right greater than left leg radiculopathy.  MRI lumbar spine almost 2 years ago shows moderate bilateral neuroforaminal narrowing at L5-S1 with additional multilevel degenerative changes.  Symptoms are progressively worsening.    I had an extensive discussion with Ms. Talya Rubi and/or family regarding the natural history, etiology, and long term consequences of her condition.   I have presented reasonable alternatives to the patient's proposed care, treatment, and services.  Risks and benefits of the treatment options also reviewed in detail. I have outlined a treatment plan with them. She has had full opportunity to ask her questions.  I have answered them all to her satisfaction.  I feel that Ms. Talya Rubi understands our discussion today.     New Medications prescribed today:  Recommend continue tramadol for pain, gabapentin for leg pain.    Further Imaging:  Updated MRI lumbar spine to evaluate for progressively worsening stenosis.  She is now interested in surgical options.      Follow up:   In the office after MRI.     She was instructed to call us emergently if she begins to experience bowel or bladder dysfunction, saddle anesthesia, increasing muscle weakness, or onset/ worsening leg

## 2025-05-22 DIAGNOSIS — F51.04 CHRONIC INSOMNIA: ICD-10-CM

## 2025-05-22 RX ORDER — SUVOREXANT 20 MG/1
20 TABLET, FILM COATED ORAL NIGHTLY
Qty: 90 TABLET | Refills: 0 | Status: SHIPPED | OUTPATIENT
Start: 2025-05-22 | End: 2025-08-20

## 2025-06-02 RX ORDER — LEVOTHYROXINE SODIUM 25 UG/1
25 TABLET ORAL DAILY
Qty: 90 TABLET | Refills: 1 | Status: SHIPPED | OUTPATIENT
Start: 2025-06-02

## 2025-06-05 RX ORDER — CHOLECALCIFEROL (VITAMIN D3) 25 MCG
1000 TABLET ORAL DAILY
Qty: 90 TABLET | Refills: 0 | Status: SHIPPED | OUTPATIENT
Start: 2025-06-05

## 2025-06-11 ENCOUNTER — APPOINTMENT (OUTPATIENT)
Dept: WOMENS IMAGING | Age: 75
End: 2025-06-11
Attending: INTERNAL MEDICINE
Payer: MEDICARE

## 2025-06-11 ENCOUNTER — HOSPITAL ENCOUNTER (OUTPATIENT)
Dept: MRI IMAGING | Age: 75
Discharge: HOME | End: 2025-06-11
Attending: INTERNAL MEDICINE
Payer: MEDICARE

## 2025-06-11 ENCOUNTER — HOSPITAL ENCOUNTER (OUTPATIENT)
Dept: CT IMAGING | Age: 75
Discharge: HOME | End: 2025-06-11
Attending: INTERNAL MEDICINE
Payer: MEDICARE

## 2025-06-11 ENCOUNTER — HOSPITAL ENCOUNTER (OUTPATIENT)
Dept: WOMENS IMAGING | Age: 75
Discharge: HOME | End: 2025-06-11
Attending: INTERNAL MEDICINE
Payer: MEDICARE

## 2025-06-11 VITALS — HEIGHT: 66 IN | WEIGHT: 149 LBS | BODY MASS INDEX: 23.95 KG/M2

## 2025-06-11 DIAGNOSIS — M54.2 NECK PAIN: ICD-10-CM

## 2025-06-11 DIAGNOSIS — N28.9 WORSENING RENAL FUNCTION: ICD-10-CM

## 2025-06-11 DIAGNOSIS — R42 DIZZINESS: ICD-10-CM

## 2025-06-11 DIAGNOSIS — Z12.31 ENCOUNTER FOR SCREENING MAMMOGRAM FOR MALIGNANT NEOPLASM OF BREAST: ICD-10-CM

## 2025-06-11 DIAGNOSIS — I65.29 CAROTID ATHEROSCLEROSIS, UNSPECIFIED LATERALITY: ICD-10-CM

## 2025-06-11 DIAGNOSIS — M50.90 CERVICAL DISC DISEASE: ICD-10-CM

## 2025-06-11 LAB
PERFORMED ON: ABNORMAL
POC CREATININE: 1.6 MG/DL (ref 0.6–1.2)
POC SAMPLE TYPE: ABNORMAL

## 2025-06-11 PROCEDURE — 77067 SCR MAMMO BI INCL CAD: CPT

## 2025-06-11 PROCEDURE — 82565 ASSAY OF CREATININE: CPT

## 2025-06-11 PROCEDURE — 6360000004 HC RX CONTRAST MEDICATION: Performed by: INTERNAL MEDICINE

## 2025-06-11 PROCEDURE — 70496 CT ANGIOGRAPHY HEAD: CPT

## 2025-06-11 PROCEDURE — 72141 MRI NECK SPINE W/O DYE: CPT

## 2025-06-11 PROCEDURE — 70450 CT HEAD/BRAIN W/O DYE: CPT

## 2025-06-11 RX ORDER — IOPAMIDOL 755 MG/ML
75 INJECTION, SOLUTION INTRAVASCULAR
Status: COMPLETED | OUTPATIENT
Start: 2025-06-11 | End: 2025-06-11

## 2025-06-11 RX ADMIN — IOPAMIDOL 75 ML: 755 INJECTION, SOLUTION INTRAVENOUS at 14:52

## 2025-06-11 NOTE — PROGRESS NOTES
Pt presents for outpatient CT: CTA head and neck     IV started and labs drawn to check creat and GFR which were 1.56 and 32. I called patient's PCP to confirm the CT order and question whether or not to proceed with IV contrast. Was told to proceed with test as ordered and to inform patient that labs would be redrawn in 2 weeks post injection.     I informed the patient of her lab results, what our cut off is (GFR 30) and that I had called to confirm with her PCP, Familia, and to proceed with exam.     Patient was instructed to drink extra water/fluids after her scan and to call her PCP with any questions/concerns in the following day(s).

## 2025-06-12 ENCOUNTER — RESULTS FOLLOW-UP (OUTPATIENT)
Dept: FAMILY MEDICINE CLINIC | Age: 75
End: 2025-06-12

## 2025-06-18 ENCOUNTER — RESULTS FOLLOW-UP (OUTPATIENT)
Dept: FAMILY MEDICINE CLINIC | Age: 75
End: 2025-06-18

## 2025-07-10 ENCOUNTER — OFFICE VISIT (OUTPATIENT)
Dept: FAMILY MEDICINE CLINIC | Age: 75
End: 2025-07-10

## 2025-07-10 ENCOUNTER — OFFICE VISIT (OUTPATIENT)
Dept: ORTHOPEDIC SURGERY | Age: 75
End: 2025-07-10
Payer: MEDICARE

## 2025-07-10 VITALS — WEIGHT: 149 LBS | BODY MASS INDEX: 23.95 KG/M2 | HEIGHT: 66 IN

## 2025-07-10 VITALS
TEMPERATURE: 98.1 F | OXYGEN SATURATION: 98 % | SYSTOLIC BLOOD PRESSURE: 128 MMHG | BODY MASS INDEX: 23.46 KG/M2 | HEIGHT: 66 IN | DIASTOLIC BLOOD PRESSURE: 80 MMHG | HEART RATE: 86 BPM | WEIGHT: 146 LBS

## 2025-07-10 DIAGNOSIS — R19.7 DIARRHEA, UNSPECIFIED TYPE: ICD-10-CM

## 2025-07-10 DIAGNOSIS — R11.2 NAUSEA AND VOMITING, UNSPECIFIED VOMITING TYPE: Primary | ICD-10-CM

## 2025-07-10 DIAGNOSIS — M47.27 LUMBOSACRAL SPONDYLOSIS WITH RADICULOPATHY: Primary | ICD-10-CM

## 2025-07-10 DIAGNOSIS — E78.00 HYPERCHOLESTEREMIA: ICD-10-CM

## 2025-07-10 DIAGNOSIS — G89.4 CHRONIC PAIN SYNDROME: ICD-10-CM

## 2025-07-10 DIAGNOSIS — I71.43 INFRARENAL ABDOMINAL AORTIC ANEURYSM (AAA) WITHOUT RUPTURE: ICD-10-CM

## 2025-07-10 DIAGNOSIS — E03.9 ACQUIRED HYPOTHYROIDISM: ICD-10-CM

## 2025-07-10 DIAGNOSIS — N18.32 STAGE 3B CHRONIC KIDNEY DISEASE (HCC): ICD-10-CM

## 2025-07-10 DIAGNOSIS — M51.9 LUMBAR DISC DISEASE: ICD-10-CM

## 2025-07-10 DIAGNOSIS — I10 ESSENTIAL HYPERTENSION: ICD-10-CM

## 2025-07-10 DIAGNOSIS — F51.04 CHRONIC INSOMNIA: ICD-10-CM

## 2025-07-10 PROCEDURE — 1159F MED LIST DOCD IN RCRD: CPT | Performed by: PHYSICIAN ASSISTANT

## 2025-07-10 PROCEDURE — 99213 OFFICE O/P EST LOW 20 MIN: CPT | Performed by: PHYSICIAN ASSISTANT

## 2025-07-10 PROCEDURE — 1123F ACP DISCUSS/DSCN MKR DOCD: CPT | Performed by: PHYSICIAN ASSISTANT

## 2025-07-10 PROCEDURE — 1125F AMNT PAIN NOTED PAIN PRSNT: CPT | Performed by: PHYSICIAN ASSISTANT

## 2025-07-10 RX ORDER — PROMETHAZINE HYDROCHLORIDE 12.5 MG/1
12.5 TABLET ORAL EVERY 8 HOURS PRN
Qty: 90 TABLET | Refills: 0 | Status: SHIPPED | OUTPATIENT
Start: 2025-07-10

## 2025-07-10 RX ORDER — PANTOPRAZOLE SODIUM 20 MG/1
20 TABLET, DELAYED RELEASE ORAL
Qty: 90 TABLET | Refills: 1 | Status: SHIPPED | OUTPATIENT
Start: 2025-07-10

## 2025-07-10 RX ORDER — TRAMADOL HYDROCHLORIDE 50 MG/1
50 TABLET ORAL EVERY 8 HOURS PRN
Qty: 90 TABLET | Refills: 2 | Status: SHIPPED | OUTPATIENT
Start: 2025-07-10 | End: 2025-10-08

## 2025-07-10 NOTE — PROGRESS NOTES
mouth daily 90 tablet 1    gabapentin (NEURONTIN) 600 MG tablet Take 1 tablet by mouth 2 times daily for 90 days. 180 tablet 0    traMADol (ULTRAM) 50 MG tablet Take 1 tablet by mouth every 8 hours as needed for Pain for up to 90 days. Max Daily Amount: 150 mg 90 tablet 2    albuterol sulfate HFA (PROVENTIL;VENTOLIN;PROAIR) 108 (90 Base) MCG/ACT inhaler Inhale 2 puffs into the lungs every 6 hours as needed for Wheezing 6.7 g 2    lovastatin (MEVACOR) 40 MG tablet TAKE 1 TABLET BY MOUTH EVERY NIGHT 90 tablet 0    Doxylamine Succinate, Sleep, (SLEEP AID PO) prn      cyclobenzaprine (FLEXERIL) 10 MG tablet 1/2 to 1 tablet tid prn 90 tablet 5    methylfolate (DEPLIN) 7.5 MG TABS tablet Take 1 tablet by mouth daily 90 tablet 1    dicyclomine (BENTYL) 20 MG tablet Take 1 tablet by mouth 4 times daily as needed (abdominal pain and cramping) 30 tablet 0    albuterol (PROVENTIL) (2.5 MG/3ML) 0.083% nebulizer solution Take 3 mLs by nebulization every 6 hours as needed for Wheezing 120 each 0    promethazine (PHENERGAN) 12.5 MG tablet Take 1 tablet by mouth every 8 hours as needed for Nausea TAKE 1 TABLET BY MOUTH EVERY DAY AS NEEDED 90 tablet 0    fluticasone-umeclidin-vilant (TRELEGY ELLIPTA) 200-62.5-25 MCG/ACT AEPB inhaler Inhale 1 puff into the lungs daily 1 each 5    Misc. Devices MISC Nebulizer set up and tubing x 2  J44.9 2 each 1    acetaminophen (TYLENOL) 500 MG tablet Take 2 tablets by mouth 2 times daily      dicyclomine (BENTYL) 10 MG capsule Take 1 capsule by mouth 4 times daily as needed (abdominal cramping and diarrhea) 120 capsule 1     No current facility-administered medications for this visit.         Objective:     Ht 1.676 m (5' 6\")   Wt 67.6 kg (149 lb)   LMP  (LMP Unknown)   BMI 24.05 kg/m²         General exam:  She is well-developed and well-nourished, is in obvious discomfort and alert and oriented to person, place, and time.   She demonstrates appropriate mood and affect. Her skin is warm and

## 2025-07-10 NOTE — PROGRESS NOTES
Talya Rubi (:  1950) is a 75 y.o. female,Established patient, here for evaluation of the following chief complaint(s):  Hypertension (2 Month follow up)         Assessment & Plan  Chronic pain syndrome       Orders:    traMADol (ULTRAM) 50 MG tablet; Take 1 tablet by mouth every 8 hours as needed for Pain for up to 90 days. Max Daily Amount: 150 mg    DRUG SCREEN MULTI URINE; Future    Lumbar disc disease       Orders:    traMADol (ULTRAM) 50 MG tablet; Take 1 tablet by mouth every 8 hours as needed for Pain for up to 90 days. Max Daily Amount: 150 mg    DRUG SCREEN MULTI URINE; Future    Nausea and vomiting, unspecified vomiting type   Persisting and needs egd     Orders:    Kole Ruiz MD, Gastroenterology (ERCP & EUS), [facility]    Diarrhea, unspecified type       Orders:    Kole Ruiz MD, Gastroenterology (ERCP & EUS), [facility]    promethazine (PHENERGAN) 12.5 MG tablet; Take 1 tablet by mouth every 8 hours as needed for Nausea TAKE 1 TABLET BY MOUTH EVERY DAY AS NEEDED    Hypercholesteremia       Orders:    Comprehensive Metabolic Panel; Future    Lipid Panel; Future    Stage 3b chronic kidney disease (HCC)       Orders:    Comprehensive Metabolic Panel; Future    CBC with Auto Differential; Future    Essential hypertension   Controlled on current meds     Orders:    Comprehensive Metabolic Panel; Future    Chronic insomnia   On belsomra and reviewed oarrs          Infrarenal abdominal aortic aneurysm (AAA) without rupture   To repeat in 5 years          Acquired hypothyroidism   On thyroid replacement     Orders:    TSH; Future         Assessment & Plan  1. Chronic back pain: Persistent.  - Previous MRI showing moderate bilateral foraminal narrowing at L5-S1.  - Conservative measures such as injections discussed as a prerequisite before considering surgical options.  - Tramadol refill provided for 3 months.  - Gabapentin discontinued due to

## 2025-07-10 NOTE — ASSESSMENT & PLAN NOTE
Orders:    traMADol (ULTRAM) 50 MG tablet; Take 1 tablet by mouth every 8 hours as needed for Pain for up to 90 days. Max Daily Amount: 150 mg    DRUG SCREEN MULTI URINE; Future

## 2025-08-04 ENCOUNTER — TELEPHONE (OUTPATIENT)
Dept: PHARMACY | Facility: CLINIC | Age: 75
End: 2025-08-04

## 2025-08-14 DIAGNOSIS — F51.04 CHRONIC INSOMNIA: ICD-10-CM

## 2025-08-14 RX ORDER — SUVOREXANT 20 MG/1
20 TABLET, FILM COATED ORAL NIGHTLY
Qty: 90 TABLET | Refills: 0 | OUTPATIENT
Start: 2025-08-14 | End: 2025-11-12

## 2025-08-20 RX ORDER — SUVOREXANT 20 MG/1
20 TABLET, FILM COATED ORAL NIGHTLY
Qty: 90 TABLET | Refills: 0 | Status: SHIPPED | OUTPATIENT
Start: 2025-08-24 | End: 2025-11-22

## 2025-08-27 RX ORDER — LOVASTATIN 40 MG/1
40 TABLET ORAL NIGHTLY
Qty: 90 TABLET | Refills: 0 | Status: SHIPPED | OUTPATIENT
Start: 2025-08-27

## 2025-09-04 ENCOUNTER — HOSPITAL ENCOUNTER (OUTPATIENT)
Dept: MRI IMAGING | Age: 75
Discharge: HOME | End: 2025-09-04
Payer: MEDICARE

## 2025-09-04 DIAGNOSIS — M47.27 LUMBOSACRAL SPONDYLOSIS WITH RADICULOPATHY: ICD-10-CM

## 2025-09-04 PROCEDURE — 72148 MRI LUMBAR SPINE W/O DYE: CPT

## 2025-09-04 RX ORDER — LOVASTATIN 40 MG/1
40 TABLET ORAL NIGHTLY
Qty: 90 TABLET | Refills: 0 | OUTPATIENT
Start: 2025-09-04

## (undated) DEVICE — CLEANER,CAUTERY TIP,2X2",STERILE: Brand: MEDLINE

## (undated) DEVICE — MARKER SKIN MINI PUR FINE REGULAR TIP W RUL XL PREP RESIST

## (undated) DEVICE — SEALER ENDOSCP NANO COAT OPN DIV CRV L JAW LIGASURE IMPACT

## (undated) DEVICE — COVER LT HNDL BLU PLAS

## (undated) DEVICE — MERCY HEALTH WEST TURNOVER: Brand: MEDLINE INDUSTRIES, INC.

## (undated) DEVICE — ELECTRODE PT RET AD L9FT HI MOIST COND ADH HYDRGEL CORDED

## (undated) DEVICE — RELOAD STPL L75MM OPN H3.8MM CLS 1.5MM WIRE DIA0.2MM REG

## (undated) DEVICE — SUTURE PROL SZ 1 L30IN NONABSORBABLE BLU L36MM CT-1 1/2 CIR 8425H

## (undated) DEVICE — SPONGE GZ W4XL4IN COT 12 PLY TYP VII WVN C FLD DSGN

## (undated) DEVICE — SUTURE VCRL SZ 3-0 L18IN ABSRB UD W/O NDL POLYGLACTIN 910 J110T

## (undated) DEVICE — ELECTRODE BLDE L6.5IN CAUT EXT DISP

## (undated) DEVICE — EPIDURAL TRAY: Brand: MEDLINE INDUSTRIES, INC.

## (undated) DEVICE — Z DISCONTINUED USE 2716239 STAPLER INT STPL 51MM CUT LN L40MM STD TISS CRV CUT CR40B

## (undated) DEVICE — STAPLER INT L37CM STPL 25MM CIR ENDOSCP CRV INTLUMN B FRM

## (undated) DEVICE — SOLUTION IV IRRIG POUR BRL 0.9% SODIUM CHL 2F7124

## (undated) DEVICE — APPLICATOR MEDICATED 26 CC SOLUTION HI LT ORNG CHLORAPREP

## (undated) DEVICE — SPONGE LAP W18XL18IN WHT COT 4 PLY FLD STRUNG RADPQ DISP ST

## (undated) DEVICE — STAPLER SKIN H3.9MM WIRE DIA0.58MM CRWN 6.9MM 35 STPL FIX

## (undated) DEVICE — GLOVE ORANGE PI 7   MSG9070

## (undated) DEVICE — SHEET, T, LAPAROTOMY, STERILE: Brand: MEDLINE

## (undated) DEVICE — NERVE BLOCK TRAY: Brand: MEDLINE INDUSTRIES, INC.

## (undated) DEVICE — APPLIER LIG CLP M L11IN TI STR RNG HNDL FOR 20 CLP DISP

## (undated) DEVICE — GARMENT COMPR STD FOR 17IN CALF UNIF THER FLOTRN

## (undated) DEVICE — MAJOR SET UP PK

## (undated) DEVICE — NEEDLE QUINCKE 22GX5": Brand: MEDLINE

## (undated) DEVICE — AVANOS* TUOHY EPIDURAL NEEDLE: Brand: AVANOS

## (undated) DEVICE — TOTAL TRAY, 16FR 10ML SIL FOLEY, URN: Brand: MEDLINE

## (undated) DEVICE — STAPLER INT L75MM CUT LN L73MM STPL LN L77MM BLU B FRM 8